# Patient Record
Sex: FEMALE | Race: WHITE | NOT HISPANIC OR LATINO
[De-identification: names, ages, dates, MRNs, and addresses within clinical notes are randomized per-mention and may not be internally consistent; named-entity substitution may affect disease eponyms.]

---

## 2017-10-12 PROBLEM — Z00.00 ENCOUNTER FOR PREVENTIVE HEALTH EXAMINATION: Noted: 2017-10-12

## 2017-10-13 ENCOUNTER — APPOINTMENT (OUTPATIENT)
Dept: OBGYN | Facility: CLINIC | Age: 62
End: 2017-10-13
Payer: COMMERCIAL

## 2017-10-13 VITALS
BODY MASS INDEX: 38.99 KG/M2 | DIASTOLIC BLOOD PRESSURE: 92 MMHG | SYSTOLIC BLOOD PRESSURE: 128 MMHG | WEIGHT: 234 LBS | HEIGHT: 65 IN

## 2017-10-13 DIAGNOSIS — R35.0 FREQUENCY OF MICTURITION: ICD-10-CM

## 2017-10-13 DIAGNOSIS — Z87.59 PERSONAL HISTORY OF OTHER COMPLICATIONS OF PREGNANCY, CHILDBIRTH AND THE PUERPERIUM: ICD-10-CM

## 2017-10-13 DIAGNOSIS — Z78.9 OTHER SPECIFIED HEALTH STATUS: ICD-10-CM

## 2017-10-13 DIAGNOSIS — Z01.419 ENCOUNTER FOR GYNECOLOGICAL EXAMINATION (GENERAL) (ROUTINE) W/OUT ABNORMAL FINDINGS: ICD-10-CM

## 2017-10-13 DIAGNOSIS — D25.9 LEIOMYOMA OF UTERUS, UNSPECIFIED: ICD-10-CM

## 2017-10-13 DIAGNOSIS — F17.200 NICOTINE DEPENDENCE, UNSPECIFIED, UNCOMPLICATED: ICD-10-CM

## 2017-10-13 PROCEDURE — 99386 PREV VISIT NEW AGE 40-64: CPT

## 2017-10-13 PROCEDURE — 99213 OFFICE O/P EST LOW 20 MIN: CPT | Mod: 25

## 2017-10-16 ENCOUNTER — ASOB RESULT (OUTPATIENT)
Age: 62
End: 2017-10-16

## 2017-10-16 ENCOUNTER — APPOINTMENT (OUTPATIENT)
Dept: OBGYN | Facility: CLINIC | Age: 62
End: 2017-10-16
Payer: COMMERCIAL

## 2017-10-16 ENCOUNTER — RX RENEWAL (OUTPATIENT)
Age: 62
End: 2017-10-16

## 2017-10-16 LAB
BACTERIA UR CULT: ABNORMAL
HPV HIGH+LOW RISK DNA PNL CVX: NOT DETECTED

## 2017-10-16 PROCEDURE — 76830 TRANSVAGINAL US NON-OB: CPT

## 2017-10-19 LAB — BACTERIA UR CULT: NORMAL

## 2017-10-20 LAB — CYTOLOGY CVX/VAG DOC THIN PREP: NORMAL

## 2017-10-25 ENCOUNTER — OTHER (OUTPATIENT)
Age: 62
End: 2017-10-25

## 2017-10-31 ENCOUNTER — OTHER (OUTPATIENT)
Age: 62
End: 2017-10-31

## 2017-12-05 ENCOUNTER — OTHER (OUTPATIENT)
Age: 62
End: 2017-12-05

## 2017-12-06 ENCOUNTER — LABORATORY RESULT (OUTPATIENT)
Age: 62
End: 2017-12-06

## 2017-12-06 ENCOUNTER — APPOINTMENT (OUTPATIENT)
Dept: OBGYN | Facility: CLINIC | Age: 62
End: 2017-12-06
Payer: COMMERCIAL

## 2017-12-06 DIAGNOSIS — R93.8 ABNORMAL FINDINGS ON DIAGNOSTIC IMAGING OF OTHER SPECIFIED BODY STRUCTURES: ICD-10-CM

## 2017-12-06 PROCEDURE — 58558Z: CUSTOM

## 2017-12-14 ENCOUNTER — OTHER (OUTPATIENT)
Age: 62
End: 2017-12-14

## 2018-01-25 ENCOUNTER — OUTPATIENT (OUTPATIENT)
Dept: OUTPATIENT SERVICES | Facility: HOSPITAL | Age: 63
LOS: 1 days | End: 2018-01-25
Payer: COMMERCIAL

## 2018-01-25 VITALS
TEMPERATURE: 98 F | DIASTOLIC BLOOD PRESSURE: 80 MMHG | WEIGHT: 235.89 LBS | SYSTOLIC BLOOD PRESSURE: 126 MMHG | HEART RATE: 85 BPM | HEIGHT: 63 IN | RESPIRATION RATE: 16 BRPM

## 2018-01-25 DIAGNOSIS — Z98.890 OTHER SPECIFIED POSTPROCEDURAL STATES: Chronic | ICD-10-CM

## 2018-01-25 DIAGNOSIS — N84.0 POLYP OF CORPUS UTERI: ICD-10-CM

## 2018-01-25 LAB
BLD GP AB SCN SERPL QL: NEGATIVE — SIGNIFICANT CHANGE UP
BUN SERPL-MCNC: 11 MG/DL — SIGNIFICANT CHANGE UP (ref 7–23)
CALCIUM SERPL-MCNC: 8.9 MG/DL — SIGNIFICANT CHANGE UP (ref 8.4–10.5)
CHLORIDE SERPL-SCNC: 99 MMOL/L — SIGNIFICANT CHANGE UP (ref 98–107)
CO2 SERPL-SCNC: 29 MMOL/L — SIGNIFICANT CHANGE UP (ref 22–31)
CREAT SERPL-MCNC: 0.63 MG/DL — SIGNIFICANT CHANGE UP (ref 0.5–1.3)
GLUCOSE SERPL-MCNC: 126 MG/DL — HIGH (ref 70–99)
HBA1C BLD-MCNC: 6.3 % — HIGH (ref 4–5.6)
HCT VFR BLD CALC: 43.2 % — SIGNIFICANT CHANGE UP (ref 34.5–45)
HGB BLD-MCNC: 13.8 G/DL — SIGNIFICANT CHANGE UP (ref 11.5–15.5)
MCHC RBC-ENTMCNC: 29.3 PG — SIGNIFICANT CHANGE UP (ref 27–34)
MCHC RBC-ENTMCNC: 31.9 % — LOW (ref 32–36)
MCV RBC AUTO: 91.7 FL — SIGNIFICANT CHANGE UP (ref 80–100)
NRBC # FLD: 0 — SIGNIFICANT CHANGE UP
PLATELET # BLD AUTO: 331 K/UL — SIGNIFICANT CHANGE UP (ref 150–400)
PMV BLD: 9.6 FL — SIGNIFICANT CHANGE UP (ref 7–13)
POTASSIUM SERPL-MCNC: 4.4 MMOL/L — SIGNIFICANT CHANGE UP (ref 3.5–5.3)
POTASSIUM SERPL-SCNC: 4.4 MMOL/L — SIGNIFICANT CHANGE UP (ref 3.5–5.3)
RBC # BLD: 4.71 M/UL — SIGNIFICANT CHANGE UP (ref 3.8–5.2)
RBC # FLD: 14 % — SIGNIFICANT CHANGE UP (ref 10.3–14.5)
RH IG SCN BLD-IMP: POSITIVE — SIGNIFICANT CHANGE UP
SODIUM SERPL-SCNC: 141 MMOL/L — SIGNIFICANT CHANGE UP (ref 135–145)
WBC # BLD: 7.59 K/UL — SIGNIFICANT CHANGE UP (ref 3.8–10.5)
WBC # FLD AUTO: 7.59 K/UL — SIGNIFICANT CHANGE UP (ref 3.8–10.5)

## 2018-01-25 PROCEDURE — 93010 ELECTROCARDIOGRAM REPORT: CPT

## 2018-01-25 RX ORDER — SODIUM CHLORIDE 9 MG/ML
1000 INJECTION, SOLUTION INTRAVENOUS
Qty: 0 | Refills: 0 | Status: DISCONTINUED | OUTPATIENT
Start: 2018-01-30 | End: 2018-01-31

## 2018-01-25 NOTE — H&P PST ADULT - PROBLEM SELECTOR PLAN 1
Pt is scheduled for dilation and curettage polypectomy with everett on 1/30/18.   Pre op instructions given and pt able to verbalize understanding.

## 2018-01-25 NOTE — H&P PST ADULT - NEGATIVE MUSCULOSKELETAL SYMPTOMS
no stiffness/no leg pain R/no muscle cramps/no muscle weakness/no neck pain/no arthralgia/no joint swelling/no myalgia/no arm pain L/no arm pain R/no back pain/no leg pain L

## 2018-01-25 NOTE — H&P PST ADULT - FAMILY HISTORY
Father  Still living? No  Family history of diabetes mellitus, Age at diagnosis: Age Unknown     Grandparent  Still living? No  Family history of diabetes mellitus, Age at diagnosis: Age Unknown     Mother  Still living? No  Family history of liver cancer, Age at diagnosis: Age Unknown

## 2018-01-25 NOTE — H&P PST ADULT - NEGATIVE NEUROLOGICAL SYMPTOMS
no loss of sensation/no difficulty walking/no transient paralysis/no weakness/no generalized seizures/no headache

## 2018-01-25 NOTE — H&P PST ADULT - PMH
Arthritis    High cholesterol    Obesity    Renal cyst    Uterine polyp Arthritis    High cholesterol    Obesity    Prediabetes    Renal cyst    Uterine polyp

## 2018-01-25 NOTE — H&P PST ADULT - NEGATIVE CARDIOVASCULAR SYMPTOMS
no dyspnea on exertion/no orthopnea/no chest pain/no palpitations/no claudication/no paroxysmal nocturnal dyspnea/no peripheral edema

## 2018-01-25 NOTE — H&P PST ADULT - MUSCULOSKELETAL
details… detailed exam no joint warmth/no calf tenderness/no joint erythema/ROM intact/no joint swelling/normal strength

## 2018-01-25 NOTE — H&P PST ADULT - NSANTHOSAYNRD_GEN_A_CORE
No. FANG screening performed.  STOP BANG Legend: 0-2 = LOW Risk; 3-4 = INTERMEDIATE Risk; 5-8 = HIGH Risk

## 2018-01-25 NOTE — H&P PST ADULT - HISTORY OF PRESENT ILLNESS
63 yo female with PMH hyperlipidemia presents to pre surgical testing.  Pt reports she started to experience urinary frequency 6 months ago, wet to PCP.  Pt reports CT done Aug 2017, revealed uterine polyp.  Pt is s/p D&C Dec 6, 2017.    Pt is scheduled for dilation and curettage polypectomy with symphion on 1/30/18. 61 yo female with PMH hyperlipidemia presents to pre surgical testing.  Pt reports she started to experience urinary frequency 6 months ago, went to PCP.  Pt reports CT done Aug 2017, revealed uterine polyp.  Pt is s/p D&C Dec 6, 2017.    Pt is scheduled for dilation and curettage polypectomy with symphion on 1/30/18.

## 2018-01-25 NOTE — H&P PST ADULT - PSH
Dental disorder  teeth removal 2010  History of D&C  12/6/17  S/P cholecystectomy  1988  S/P foot surgery  bunionectomy right foot 2013  left foot 2013

## 2018-01-30 ENCOUNTER — APPOINTMENT (OUTPATIENT)
Dept: OBGYN | Facility: HOSPITAL | Age: 63
End: 2018-01-30

## 2018-01-30 ENCOUNTER — RESULT REVIEW (OUTPATIENT)
Age: 63
End: 2018-01-30

## 2018-01-30 ENCOUNTER — OUTPATIENT (OUTPATIENT)
Dept: OUTPATIENT SERVICES | Facility: HOSPITAL | Age: 63
LOS: 1 days | Discharge: HOME | End: 2018-01-30
Payer: COMMERCIAL

## 2018-01-30 ENCOUNTER — TRANSCRIPTION ENCOUNTER (OUTPATIENT)
Age: 63
End: 2018-01-30

## 2018-01-30 VITALS
DIASTOLIC BLOOD PRESSURE: 64 MMHG | SYSTOLIC BLOOD PRESSURE: 110 MMHG | OXYGEN SATURATION: 96 % | HEART RATE: 85 BPM | RESPIRATION RATE: 16 BRPM | TEMPERATURE: 98 F

## 2018-01-30 VITALS
HEART RATE: 82 BPM | SYSTOLIC BLOOD PRESSURE: 141 MMHG | WEIGHT: 235.89 LBS | RESPIRATION RATE: 14 BRPM | TEMPERATURE: 98 F | HEIGHT: 63 IN | DIASTOLIC BLOOD PRESSURE: 81 MMHG | OXYGEN SATURATION: 96 %

## 2018-01-30 DIAGNOSIS — N84.0 POLYP OF CORPUS UTERI: ICD-10-CM

## 2018-01-30 DIAGNOSIS — Z98.890 OTHER SPECIFIED POSTPROCEDURAL STATES: Chronic | ICD-10-CM

## 2018-01-30 PROCEDURE — 88305 TISSUE EXAM BY PATHOLOGIST: CPT | Mod: 26

## 2018-01-30 PROCEDURE — 58562 HYSTEROSCOPY REMOVE FB: CPT

## 2018-01-30 RX ORDER — ATORVASTATIN CALCIUM 80 MG/1
1 TABLET, FILM COATED ORAL
Qty: 0 | Refills: 0 | COMMUNITY

## 2018-01-30 RX ORDER — CHOLECALCIFEROL (VITAMIN D3) 125 MCG
1 CAPSULE ORAL
Qty: 0 | Refills: 0 | COMMUNITY

## 2018-01-30 RX ORDER — SODIUM CHLORIDE 9 MG/ML
1000 INJECTION, SOLUTION INTRAVENOUS
Qty: 0 | Refills: 0 | Status: DISCONTINUED | OUTPATIENT
Start: 2018-01-30 | End: 2018-01-31

## 2018-01-30 RX ADMIN — SODIUM CHLORIDE 30 MILLILITER(S): 9 INJECTION, SOLUTION INTRAVENOUS at 11:05

## 2018-01-30 RX ADMIN — SODIUM CHLORIDE 125 MILLILITER(S): 9 INJECTION, SOLUTION INTRAVENOUS at 13:39

## 2018-01-30 NOTE — ASU DISCHARGE PLAN (ADULT/PEDIATRIC). - SPECIAL INSTRUCTIONS
You were given IV Tylenol for pain management.  Please DO NOT take tylenol for the next 6-8 hours (until ___5:45pm____ ). Please do not exceed 3000mg in 24hours.   You were given Toradol for pain management. Please DO Not take Motrin/Ibuprofen (NSAIDS) for the next 6 hours (Until ___6:15pm____).

## 2018-01-30 NOTE — ASU DISCHARGE PLAN (ADULT/PEDIATRIC). - NOTIFY
GYN Fever>100.4/Pain not relieved by Medications/Bleeding that does not stop/Unable to Urinate/Persistent Nausea and Vomiting

## 2018-01-30 NOTE — BRIEF OPERATIVE NOTE - PROCEDURE
<<-----Click on this checkbox to enter Procedure Hysteroscopic polypectomy of uterus with dilation and curettage  01/30/2018    Active  [place]

## 2018-02-13 ENCOUNTER — OTHER (OUTPATIENT)
Age: 63
End: 2018-02-13

## 2018-03-05 ENCOUNTER — APPOINTMENT (OUTPATIENT)
Dept: OBGYN | Facility: CLINIC | Age: 63
End: 2018-03-05
Payer: COMMERCIAL

## 2018-03-05 VITALS — DIASTOLIC BLOOD PRESSURE: 88 MMHG | WEIGHT: 240 LBS | BODY MASS INDEX: 39.94 KG/M2 | SYSTOLIC BLOOD PRESSURE: 141 MMHG

## 2018-03-05 PROCEDURE — 99213 OFFICE O/P EST LOW 20 MIN: CPT

## 2019-08-27 PROBLEM — N84.0 POLYP OF CORPUS UTERI: Chronic | Status: ACTIVE | Noted: 2018-01-25

## 2019-08-27 PROBLEM — N28.1 CYST OF KIDNEY, ACQUIRED: Chronic | Status: ACTIVE | Noted: 2018-01-25

## 2019-08-27 PROBLEM — E66.9 OBESITY, UNSPECIFIED: Chronic | Status: ACTIVE | Noted: 2018-01-25

## 2019-08-28 ENCOUNTER — APPOINTMENT (OUTPATIENT)
Dept: GASTROENTEROLOGY | Facility: CLINIC | Age: 64
End: 2019-08-28
Payer: COMMERCIAL

## 2019-08-28 VITALS
DIASTOLIC BLOOD PRESSURE: 82 MMHG | SYSTOLIC BLOOD PRESSURE: 128 MMHG | WEIGHT: 254 LBS | BODY MASS INDEX: 43.36 KG/M2 | HEIGHT: 64 IN

## 2019-08-28 DIAGNOSIS — Z80.0 FAMILY HISTORY OF MALIGNANT NEOPLASM OF DIGESTIVE ORGANS: ICD-10-CM

## 2019-08-28 DIAGNOSIS — Z12.12 ENCOUNTER FOR SCREENING FOR MALIGNANT NEOPLASM OF COLON: ICD-10-CM

## 2019-08-28 DIAGNOSIS — Z86.39 PERSONAL HISTORY OF OTHER ENDOCRINE, NUTRITIONAL AND METABOLIC DISEASE: ICD-10-CM

## 2019-08-28 DIAGNOSIS — Z12.11 ENCOUNTER FOR SCREENING FOR MALIGNANT NEOPLASM OF COLON: ICD-10-CM

## 2019-08-28 DIAGNOSIS — R19.5 OTHER FECAL ABNORMALITIES: ICD-10-CM

## 2019-08-28 DIAGNOSIS — Z87.09 PERSONAL HISTORY OF OTHER DISEASES OF THE RESPIRATORY SYSTEM: ICD-10-CM

## 2019-08-28 PROCEDURE — 99214 OFFICE O/P EST MOD 30 MIN: CPT

## 2019-08-28 NOTE — PHYSICAL EXAM
[General Appearance - Alert] : alert [General Appearance - In No Acute Distress] : in no acute distress [Sclera] : the sclera and conjunctiva were normal [Extraocular Movements] : extraocular movements were intact [Outer Ear] : the ears and nose were normal in appearance [Hearing Threshold Finger Rub Not [place]] : hearing was normal [Neck Appearance] : the appearance of the neck was normal [Neck Cervical Mass (___cm)] : no neck mass was observed [Auscultation Breath Sounds / Voice Sounds] : lungs were clear to auscultation bilaterally [Heart Rate And Rhythm] : heart rate was normal and rhythm regular [Heart Sounds] : normal S1 and S2 [Heart Sounds Gallop] : no gallops [Murmurs] : no murmurs [Heart Sounds Pericardial Friction Rub] : no pericardial rub [Abdomen Soft] : soft [Bowel Sounds] : normal bowel sounds [Abdomen Tenderness] : non-tender [Abdomen Mass (___ Cm)] : no abdominal mass palpated [Nail Clubbing] : no clubbing  or cyanosis of the fingernails [Abnormal Walk] : normal gait [Skin Color & Pigmentation] : normal skin color and pigmentation [] : no rash [Affect] : the affect was normal [Impaired Insight] : insight and judgment were intact [Oriented To Time, Place, And Person] : oriented to person, place, and time

## 2019-08-29 PROBLEM — Z80.0 FAMILY HISTORY OF LIVER CANCER: Status: ACTIVE | Noted: 2017-10-13

## 2019-08-29 NOTE — ASSESSMENT
[FreeTextEntry1] : 64 y/o woman with Hx of DM, hyperlipidemia, COPD, active smoker who is referred for positive FIT.  \par \par I will therefore plan for a colonoscopy to rule out colon polyps, colorectal cancer etc. under monitored anesthesia care.  Discussed alternatives such as CT colonography she would like to hold off.   She would like a colonoscopy. Risks such as perforation requiring surgery, bleeding, infection, colitis, missed lesion, internal organ injury, etc, risks of bowel prep including colitis, syncope etc. and risks of anesthesia including cardiopulmonary compromise were discussed with patient.  Patient verbalized understanding and agrees to proceed with the planned procedure.  She will need cardiology clearance first.  \par \par Discussed EGD to r/o upper GI tract pathology - she would like to hold off. \par   \par Above all discussed with patient daughter Merlyn who is a nurse. \par \par \par

## 2019-08-29 NOTE — HISTORY OF PRESENT ILLNESS
[de-identified] : 62 y/o woman with Hx of DM, hyperlipidemia, COPD, active smoker who is referred for positive FIT.  \par \par \par Recent blood test in 8/2019 showed H/H of 14.6/45.8.  MCV 91.1. \par \par Has occasional slight discomfort in rectum.   2 to 3 times a day goes to the bathroom for years.  She has to push at times to have a bowel movement.    Lost 11 pounds recently intensionally. \par \par Pt denies having abdominal pain, heartburn, dysphagia, odynophagia, nausea, vomiting, fevers, chills, jaundice, loss of appetite, diarrhea, hematochezia and melena.\par \par Mom had liver cancer.  Patient denies family Hx of other GI cancers.\par \par She gets SOB on exertion.   All other review of systems are negative.

## 2019-10-07 ENCOUNTER — OUTPATIENT (OUTPATIENT)
Dept: OUTPATIENT SERVICES | Facility: HOSPITAL | Age: 64
LOS: 1 days | End: 2019-10-07
Payer: COMMERCIAL

## 2019-10-07 VITALS
SYSTOLIC BLOOD PRESSURE: 120 MMHG | RESPIRATION RATE: 14 BRPM | HEART RATE: 92 BPM | HEIGHT: 65 IN | TEMPERATURE: 99 F | WEIGHT: 244.93 LBS | DIASTOLIC BLOOD PRESSURE: 69 MMHG | OXYGEN SATURATION: 95 %

## 2019-10-07 DIAGNOSIS — R94.39 ABNORMAL RESULT OF OTHER CARDIOVASCULAR FUNCTION STUDY: ICD-10-CM

## 2019-10-07 DIAGNOSIS — Z98.890 OTHER SPECIFIED POSTPROCEDURAL STATES: Chronic | ICD-10-CM

## 2019-10-07 LAB
ALBUMIN SERPL ELPH-MCNC: 4.1 G/DL — SIGNIFICANT CHANGE UP (ref 3.3–5)
ALP SERPL-CCNC: 61 U/L — SIGNIFICANT CHANGE UP (ref 40–120)
ALT FLD-CCNC: 18 U/L — SIGNIFICANT CHANGE UP (ref 10–45)
ANION GAP SERPL CALC-SCNC: 11 MMOL/L — SIGNIFICANT CHANGE UP (ref 5–17)
AST SERPL-CCNC: 19 U/L — SIGNIFICANT CHANGE UP (ref 10–40)
BILIRUB SERPL-MCNC: 0.3 MG/DL — SIGNIFICANT CHANGE UP (ref 0.2–1.2)
BUN SERPL-MCNC: 12 MG/DL — SIGNIFICANT CHANGE UP (ref 7–23)
CALCIUM SERPL-MCNC: 9.1 MG/DL — SIGNIFICANT CHANGE UP (ref 8.4–10.5)
CHLORIDE SERPL-SCNC: 102 MMOL/L — SIGNIFICANT CHANGE UP (ref 96–108)
CO2 SERPL-SCNC: 26 MMOL/L — SIGNIFICANT CHANGE UP (ref 22–31)
CREAT SERPL-MCNC: 0.48 MG/DL — LOW (ref 0.5–1.3)
GLUCOSE BLDC GLUCOMTR-MCNC: 129 MG/DL — HIGH (ref 70–99)
GLUCOSE SERPL-MCNC: 141 MG/DL — HIGH (ref 70–99)
HCT VFR BLD CALC: 42.1 % — SIGNIFICANT CHANGE UP (ref 34.5–45)
HGB BLD-MCNC: 14 G/DL — SIGNIFICANT CHANGE UP (ref 11.5–15.5)
MCHC RBC-ENTMCNC: 28.9 PG — SIGNIFICANT CHANGE UP (ref 27–34)
MCHC RBC-ENTMCNC: 33.3 GM/DL — SIGNIFICANT CHANGE UP (ref 32–36)
MCV RBC AUTO: 87 FL — SIGNIFICANT CHANGE UP (ref 80–100)
NRBC # BLD: 0 /100 WBCS — SIGNIFICANT CHANGE UP (ref 0–0)
PLATELET # BLD AUTO: 312 K/UL — SIGNIFICANT CHANGE UP (ref 150–400)
POTASSIUM SERPL-MCNC: 4.5 MMOL/L — SIGNIFICANT CHANGE UP (ref 3.5–5.3)
POTASSIUM SERPL-SCNC: 4.5 MMOL/L — SIGNIFICANT CHANGE UP (ref 3.5–5.3)
PROT SERPL-MCNC: 6.7 G/DL — SIGNIFICANT CHANGE UP (ref 6–8.3)
RBC # BLD: 4.84 M/UL — SIGNIFICANT CHANGE UP (ref 3.8–5.2)
RBC # FLD: 14.9 % — HIGH (ref 10.3–14.5)
SODIUM SERPL-SCNC: 139 MMOL/L — SIGNIFICANT CHANGE UP (ref 135–145)
WBC # BLD: 6.43 K/UL — SIGNIFICANT CHANGE UP (ref 3.8–10.5)
WBC # FLD AUTO: 6.43 K/UL — SIGNIFICANT CHANGE UP (ref 3.8–10.5)

## 2019-10-07 PROCEDURE — 85027 COMPLETE CBC AUTOMATED: CPT

## 2019-10-07 PROCEDURE — 93460 R&L HRT ART/VENTRICLE ANGIO: CPT | Mod: 26

## 2019-10-07 PROCEDURE — C1887: CPT

## 2019-10-07 PROCEDURE — C1894: CPT

## 2019-10-07 PROCEDURE — 93010 ELECTROCARDIOGRAM REPORT: CPT

## 2019-10-07 PROCEDURE — 82962 GLUCOSE BLOOD TEST: CPT

## 2019-10-07 PROCEDURE — 80053 COMPREHEN METABOLIC PANEL: CPT

## 2019-10-07 PROCEDURE — 93005 ELECTROCARDIOGRAM TRACING: CPT

## 2019-10-07 PROCEDURE — C1889: CPT

## 2019-10-07 PROCEDURE — C1769: CPT

## 2019-10-07 PROCEDURE — 93460 R&L HRT ART/VENTRICLE ANGIO: CPT

## 2019-10-07 RX ORDER — SODIUM CHLORIDE 9 MG/ML
3 INJECTION INTRAMUSCULAR; INTRAVENOUS; SUBCUTANEOUS EVERY 8 HOURS
Refills: 0 | Status: DISCONTINUED | OUTPATIENT
Start: 2019-10-07 | End: 2019-10-22

## 2019-10-07 NOTE — H&P CARDIOLOGY - HISTORY OF PRESENT ILLNESS
64 yo  female (denies any implantable devices) with PMH DMT2 (a1c 9 Sept 2019) and HLD presents today for cardiac angiogram. Patient reports exertional dyspnea x 3 months. Denies lightheadedness palpitations, syncope, PND or orthopnea.  Patient seen and evaluated by Dr. Morales- TTE done revealing dilated CM, moderate AR, EF 40-45%. Also holter monitor given to patient  shows- asymptomatic PAC's, occasional PVC's 62 yo  female (denies any implantable devices) with PMH DMT2 (a1c 9 Sept 2019) and HLD presents today for cardiac angiogram. Patient reports exertional dyspnea x 3 months. Denies lightheadedness palpitations, syncope, PND or orthopnea.  Patient seen and evaluated by Dr. Morales- TTE done revealing dilated CM, moderate AR and aortic aneurysm (5.6cm) with EF 40-45%. Also holter monitor given to patient  shows- asymptomatic PAC's, occasional PVC's 64 yo  female (denies any implantable devices) with PMH DMT2 (a1c 9 Sept 2019) and HLD presents today for cardiac angiogram. Patient reports clearance for colonoscopy. Denies lightheadedness palpitations, syncope, PND or orthopnea.  Patient seen and evaluated by Dr. Morales- TTE done revealing dilated CM, moderate AR and aortic aneurysm (5.6cm) with EF 40-45%. Also holter monitor given to patient  shows- asymptomatic PAC's, occasional PVC's

## 2019-10-07 NOTE — H&P CARDIOLOGY - PMH
Arthritis    Former smoker    High cholesterol    Obesity    Prediabetes    Renal cyst    Uterine polyp

## 2019-10-07 NOTE — H&P CARDIOLOGY - RESPIRATORY
Airway patent, TM normal bilaterally, normal appearing mouth, nose, throat, neck supple with full range of motion, no cervical adenopathy. Breath Sounds equal & clear to percussion & auscultation, no accessory muscle use

## 2019-10-28 ENCOUNTER — APPOINTMENT (OUTPATIENT)
Dept: CARDIOTHORACIC SURGERY | Facility: CLINIC | Age: 64
End: 2019-10-28
Payer: COMMERCIAL

## 2019-10-28 VITALS
TEMPERATURE: 97.8 F | HEIGHT: 64 IN | DIASTOLIC BLOOD PRESSURE: 78 MMHG | OXYGEN SATURATION: 98 % | RESPIRATION RATE: 14 BRPM | BODY MASS INDEX: 39.27 KG/M2 | WEIGHT: 230 LBS | SYSTOLIC BLOOD PRESSURE: 118 MMHG | HEART RATE: 86 BPM

## 2019-10-28 DIAGNOSIS — E78.5 HYPERLIPIDEMIA, UNSPECIFIED: ICD-10-CM

## 2019-10-28 DIAGNOSIS — I71.9 AORTIC ANEURYSM OF UNSPECIFIED SITE, W/OUT RUPTURE: ICD-10-CM

## 2019-10-28 DIAGNOSIS — Z83.3 FAMILY HISTORY OF DIABETES MELLITUS: ICD-10-CM

## 2019-10-28 DIAGNOSIS — E11.9 TYPE 2 DIABETES MELLITUS W/OUT COMPLICATIONS: ICD-10-CM

## 2019-10-28 PROCEDURE — 99204 OFFICE O/P NEW MOD 45 MIN: CPT

## 2019-11-07 ENCOUNTER — OUTPATIENT (OUTPATIENT)
Dept: OUTPATIENT SERVICES | Facility: HOSPITAL | Age: 64
LOS: 1 days | End: 2019-11-07
Payer: COMMERCIAL

## 2019-11-07 ENCOUNTER — APPOINTMENT (OUTPATIENT)
Dept: ULTRASOUND IMAGING | Facility: HOSPITAL | Age: 64
End: 2019-11-07

## 2019-11-07 ENCOUNTER — APPOINTMENT (OUTPATIENT)
Dept: CARDIOTHORACIC SURGERY | Facility: CLINIC | Age: 64
End: 2019-11-07

## 2019-11-07 VITALS
DIASTOLIC BLOOD PRESSURE: 77 MMHG | TEMPERATURE: 98 F | RESPIRATION RATE: 16 BRPM | HEART RATE: 88 BPM | HEIGHT: 63 IN | OXYGEN SATURATION: 97 % | SYSTOLIC BLOOD PRESSURE: 115 MMHG | WEIGHT: 227.96 LBS

## 2019-11-07 DIAGNOSIS — I71.9 AORTIC ANEURYSM OF UNSPECIFIED SITE, WITHOUT RUPTURE: ICD-10-CM

## 2019-11-07 DIAGNOSIS — Z29.9 ENCOUNTER FOR PROPHYLACTIC MEASURES, UNSPECIFIED: ICD-10-CM

## 2019-11-07 DIAGNOSIS — Z98.890 OTHER SPECIFIED POSTPROCEDURAL STATES: Chronic | ICD-10-CM

## 2019-11-07 DIAGNOSIS — Z01.818 ENCOUNTER FOR OTHER PREPROCEDURAL EXAMINATION: ICD-10-CM

## 2019-11-07 DIAGNOSIS — E11.9 TYPE 2 DIABETES MELLITUS WITHOUT COMPLICATIONS: ICD-10-CM

## 2019-11-07 DIAGNOSIS — I35.0 NONRHEUMATIC AORTIC (VALVE) STENOSIS: ICD-10-CM

## 2019-11-07 DIAGNOSIS — I10 ESSENTIAL (PRIMARY) HYPERTENSION: ICD-10-CM

## 2019-11-07 LAB
ANION GAP SERPL CALC-SCNC: 11 MMOL/L — SIGNIFICANT CHANGE UP (ref 5–17)
BLD GP AB SCN SERPL QL: NEGATIVE — SIGNIFICANT CHANGE UP
BUN SERPL-MCNC: 11 MG/DL — SIGNIFICANT CHANGE UP (ref 7–23)
CALCIUM SERPL-MCNC: 9.5 MG/DL — SIGNIFICANT CHANGE UP (ref 8.4–10.5)
CHLORIDE SERPL-SCNC: 100 MMOL/L — SIGNIFICANT CHANGE UP (ref 96–108)
CO2 SERPL-SCNC: 25 MMOL/L — SIGNIFICANT CHANGE UP (ref 22–31)
CREAT SERPL-MCNC: 0.45 MG/DL — LOW (ref 0.5–1.3)
GLUCOSE SERPL-MCNC: 116 MG/DL — HIGH (ref 70–99)
HBA1C BLD-MCNC: 6.5 % — HIGH (ref 4–5.6)
HCT VFR BLD CALC: 41.8 % — SIGNIFICANT CHANGE UP (ref 34.5–45)
HGB BLD-MCNC: 13.4 G/DL — SIGNIFICANT CHANGE UP (ref 11.5–15.5)
MCHC RBC-ENTMCNC: 28.8 PG — SIGNIFICANT CHANGE UP (ref 27–34)
MCHC RBC-ENTMCNC: 32.1 GM/DL — SIGNIFICANT CHANGE UP (ref 32–36)
MCV RBC AUTO: 89.9 FL — SIGNIFICANT CHANGE UP (ref 80–100)
MRSA PCR RESULT.: SIGNIFICANT CHANGE UP
PLATELET # BLD AUTO: 317 K/UL — SIGNIFICANT CHANGE UP (ref 150–400)
POTASSIUM SERPL-MCNC: 4.3 MMOL/L — SIGNIFICANT CHANGE UP (ref 3.5–5.3)
POTASSIUM SERPL-SCNC: 4.3 MMOL/L — SIGNIFICANT CHANGE UP (ref 3.5–5.3)
RBC # BLD: 4.65 M/UL — SIGNIFICANT CHANGE UP (ref 3.8–5.2)
RBC # FLD: 16 % — HIGH (ref 10.3–14.5)
RH IG SCN BLD-IMP: POSITIVE — SIGNIFICANT CHANGE UP
S AUREUS DNA NOSE QL NAA+PROBE: SIGNIFICANT CHANGE UP
SODIUM SERPL-SCNC: 136 MMOL/L — SIGNIFICANT CHANGE UP (ref 135–145)
WBC # BLD: 6.47 K/UL — SIGNIFICANT CHANGE UP (ref 3.8–10.5)
WBC # FLD AUTO: 6.47 K/UL — SIGNIFICANT CHANGE UP (ref 3.8–10.5)

## 2019-11-07 PROCEDURE — 86900 BLOOD TYPING SEROLOGIC ABO: CPT

## 2019-11-07 PROCEDURE — 93880 EXTRACRANIAL BILAT STUDY: CPT | Mod: 26

## 2019-11-07 PROCEDURE — 86901 BLOOD TYPING SEROLOGIC RH(D): CPT

## 2019-11-07 PROCEDURE — 71046 X-RAY EXAM CHEST 2 VIEWS: CPT

## 2019-11-07 PROCEDURE — 87640 STAPH A DNA AMP PROBE: CPT

## 2019-11-07 PROCEDURE — 93880 EXTRACRANIAL BILAT STUDY: CPT

## 2019-11-07 PROCEDURE — 87641 MR-STAPH DNA AMP PROBE: CPT

## 2019-11-07 PROCEDURE — 85027 COMPLETE CBC AUTOMATED: CPT

## 2019-11-07 PROCEDURE — 83036 HEMOGLOBIN GLYCOSYLATED A1C: CPT

## 2019-11-07 PROCEDURE — 80048 BASIC METABOLIC PNL TOTAL CA: CPT

## 2019-11-07 PROCEDURE — 86850 RBC ANTIBODY SCREEN: CPT

## 2019-11-07 PROCEDURE — G0463: CPT

## 2019-11-07 PROCEDURE — 71046 X-RAY EXAM CHEST 2 VIEWS: CPT | Mod: 26

## 2019-11-07 RX ORDER — ATORVASTATIN CALCIUM 80 MG/1
1 TABLET, FILM COATED ORAL
Qty: 0 | Refills: 0 | DISCHARGE

## 2019-11-07 RX ORDER — MONTELUKAST 4 MG/1
1 TABLET, CHEWABLE ORAL
Qty: 0 | Refills: 0 | DISCHARGE

## 2019-11-07 RX ORDER — CEFUROXIME AXETIL 250 MG
1500 TABLET ORAL ONCE
Refills: 0 | Status: COMPLETED | OUTPATIENT
Start: 2019-11-12 | End: 2019-11-12

## 2019-11-07 NOTE — H&P PST ADULT - SOURCE OF INFORMATION, PROFILE
patient family/patient/pt.'s , Jem,  present during PST visit. Pt.'s primary language is English, understands English well, declined telephonic

## 2019-11-07 NOTE — H&P PST ADULT - ASSESSMENT
CAPRINI SCORE [CLOT]    AGE RELATED RISK FACTORS                                                       MOBILITY RELATED FACTORS  [ ] Age 41-60 years                                            (1 Point)                  [ ] Bed rest                                                        (1 Point)  [x ] Age: 61-74 years                                           (2 Points)                 [ ] Plaster cast                                                   (2 Points)  [ ] Age= 75 years                                              (3 Points)                 [ ] Bed bound for more than 72 hours                 (2 Points)    DISEASE RELATED RISK FACTORS                                               GENDER SPECIFIC FACTORS  [ ] Edema in the lower extremities                       (1 Point)                  [ ] Pregnancy                                                     (1 Point)  [x ] Varicose veins                                               (1 Point)                  [ ] Post-partum < 6 weeks                                   (1 Point)             [x ] BMI > 25 Kg/m2                                            (1 Point)                  [ ] Hormonal therapy  or oral contraception          (1 Point)                 [ ] Sepsis (in the previous month)                        (1 Point)                  [ ] History of pregnancy complications                 (1 point)  [ ] Pneumonia or serious lung disease                                               [ ] Unexplained or recurrent                     (1 Point)           (in the previous month)                               (1 Point)  [ ] Abnormal pulmonary function test                     (1 Point)                 SURGERY RELATED RISK FACTORS  [ ] Acute myocardial infarction                              (1 Point)                 [ ]  Section                                             (1 Point)  [ ] Congestive heart failure (in the previous month)  (1 Point)               [ ] Minor surgery                                                  (1 Point)   [ ] Inflammatory bowel disease                             (1 Point)                 [ ] Arthroscopic surgery                                        (2 Points)  [ ] Central venous access                                      (2 Points)                x] General surgery lasting more than 45 minutes   (2 Points)       [ ] Stroke (in the previous month)                          (5 Points)               [ ] Elective arthroplasty                                         (5 Points)                                                                                                                                               HEMATOLOGY RELATED FACTORS                                                 TRAUMA RELATED RISK FACTORS  [ ] Prior episodes of VTE                                     (3 Points)                [ ] Fracture of the hip, pelvis, or leg                       (5 Points)  [ ] Positive family history for VTE                         (3 Points)                 [ ] Acute spinal cord injury (in the previous month)  (5 Points)  [ ] Prothrombin 29339 A                                     (3 Points)                 [ ] Paralysis  (less than 1 month)                             (5 Points)  [ ] Factor V Leiden                                             (3 Points)                  [ ] Multiple Trauma within 1 month                        (5 Points)  [ ] Lupus anticoagulants                                     (3 Points)                                                           [ ] Anticardiolipin antibodies                               (3 Points)                                                       [ ] High homocysteine in the blood                      (3 Points)                                             [ ] Other congenital or acquired thrombophilia      (3 Points)                                                [ ] Heparin induced thrombocytopenia                  (3 Points)                                          Total Score [       6   ]    Caprini Score 0 - 2:  Low Risk, No VTE Prophylaxis required for most patients, encourage ambulation  Caprini Score 3 - 6:  At Risk, pharmacologic VTE prophylaxis is indicated for most patients (in the absence of a contraindication)  Caprini Score Greater than or = 7:  High Risk, pharmacologic VTE prophylaxis is indicated for most patients (in the absence of a contraindication)

## 2019-11-07 NOTE — H&P PST ADULT - ACTIVITY
ADLs, house chores, climbs 1 flight of stairs daily Can walk 3 blocks, two flights of stairs without stopping

## 2019-11-07 NOTE — H&P PST ADULT - NSICDXPASTMEDICALHX_GEN_ALL_CORE_FT
PAST MEDICAL HISTORY:  Arthritis     Former smoker     High cholesterol     Obesity     Prediabetes     Renal cyst     Uterine polyp PAST MEDICAL HISTORY:  Arthritis     COPD, mild Singulair at night, no nebulizers    DM2 (diabetes mellitus, type 2)     Former smoker Quit 10/2019    H/O aortic aneurysm Dx 10/2019    High cholesterol     Hypertension     Lung nodule Dx 2016, left 3mm    Obesity     Renal cyst     Uterine polyp

## 2019-11-07 NOTE — H&P PST ADULT - LYMPHATIC
supraclavicular L/anterior cervical R/posterior cervical L/anterior cervical L/posterior cervical R/supraclavicular R anterior cervical R/anterior cervical L

## 2019-11-07 NOTE — H&P PST ADULT - HISTORY OF PRESENT ILLNESS
62 yo female, PMH HTN, HLD, COPD, DM2, + Cologuard test, going through cardiac clearance for colonoscopy, Echocardiogram revealed an aortic root 5.8 cm ascending aorta 5.8 cm. Pt. referred to CT surgery and presents to PST for scheduled Aortic Root Replacement with Ascending Aorta Replacement and Aortic Valve Repair on 11/12/19. Pt. rts occasional swelling of her lower extremities, she has been feeling well since actively trying to lose weight and has lost 30 lbs since August Denies recent fever, chills, chest pain, SOB, changes in bowel/urinary habits.    3mm scar left lung 3 years ago, one year later 64 yo female, PMH HTN, HLD, COPD, DM2, + Cologuard test, going through cardiac clearance for colonoscopy, Echocardiogram revealed an aortic root 5.6 cm ascending aorta 5.8 cm. Pt. referred to CT surgery and presents to PST for scheduled Aortic Root Replacement with Ascending Aorta Replacement/Aortic Valve Repair on 11/12/19. Pt. reports feeling well since actively trying to lose weight and has lost 30 lbs since August, occasional PIEDRA and swelling of LE. Denies back pain, chest pain, palpitations recent fever, chills, changes in bowel/urinary habits.

## 2019-11-07 NOTE — H&P PST ADULT - NSICDXPROBLEM_GEN_ALL_CORE_FT
PROBLEM DIAGNOSES  Problem: AA (aortic aneurysm)  Assessment and Plan: Aortic Root Replacement w Ascending Aorta Replacement/Aortic Valve Repair  Pre-op instructions, including Chlorhexidine soap, provided - all questions answered    Problem: Hypertension  Assessment and Plan: Continue Metoprolol as indicated, including am of surgery with sip of water    Problem: DM2 (diabetes mellitus, type 2)  Assessment and Plan: FS DOS    Problem: Prophylactic measure  Assessment and Plan: The Caprini score indicates that this patient is at high risk for a VTE event (score 6 or greater). Surgical patients in this group will benefit from both pharmacologic prophylaxis and intermittent compression devices.  The surgical team will determine the balance between VTE risk and bleeding risk, and other clinical considerations

## 2019-11-07 NOTE — H&P PST ADULT - NEGATIVE GENERAL GENITOURINARY SYMPTOMS
no dysuria/no bladder infections/no hematuria/no flank pain L/no flank pain R no hematuria/no incontinence/no bladder infections/no dysuria/no flank pain R/no flank pain L/normal urinary frequency

## 2019-11-07 NOTE — H&P PST ADULT - PRIMARY CARE PROVIDER
Dr. Laguna([facility]) [phone] Dr. Laguna ([facility]) [phone]

## 2019-11-07 NOTE — H&P PST ADULT - NEGATIVE MUSCULOSKELETAL SYMPTOMS
no myalgia/no stiffness/no neck pain/no arm pain R/no leg pain R/no arm pain L/no joint swelling/no muscle weakness/no back pain/no leg pain L/no muscle cramps no joint swelling/no back pain/no stiffness/no neck pain

## 2019-11-07 NOTE — H&P PST ADULT - GASTROINTESTINAL DETAILS
no distention/soft/bowel sounds normal/nontender no masses palpable/bowel sounds normal/soft/no distention/no bruit/nontender

## 2019-11-07 NOTE — H&P PST ADULT - NSICDXPASTSURGICALHX_GEN_ALL_CORE_FT
PAST SURGICAL HISTORY:  Dental disorder teeth removal 2010    History of D&C 12/6/17 and 2018 for thickened endometrium    S/P cholecystectomy 1988    S/P foot surgery bunionectomy right foot 2013  left foot 2013

## 2019-11-07 NOTE — H&P PST ADULT - NEGATIVE NEUROLOGICAL SYMPTOMS
no difficulty walking/no headache/no loss of sensation/no transient paralysis/no generalized seizures/no weakness no weakness/no syncope/no headache/no loss of sensation/no difficulty walking/no generalized seizures

## 2019-11-07 NOTE — H&P PST ADULT - MUSCULOSKELETAL
details… detailed exam no joint warmth/no joint erythema/ROM intact/normal strength/no joint swelling/no calf tenderness

## 2019-11-07 NOTE — H&P PST ADULT - NEGATIVE ENMT SYMPTOMS
no vertigo/no nasal congestion/no ear pain/no tinnitus/no dysphagia/no sinus symptoms/no hearing difficulty no hearing difficulty/no nasal congestion/no dysphagia/no ear pain/no tinnitus/no throat pain/no sinus symptoms/no vertigo

## 2019-11-07 NOTE — H&P PST ADULT - NSICDXFAMILYHX_GEN_ALL_CORE_FT
FAMILY HISTORY:  Father  Still living? No  Family history of diabetes mellitus, Age at diagnosis: Age Unknown    Mother  Still living? No  Family history of liver cancer, Age at diagnosis: Age Unknown    Grandparent  Still living? No  Family history of diabetes mellitus, Age at diagnosis: Age Unknown

## 2019-11-11 ENCOUNTER — TRANSCRIPTION ENCOUNTER (OUTPATIENT)
Age: 64
End: 2019-11-11

## 2019-11-11 NOTE — HISTORY OF PRESENT ILLNESS
[FreeTextEntry1] : Virgie is a 63 year old female with history of HLD, COPD, active smoker and recently diagnosed with DMII, presents today with her  and daughter, for an evaluation of an aortic aneurysm, referred by cardiologist Dr. Roel Cabello.  She was in the process of obtaining cardiology clearance for a colonoscopy after a positive fit test in August when she say Dr. Cabello for an echo. She reports she feels good otherwise. She reports occasional swelling to her lower legs when on her feet for a while and some occasional lower back pain just at the belt line that has been present for months. Worsened by movement and standing from a sitting position and subsides with rest.  She also reports recent weight loss of 30 lbs since August but has been actively trying to loose weight through dieting since then. She denies fever, chills, syncope, chest pain, orthopnea, SOB, palpitations, dizziness, abdominal pain, back pain or vomiting.  \par \par Echocardiogram 9/26/19: aortic root 5.6cm, EF40-45%.\par \par CTA chest 10/17/19: aortic root 4.2cm, ascending aorta 5.8cm, descending aorta tortuous and dilated. \par \par Cardiac cath 10/7/19 revealed: normal coronaries. \par

## 2019-11-11 NOTE — END OF VISIT
[FreeTextEntry3] : Written by Dima Salter NP, acting as a scribe for Dr. Morin.\par “The documentation recorded by the scribe accurately reflects the service I personally performed and the decisions made by me.” Signature Solomon Morin MD.\par \par

## 2019-11-11 NOTE — ASSESSMENT
[FreeTextEntry1] : Virgie is a 63 year old female with history of HLD, COPD, active smoker and recently diagnosed with DMII, presents today with her  and daughter, for an evaluation of an aortic aneurysm, referred by cardiologist Dr. Roel Cabello.  She was in the process of obtaining cardiology clearance for a colonoscopy after a positive fit test in August when she say Dr. Cabello for an echo. She reports she feels good otherwise. She reports occasional swelling to her lower legs when on her feet for a while and some occasional lower back pain just at the belt line that has been present for months. Worsened by movement and standing from a sitting position and subsides with rest.  She also reports recent weight loss of 30 lbs since August but has been actively trying to loose weight through dieting since then. She denies fever, chills, syncope, chest pain, orthopnea, SOB, palpitations, dizziness, abdominal pain, back pain or vomiting.  \par \par Echocardiogram 9/26/19: aortic root 5.6cm, EF40-45%.\par \par CTA chest 10/17/19: aortic root 4.2cm, ascending aorta 5.8cm, descending aorta tortuous and dilated. \par \par Cardiac cath 10/7/19 revealed: normal coronaries. \par \par The patient's medical records and diagnostic images were reviewed at the time of this office visit, and the following recommendation was made. \par \par I reviewed the cardiac imaging, medical records and reports with patient and discussed the case.  I discussed the risks , benefits and alternatives to surgery. Risks included but not limited to  bleeding, stroke, Myocardial Infarction, kidney problems,Blood transfusion ,permanent  pacemaker implantation, infections and death. I  quoted a 1-2% operative mortality and complication risks. I also discussed the various approaches in detail. I  feel that the patient will benefit and is a candidate for a aortic root replacement with ascending aorta/AV repair/replacement.\par All questions and concerns were addressed and patient agrees to proceed with surgery. \par \par Plan:\par 1) Surgery date to be determined\par 2) Carotid Duplex pre op\par 3) PFT’s pre op\par 4) PST pre op\par \par

## 2019-11-11 NOTE — CONSULT LETTER
[FreeTextEntry2] : Dr. Roel Morales\par [facility] \par [address] [facility] [address]\[place] [address] [FreeTextEntry3] : \par Solomon Morin M.D.\par Professor of Cardiovascular and Thoracic Surgery\par Minimally Invasive Valve Surgeon\par Director of Aortic Surgery, [facility]\par Cell: [phone]\par Email: [email or web address] \par \par [facility]:\par [address], [address] Floor, [place] [address]\par Office: [phone]\par Fax: [phone]\par \par [facility]:\par Department of Cardiovascular and Thoracic Surgery\par [address] [place] [address], [address]\par Office: [phone]\par Fax: [phone]\par \par Practice Manager: Ms. Zenia Marie\par Email: [email or web address]\par Phone: [phone]\par \par   [FreeTextEntry1] : \par I had the pleasure of seeing your patient, OSWALD KULKARNI in my office today. \par \par We take a multidisciplinary team approach to patient care and consider you, the physician, an extension of our team. We will maintain an open line of communication with you throughout your patient's treatment course. \par \par She is being evaluated for aortic aneurysm. I have reviewed all of the patient's medical records and diagnostic images at the time of her office consultation. I have enclosed a copy for your records. \par \par I have reviewed the indications for surgery,and used our webpage [email or web address] <[email or web address]> to illustrate the aorta and anatomy of the heart. The patient meets criteria for surgery. I have recommended that the patient is a candidate for aortic root replacement with ascending aorta/AV repair/replacement.\par \par I will update you on her perioperative status and disposition upon discharge. \par \par I appreciate the opportunity to care for your patient at the [facility] for [facility] based at [facility]. If there are any questions or concerns, please call me directly at [phone]. \par

## 2019-11-11 NOTE — PHYSICAL EXAM
[Examination Of The Chest] : the chest was normal in appearance [Right Carotid Bruit] : no bruit heard over the right carotid [Left Carotid Bruit] : no bruit heard over the left carotid [FreeTextEntry1] : Deferred

## 2019-11-12 ENCOUNTER — INPATIENT (INPATIENT)
Facility: HOSPITAL | Age: 64
LOS: 5 days | Discharge: HOME | DRG: 220 | End: 2019-11-18
Attending: THORACIC SURGERY (CARDIOTHORACIC VASCULAR SURGERY) | Admitting: THORACIC SURGERY (CARDIOTHORACIC VASCULAR SURGERY)
Payer: COMMERCIAL

## 2019-11-12 ENCOUNTER — RESULT REVIEW (OUTPATIENT)
Age: 64
End: 2019-11-12

## 2019-11-12 VITALS
RESPIRATION RATE: 16 BRPM | OXYGEN SATURATION: 97 % | TEMPERATURE: 98 F | DIASTOLIC BLOOD PRESSURE: 72 MMHG | HEART RATE: 89 BPM | SYSTOLIC BLOOD PRESSURE: 112 MMHG | WEIGHT: 227.96 LBS | HEIGHT: 63 IN

## 2019-11-12 DIAGNOSIS — Z98.890 OTHER SPECIFIED POSTPROCEDURAL STATES: Chronic | ICD-10-CM

## 2019-11-12 DIAGNOSIS — I71.9 AORTIC ANEURYSM OF UNSPECIFIED SITE, WITHOUT RUPTURE: ICD-10-CM

## 2019-11-12 DIAGNOSIS — I35.0 NONRHEUMATIC AORTIC (VALVE) STENOSIS: ICD-10-CM

## 2019-11-12 LAB
ALBUMIN SERPL ELPH-MCNC: 3.8 G/DL — SIGNIFICANT CHANGE UP (ref 3.3–5)
ALP SERPL-CCNC: 45 U/L — SIGNIFICANT CHANGE UP (ref 40–120)
ALT FLD-CCNC: 35 U/L — SIGNIFICANT CHANGE UP (ref 10–45)
ANION GAP SERPL CALC-SCNC: 13 MMOL/L — SIGNIFICANT CHANGE UP (ref 5–17)
APTT BLD: 30.8 SEC — SIGNIFICANT CHANGE UP (ref 27.5–36.3)
AST SERPL-CCNC: 55 U/L — HIGH (ref 10–40)
BASE EXCESS BLDV CALC-SCNC: 1 MMOL/L — SIGNIFICANT CHANGE UP (ref -2–2)
BASE EXCESS BLDV CALC-SCNC: 1.4 MMOL/L — SIGNIFICANT CHANGE UP (ref -2–2)
BASOPHILS # BLD AUTO: 0.01 K/UL — SIGNIFICANT CHANGE UP (ref 0–0.2)
BASOPHILS NFR BLD AUTO: 0.1 % — SIGNIFICANT CHANGE UP (ref 0–2)
BILIRUB SERPL-MCNC: 1 MG/DL — SIGNIFICANT CHANGE UP (ref 0.2–1.2)
BLOOD GAS VENOUS - CREATININE: SIGNIFICANT CHANGE UP MG/DL (ref 0.5–1.3)
BLOOD GAS VENOUS - CREATININE: SIGNIFICANT CHANGE UP MG/DL (ref 0.5–1.3)
BUN SERPL-MCNC: 9 MG/DL — SIGNIFICANT CHANGE UP (ref 7–23)
CA-I SERPL-SCNC: 0.94 MMOL/L — LOW (ref 1.12–1.3)
CA-I SERPL-SCNC: 0.94 MMOL/L — LOW (ref 1.12–1.3)
CALCIUM SERPL-MCNC: 7.9 MG/DL — LOW (ref 8.4–10.5)
CHLORIDE BLDV-SCNC: SIGNIFICANT CHANGE UP MMOL/L (ref 96–108)
CHLORIDE BLDV-SCNC: SIGNIFICANT CHANGE UP MMOL/L (ref 96–108)
CHLORIDE SERPL-SCNC: 108 MMOL/L — SIGNIFICANT CHANGE UP (ref 96–108)
CK MB BLD-MCNC: 7.6 % — HIGH (ref 0–3.5)
CK MB CFR SERPL CALC: 21.2 NG/ML — HIGH (ref 0–3.8)
CK SERPL-CCNC: 279 U/L — HIGH (ref 25–170)
CO2 SERPL-SCNC: 24 MMOL/L — SIGNIFICANT CHANGE UP (ref 22–31)
CREAT SERPL-MCNC: 0.36 MG/DL — LOW (ref 0.5–1.3)
EOSINOPHIL # BLD AUTO: 0.02 K/UL — SIGNIFICANT CHANGE UP (ref 0–0.5)
EOSINOPHIL NFR BLD AUTO: 0.2 % — SIGNIFICANT CHANGE UP (ref 0–6)
FIBRINOGEN PPP-MCNC: 444 MG/DL — SIGNIFICANT CHANGE UP (ref 350–510)
GAS PNL BLDA: SIGNIFICANT CHANGE UP
GAS PNL BLDV: 140 MMOL/L — SIGNIFICANT CHANGE UP (ref 135–145)
GAS PNL BLDV: 142 MMOL/L — SIGNIFICANT CHANGE UP (ref 135–145)
GAS PNL BLDV: SIGNIFICANT CHANGE UP
GLUCOSE BLDV-MCNC: 162 MG/DL — HIGH (ref 70–99)
GLUCOSE BLDV-MCNC: 163 MG/DL — HIGH (ref 70–99)
GLUCOSE SERPL-MCNC: 216 MG/DL — HIGH (ref 70–99)
HCO3 BLDV-SCNC: 27 MMOL/L — SIGNIFICANT CHANGE UP (ref 21–29)
HCO3 BLDV-SCNC: 27 MMOL/L — SIGNIFICANT CHANGE UP (ref 21–29)
HCT VFR BLD CALC: 30.1 % — LOW (ref 34.5–45)
HCT VFR BLDA CALC: 28 % — LOW (ref 39–50)
HCT VFR BLDA CALC: 28 % — LOW (ref 39–50)
HEPARINASE TEG R TIME: 9.2 MIN (ref 4.3–8.3)
HGB BLD CALC-MCNC: 8.9 G/DL — LOW (ref 11.5–15.5)
HGB BLD CALC-MCNC: 9 G/DL — LOW (ref 11.5–15.5)
HGB BLD-MCNC: 10.1 G/DL — LOW (ref 11.5–15.5)
HOROWITZ INDEX BLDV+IHG-RTO: 0 — SIGNIFICANT CHANGE UP
HOROWITZ INDEX BLDV+IHG-RTO: 0 — SIGNIFICANT CHANGE UP
IMM GRANULOCYTES NFR BLD AUTO: 0.9 % — SIGNIFICANT CHANGE UP (ref 0–1.5)
INR BLD: 1.34 RATIO — HIGH (ref 0.88–1.16)
LACTATE BLDV-MCNC: 0.9 MMOL/L — SIGNIFICANT CHANGE UP (ref 0.7–2)
LACTATE BLDV-MCNC: 0.9 MMOL/L — SIGNIFICANT CHANGE UP (ref 0.7–2)
LYMPHOCYTES # BLD AUTO: 0.97 K/UL — LOW (ref 1–3.3)
LYMPHOCYTES # BLD AUTO: 9.1 % — LOW (ref 13–44)
MAGNESIUM SERPL-MCNC: 2.8 MG/DL — HIGH (ref 1.6–2.6)
MCHC RBC-ENTMCNC: 29.4 PG — SIGNIFICANT CHANGE UP (ref 27–34)
MCHC RBC-ENTMCNC: 33.6 GM/DL — SIGNIFICANT CHANGE UP (ref 32–36)
MCV RBC AUTO: 87.8 FL — SIGNIFICANT CHANGE UP (ref 80–100)
MONOCYTES # BLD AUTO: 0.66 K/UL — SIGNIFICANT CHANGE UP (ref 0–0.9)
MONOCYTES NFR BLD AUTO: 6.2 % — SIGNIFICANT CHANGE UP (ref 2–14)
NEUTROPHILS # BLD AUTO: 8.87 K/UL — HIGH (ref 1.8–7.4)
NEUTROPHILS NFR BLD AUTO: 83.5 % — HIGH (ref 43–77)
NRBC # BLD: 0 /100 WBCS — SIGNIFICANT CHANGE UP (ref 0–0)
PCO2 BLDV: 51 MMHG — HIGH (ref 35–50)
PCO2 BLDV: 52 MMHG — HIGH (ref 35–50)
PH BLDV: 7.34 — LOW (ref 7.35–7.45)
PH BLDV: 7.34 — LOW (ref 7.35–7.45)
PHOSPHATE SERPL-MCNC: 2.8 MG/DL — SIGNIFICANT CHANGE UP (ref 2.5–4.5)
PLATELET # BLD AUTO: 187 K/UL — SIGNIFICANT CHANGE UP (ref 150–400)
PO2 BLDV: 52 MMHG — HIGH (ref 25–45)
PO2 BLDV: 67 MMHG — HIGH (ref 25–45)
POTASSIUM BLDV-SCNC: 5.1 MMOL/L — HIGH (ref 3.5–5)
POTASSIUM BLDV-SCNC: 5.1 MMOL/L — HIGH (ref 3.5–5)
POTASSIUM SERPL-MCNC: 3.8 MMOL/L — SIGNIFICANT CHANGE UP (ref 3.5–5.3)
POTASSIUM SERPL-SCNC: 3.8 MMOL/L — SIGNIFICANT CHANGE UP (ref 3.5–5.3)
PROT SERPL-MCNC: 5.7 G/DL — LOW (ref 6–8.3)
PROTHROM AB SERPL-ACNC: 15.6 SEC — HIGH (ref 10–12.9)
RAPIDTEG MAXIMUM AMPLITUDE: 65 MM — SIGNIFICANT CHANGE UP (ref 52–70)
RBC # BLD: 3.43 M/UL — LOW (ref 3.8–5.2)
RBC # FLD: 15.6 % — HIGH (ref 10.3–14.5)
RH IG SCN BLD-IMP: POSITIVE — SIGNIFICANT CHANGE UP
SAO2 % BLDV: 83 % — SIGNIFICANT CHANGE UP (ref 67–88)
SAO2 % BLDV: 91 % — HIGH (ref 67–88)
SODIUM SERPL-SCNC: 145 MMOL/L — SIGNIFICANT CHANGE UP (ref 135–145)
TEG FUNCTIONAL FIBRINOGEN: 25.6 MM — SIGNIFICANT CHANGE UP (ref 15–32)
TEG MAXIMUM AMPLITUDE: 65.4 MM — SIGNIFICANT CHANGE UP (ref 52–69)
TEG REACTION TIME: 8.7 MIN — SIGNIFICANT CHANGE UP (ref 4.6–9.1)
TROPONIN T, HIGH SENSITIVITY RESULT: 426 NG/L — HIGH (ref 0–51)
WBC # BLD: 10.63 K/UL — HIGH (ref 3.8–10.5)
WBC # FLD AUTO: 10.63 K/UL — HIGH (ref 3.8–10.5)

## 2019-11-12 PROCEDURE — 33863 ASCENDING AORTIC GRAFT: CPT | Mod: AS

## 2019-11-12 PROCEDURE — 33863 ASCENDING AORTIC GRAFT: CPT

## 2019-11-12 PROCEDURE — 88312 SPECIAL STAINS GROUP 1: CPT | Mod: 26

## 2019-11-12 PROCEDURE — ZZZZZ: CPT

## 2019-11-12 PROCEDURE — 33866 AORTIC HEMIARCH GRAFT: CPT

## 2019-11-12 PROCEDURE — 88305 TISSUE EXAM BY PATHOLOGIST: CPT | Mod: 26

## 2019-11-12 PROCEDURE — 71045 X-RAY EXAM CHEST 1 VIEW: CPT | Mod: 26

## 2019-11-12 PROCEDURE — 93010 ELECTROCARDIOGRAM REPORT: CPT

## 2019-11-12 PROCEDURE — 99291 CRITICAL CARE FIRST HOUR: CPT

## 2019-11-12 RX ORDER — MEPERIDINE HYDROCHLORIDE 50 MG/ML
25 INJECTION INTRAMUSCULAR; INTRAVENOUS; SUBCUTANEOUS ONCE
Refills: 0 | Status: DISCONTINUED | OUTPATIENT
Start: 2019-11-12 | End: 2019-11-13

## 2019-11-12 RX ORDER — DEXTROSE 50 % IN WATER 50 %
50 SYRINGE (ML) INTRAVENOUS
Refills: 0 | Status: DISCONTINUED | OUTPATIENT
Start: 2019-11-12 | End: 2019-11-18

## 2019-11-12 RX ORDER — MONTELUKAST 4 MG/1
1 TABLET, CHEWABLE ORAL
Qty: 0 | Refills: 0 | DISCHARGE

## 2019-11-12 RX ORDER — NICARDIPINE HYDROCHLORIDE 30 MG/1
3 CAPSULE, EXTENDED RELEASE ORAL
Qty: 40 | Refills: 0 | Status: DISCONTINUED | OUTPATIENT
Start: 2019-11-12 | End: 2019-11-13

## 2019-11-12 RX ORDER — POTASSIUM CHLORIDE 20 MEQ
10 PACKET (EA) ORAL
Refills: 0 | Status: COMPLETED | OUTPATIENT
Start: 2019-11-12 | End: 2019-11-12

## 2019-11-12 RX ORDER — CHLORHEXIDINE GLUCONATE 213 G/1000ML
1 SOLUTION TOPICAL ONCE
Refills: 0 | Status: DISCONTINUED | OUTPATIENT
Start: 2019-11-12 | End: 2019-11-12

## 2019-11-12 RX ORDER — CHLORHEXIDINE GLUCONATE 213 G/1000ML
15 SOLUTION TOPICAL EVERY 12 HOURS
Refills: 0 | Status: DISCONTINUED | OUTPATIENT
Start: 2019-11-12 | End: 2019-11-12

## 2019-11-12 RX ORDER — CHLORHEXIDINE GLUCONATE 213 G/1000ML
15 SOLUTION TOPICAL EVERY 12 HOURS
Refills: 0 | Status: DISCONTINUED | OUTPATIENT
Start: 2019-11-12 | End: 2019-11-13

## 2019-11-12 RX ORDER — NOREPINEPHRINE BITARTRATE/D5W 8 MG/250ML
0.05 PLASTIC BAG, INJECTION (ML) INTRAVENOUS
Qty: 8 | Refills: 0 | Status: DISCONTINUED | OUTPATIENT
Start: 2019-11-12 | End: 2019-11-13

## 2019-11-12 RX ORDER — ATORVASTATIN CALCIUM 80 MG/1
1 TABLET, FILM COATED ORAL
Qty: 0 | Refills: 0 | DISCHARGE

## 2019-11-12 RX ORDER — LIDOCAINE HCL 20 MG/ML
0.2 VIAL (ML) INJECTION ONCE
Refills: 0 | Status: DISCONTINUED | OUTPATIENT
Start: 2019-11-12 | End: 2019-11-12

## 2019-11-12 RX ORDER — CALCIUM GLUCONATE 100 MG/ML
1 VIAL (ML) INTRAVENOUS ONCE
Refills: 0 | Status: COMPLETED | OUTPATIENT
Start: 2019-11-12 | End: 2019-11-12

## 2019-11-12 RX ORDER — SODIUM CHLORIDE 9 MG/ML
1000 INJECTION, SOLUTION INTRAVENOUS
Refills: 0 | Status: DISCONTINUED | OUTPATIENT
Start: 2019-11-12 | End: 2019-11-12

## 2019-11-12 RX ORDER — SODIUM CHLORIDE 9 MG/ML
500 INJECTION, SOLUTION INTRAVENOUS ONCE
Refills: 0 | Status: COMPLETED | OUTPATIENT
Start: 2019-11-12 | End: 2019-11-12

## 2019-11-12 RX ORDER — SODIUM CHLORIDE 9 MG/ML
250 INJECTION, SOLUTION INTRAVENOUS ONCE
Refills: 0 | Status: COMPLETED | OUTPATIENT
Start: 2019-11-12 | End: 2019-11-12

## 2019-11-12 RX ORDER — MAGNESIUM SULFATE 500 MG/ML
2 VIAL (ML) INJECTION ONCE
Refills: 0 | Status: COMPLETED | OUTPATIENT
Start: 2019-11-12 | End: 2019-11-12

## 2019-11-12 RX ORDER — SODIUM CHLORIDE 9 MG/ML
1000 INJECTION INTRAMUSCULAR; INTRAVENOUS; SUBCUTANEOUS
Refills: 0 | Status: DISCONTINUED | OUTPATIENT
Start: 2019-11-12 | End: 2019-11-14

## 2019-11-12 RX ORDER — CHLORHEXIDINE GLUCONATE 213 G/1000ML
1 SOLUTION TOPICAL
Refills: 0 | Status: DISCONTINUED | OUTPATIENT
Start: 2019-11-12 | End: 2019-11-18

## 2019-11-12 RX ORDER — PANTOPRAZOLE SODIUM 20 MG/1
40 TABLET, DELAYED RELEASE ORAL DAILY
Refills: 0 | Status: DISCONTINUED | OUTPATIENT
Start: 2019-11-12 | End: 2019-11-13

## 2019-11-12 RX ORDER — POTASSIUM CHLORIDE 20 MEQ
10 PACKET (EA) ORAL
Refills: 0 | Status: DISCONTINUED | OUTPATIENT
Start: 2019-11-12 | End: 2019-11-17

## 2019-11-12 RX ORDER — ALBUMIN HUMAN 25 %
250 VIAL (ML) INTRAVENOUS ONCE
Refills: 0 | Status: COMPLETED | OUTPATIENT
Start: 2019-11-12 | End: 2019-11-12

## 2019-11-12 RX ORDER — CHLORHEXIDINE GLUCONATE 213 G/1000ML
5 SOLUTION TOPICAL EVERY 4 HOURS
Refills: 0 | Status: DISCONTINUED | OUTPATIENT
Start: 2019-11-12 | End: 2019-11-12

## 2019-11-12 RX ORDER — INSULIN HUMAN 100 [IU]/ML
3 INJECTION, SOLUTION SUBCUTANEOUS
Qty: 100 | Refills: 0 | Status: DISCONTINUED | OUTPATIENT
Start: 2019-11-12 | End: 2019-11-13

## 2019-11-12 RX ORDER — SODIUM CHLORIDE 9 MG/ML
3 INJECTION INTRAMUSCULAR; INTRAVENOUS; SUBCUTANEOUS EVERY 8 HOURS
Refills: 0 | Status: DISCONTINUED | OUTPATIENT
Start: 2019-11-12 | End: 2019-11-12

## 2019-11-12 RX ORDER — CHOLECALCIFEROL (VITAMIN D3) 125 MCG
1 CAPSULE ORAL
Qty: 0 | Refills: 0 | DISCHARGE

## 2019-11-12 RX ORDER — DEXTROSE 50 % IN WATER 50 %
25 SYRINGE (ML) INTRAVENOUS
Refills: 0 | Status: DISCONTINUED | OUTPATIENT
Start: 2019-11-12 | End: 2019-11-18

## 2019-11-12 RX ORDER — CEFUROXIME AXETIL 250 MG
1500 TABLET ORAL EVERY 8 HOURS
Refills: 0 | Status: COMPLETED | OUTPATIENT
Start: 2019-11-12 | End: 2019-11-14

## 2019-11-12 RX ORDER — ASPIRIN/CALCIUM CARB/MAGNESIUM 324 MG
300 TABLET ORAL ONCE
Refills: 0 | Status: DISCONTINUED | OUTPATIENT
Start: 2019-11-12 | End: 2019-11-12

## 2019-11-12 RX ORDER — HYDROMORPHONE HYDROCHLORIDE 2 MG/ML
0.5 INJECTION INTRAMUSCULAR; INTRAVENOUS; SUBCUTANEOUS ONCE
Refills: 0 | Status: DISCONTINUED | OUTPATIENT
Start: 2019-11-12 | End: 2019-11-12

## 2019-11-12 RX ORDER — POLYETHYLENE GLYCOL 3350 17 G/17G
17 POWDER, FOR SOLUTION ORAL DAILY
Refills: 0 | Status: DISCONTINUED | OUTPATIENT
Start: 2019-11-12 | End: 2019-11-18

## 2019-11-12 RX ORDER — ASPIRIN/CALCIUM CARB/MAGNESIUM 324 MG
81 TABLET ORAL DAILY
Refills: 0 | Status: DISCONTINUED | OUTPATIENT
Start: 2019-11-12 | End: 2019-11-18

## 2019-11-12 RX ADMIN — Medication 125 MILLILITER(S): at 19:00

## 2019-11-12 RX ADMIN — Medication 100 MILLIEQUIVALENT(S): at 15:00

## 2019-11-12 RX ADMIN — PANTOPRAZOLE SODIUM 40 MILLIGRAM(S): 20 TABLET, DELAYED RELEASE ORAL at 17:05

## 2019-11-12 RX ADMIN — HYDROMORPHONE HYDROCHLORIDE 0.5 MILLIGRAM(S): 2 INJECTION INTRAMUSCULAR; INTRAVENOUS; SUBCUTANEOUS at 20:00

## 2019-11-12 RX ADMIN — Medication 100 MILLIEQUIVALENT(S): at 16:00

## 2019-11-12 RX ADMIN — SODIUM CHLORIDE 1500 MILLILITER(S): 9 INJECTION, SOLUTION INTRAVENOUS at 16:35

## 2019-11-12 RX ADMIN — HYDROMORPHONE HYDROCHLORIDE 0.5 MILLIGRAM(S): 2 INJECTION INTRAMUSCULAR; INTRAVENOUS; SUBCUTANEOUS at 20:15

## 2019-11-12 RX ADMIN — Medication 9.69 MICROGRAM(S)/KG/MIN: at 18:46

## 2019-11-12 RX ADMIN — Medication 100 MILLIEQUIVALENT(S): at 20:04

## 2019-11-12 RX ADMIN — Medication 100 MILLIEQUIVALENT(S): at 20:47

## 2019-11-12 RX ADMIN — Medication 100 MILLIEQUIVALENT(S): at 17:00

## 2019-11-12 RX ADMIN — CHLORHEXIDINE GLUCONATE 15 MILLILITER(S): 213 SOLUTION TOPICAL at 17:06

## 2019-11-12 RX ADMIN — Medication 50 GRAM(S): at 17:41

## 2019-11-12 RX ADMIN — SODIUM CHLORIDE 3000 MILLILITER(S): 9 INJECTION, SOLUTION INTRAVENOUS at 16:00

## 2019-11-12 RX ADMIN — Medication 100 MILLIEQUIVALENT(S): at 18:00

## 2019-11-12 RX ADMIN — SODIUM CHLORIDE 750 MILLILITER(S): 9 INJECTION, SOLUTION INTRAVENOUS at 18:46

## 2019-11-12 RX ADMIN — Medication 100 MILLIEQUIVALENT(S): at 23:15

## 2019-11-12 RX ADMIN — SODIUM CHLORIDE 500 MILLILITER(S): 9 INJECTION, SOLUTION INTRAVENOUS at 17:06

## 2019-11-12 RX ADMIN — Medication 100 MILLIEQUIVALENT(S): at 21:08

## 2019-11-12 RX ADMIN — NICARDIPINE HYDROCHLORIDE 15 MG/HR: 30 CAPSULE, EXTENDED RELEASE ORAL at 16:56

## 2019-11-12 RX ADMIN — Medication 200 GRAM(S): at 19:01

## 2019-11-12 RX ADMIN — Medication 100 MILLIGRAM(S): at 17:05

## 2019-11-12 RX ADMIN — INSULIN HUMAN 3 UNIT(S)/HR: 100 INJECTION, SOLUTION SUBCUTANEOUS at 16:57

## 2019-11-12 NOTE — BRIEF OPERATIVE NOTE - NSICDXBRIEFPOSTOP_GEN_ALL_CORE_FT
POST-OP DIAGNOSIS:  Aortic insufficiency 12-Nov-2019 15:04:06  Grant Vides  Ascending aortic aneurysm 12-Nov-2019 15:03:57  Grant Vides

## 2019-11-12 NOTE — PROGRESS NOTE ADULT - SUBJECTIVE AND OBJECTIVE BOX
CRITICAL CARE ATTENDING - CTICU    MEDICATIONS  (STANDING):  aspirin enteric coated 81 milliGRAM(s) Oral daily  cefuroxime  IVPB 1500 milliGRAM(s) IV Intermittent once  cefuroxime  IVPB 1500 milliGRAM(s) IV Intermittent every 8 hours  chlorhexidine 0.12% Liquid 15 milliLiter(s) Oral Mucosa every 12 hours  chlorhexidine 0.12% Liquid 5 milliLiter(s) Oral Mucosa every 4 hours  chlorhexidine 2% Cloths 1 Application(s) Topical <User Schedule>  dextrose 5%. 1000 milliLiter(s) (15 mL/Hr) IV Continuous <Continuous>  dextrose 50% Injectable 50 milliLiter(s) IV Push every 15 minutes  dextrose 50% Injectable 25 milliLiter(s) IV Push every 15 minutes  insulin regular Infusion 3 Unit(s)/Hr (3 mL/Hr) IV Continuous <Continuous>  meperidine     Injectable 25 milliGRAM(s) IV Push once  niCARdipine Infusion 3 mG/Hr (15 mL/Hr) IV Continuous <Continuous>  pantoprazole  Injectable 40 milliGRAM(s) IV Push daily  polyethylene glycol 3350 17 Gram(s) Oral daily  potassium chloride  10 mEq/50 mL IVPB 10 milliEquivalent(s) IV Intermittent every 1 hour  potassium chloride  10 mEq/50 mL IVPB 10 milliEquivalent(s) IV Intermittent every 1 hour  potassium chloride  10 mEq/50 mL IVPB 10 milliEquivalent(s) IV Intermittent every 1 hour  sodium chloride 0.9%. 1000 milliLiter(s) (10 mL/Hr) IV Continuous <Continuous>                              Mode: AC/ CMV (Assist Control/ Continuous Mandatory Ventilation)  RR (machine): 10  TV (machine): 700  FiO2: 100  PEEP: 5  ITime: 1  MAP: 11  PIP: 29      Daily Height in cm: 160.02 (12 Nov 2019 08:36)    Daily         Critically Ill patient  : [ ] preoperative ,   [ x] post operative    Requires :  [x ] Arterial Line   [x ] Central Line  [ ] PA catheter  [ ] IABP  [ ] ECMO  [ ] LVAD  [ x] Ventilator  [x ] pacemaker [ ] Impella.                      [x ] ABG's     [ x] Pulse Oxymetry Monitoring  Bedside evaluation , monitoring , treatment of hemodynamics , fluids , IVP/ IVCD meds.        Diagnosis:     Op Day - Asc. Aortic replacement  / hemiArch replacement / Bental     Hypertension     Hemodynamic lability ,instability. Requires IVCD [ ] vasopressors [ ] inotropes  [ x] vasodilator  [ ]IVSS fluid  to maintain MAP, perfusion, C.I.     Temporary pacemaker (TPM) interrogation and setting.     Chest Tube Drainage     Ventilator Management:  [ x]AC-rest    [x ]CPAP-PS Wean this PM     [ ]Trach Collar     [ ]Extubate    [ ] T-Piece  [ ]peep>5     Requires chest PT, pulmonary toilet, ambu bagging, suctioning to maintain SaO2,  patent airway and treat atelectasis.     Obesity OHS / FANG     may require Extubation to BIPAP     ECG     IVCD Insulin                         Discussed with CT surgeon, Physician's Assistant - Nurse Practitioner- Critical care medicine team.   Dicussed at  AM / PM rounds.   Chart, labs , films reviewed.    Total Time:

## 2019-11-12 NOTE — AIRWAY REMOVAL NOTE  ADULT & PEDS - ARTIFICAL AIRWAY REMOVAL COMMENTS
Written order for extubation verified. The patient was identified by full name and birth date compared to the identification band. Present during the procedure was RN Lindsay

## 2019-11-12 NOTE — BRIEF OPERATIVE NOTE - NSICDXBRIEFPREOP_GEN_ALL_CORE_FT
PRE-OP DIAGNOSIS:  Ascending aortic aneurysm 12-Nov-2019 15:03:07  Grant Vides  Aortic insufficiency 12-Nov-2019 15:02:36  Grant Vides

## 2019-11-12 NOTE — BRIEF OPERATIVE NOTE - NSICDXBRIEFPROCEDURE_GEN_ALL_CORE_FT
PROCEDURES:  Replacement, aortic valve, aortic root, and ascending aorta 12-Nov-2019 15:07:31 23mm Inspra Biological Aortic Valve  30mm Gelweave Aortic Graft Grant Vides

## 2019-11-13 DIAGNOSIS — Z95.828 PRESENCE OF OTHER VASCULAR IMPLANTS AND GRAFTS: ICD-10-CM

## 2019-11-13 DIAGNOSIS — Z95.2 PRESENCE OF PROSTHETIC HEART VALVE: ICD-10-CM

## 2019-11-13 LAB
ALBUMIN SERPL ELPH-MCNC: 4.2 G/DL — SIGNIFICANT CHANGE UP (ref 3.3–5)
ALP SERPL-CCNC: 44 U/L — SIGNIFICANT CHANGE UP (ref 40–120)
ALT FLD-CCNC: 35 U/L — SIGNIFICANT CHANGE UP (ref 10–45)
ANION GAP SERPL CALC-SCNC: 10 MMOL/L — SIGNIFICANT CHANGE UP (ref 5–17)
APTT BLD: 32.7 SEC — SIGNIFICANT CHANGE UP (ref 27.5–36.3)
AST SERPL-CCNC: 50 U/L — HIGH (ref 10–40)
BILIRUB SERPL-MCNC: 0.5 MG/DL — SIGNIFICANT CHANGE UP (ref 0.2–1.2)
BUN SERPL-MCNC: 8 MG/DL — SIGNIFICANT CHANGE UP (ref 7–23)
CALCIUM SERPL-MCNC: 8.1 MG/DL — LOW (ref 8.4–10.5)
CHLORIDE SERPL-SCNC: 110 MMOL/L — HIGH (ref 96–108)
CO2 SERPL-SCNC: 25 MMOL/L — SIGNIFICANT CHANGE UP (ref 22–31)
CREAT SERPL-MCNC: 0.41 MG/DL — LOW (ref 0.5–1.3)
GAS PNL BLDA: SIGNIFICANT CHANGE UP
GLUCOSE SERPL-MCNC: 123 MG/DL — HIGH (ref 70–99)
HCT VFR BLD CALC: 28.8 % — LOW (ref 34.5–45)
HGB BLD-MCNC: 9.7 G/DL — LOW (ref 11.5–15.5)
INR BLD: 1.25 RATIO — HIGH (ref 0.88–1.16)
MAGNESIUM SERPL-MCNC: 2.8 MG/DL — HIGH (ref 1.6–2.6)
MCHC RBC-ENTMCNC: 29.9 PG — SIGNIFICANT CHANGE UP (ref 27–34)
MCHC RBC-ENTMCNC: 33.7 GM/DL — SIGNIFICANT CHANGE UP (ref 32–36)
MCV RBC AUTO: 88.9 FL — SIGNIFICANT CHANGE UP (ref 80–100)
NRBC # BLD: 0 /100 WBCS — SIGNIFICANT CHANGE UP (ref 0–0)
PHOSPHATE SERPL-MCNC: 3 MG/DL — SIGNIFICANT CHANGE UP (ref 2.5–4.5)
PLATELET # BLD AUTO: 210 K/UL — SIGNIFICANT CHANGE UP (ref 150–400)
POTASSIUM SERPL-MCNC: 4.5 MMOL/L — SIGNIFICANT CHANGE UP (ref 3.5–5.3)
POTASSIUM SERPL-SCNC: 4.5 MMOL/L — SIGNIFICANT CHANGE UP (ref 3.5–5.3)
PROT SERPL-MCNC: 6.1 G/DL — SIGNIFICANT CHANGE UP (ref 6–8.3)
PROTHROM AB SERPL-ACNC: 14.5 SEC — HIGH (ref 10–12.9)
RBC # BLD: 3.24 M/UL — LOW (ref 3.8–5.2)
RBC # FLD: 15.9 % — HIGH (ref 10.3–14.5)
SODIUM SERPL-SCNC: 145 MMOL/L — SIGNIFICANT CHANGE UP (ref 135–145)
WBC # BLD: 11.79 K/UL — HIGH (ref 3.8–10.5)
WBC # FLD AUTO: 11.79 K/UL — HIGH (ref 3.8–10.5)

## 2019-11-13 PROCEDURE — 99291 CRITICAL CARE FIRST HOUR: CPT

## 2019-11-13 PROCEDURE — 93010 ELECTROCARDIOGRAM REPORT: CPT

## 2019-11-13 PROCEDURE — 71045 X-RAY EXAM CHEST 1 VIEW: CPT | Mod: 26

## 2019-11-13 RX ORDER — PANTOPRAZOLE SODIUM 20 MG/1
40 TABLET, DELAYED RELEASE ORAL
Refills: 0 | Status: DISCONTINUED | OUTPATIENT
Start: 2019-11-13 | End: 2019-11-18

## 2019-11-13 RX ORDER — OXYCODONE AND ACETAMINOPHEN 5; 325 MG/1; MG/1
2 TABLET ORAL EVERY 6 HOURS
Refills: 0 | Status: DISCONTINUED | OUTPATIENT
Start: 2019-11-13 | End: 2019-11-18

## 2019-11-13 RX ORDER — ENOXAPARIN SODIUM 100 MG/ML
30 INJECTION SUBCUTANEOUS
Refills: 0 | Status: DISCONTINUED | OUTPATIENT
Start: 2019-11-13 | End: 2019-11-18

## 2019-11-13 RX ORDER — SODIUM CHLORIDE 9 MG/ML
250 INJECTION INTRAMUSCULAR; INTRAVENOUS; SUBCUTANEOUS ONCE
Refills: 0 | Status: COMPLETED | OUTPATIENT
Start: 2019-11-13 | End: 2019-11-13

## 2019-11-13 RX ORDER — ALBUMIN HUMAN 25 %
250 VIAL (ML) INTRAVENOUS ONCE
Refills: 0 | Status: COMPLETED | OUTPATIENT
Start: 2019-11-13 | End: 2019-11-13

## 2019-11-13 RX ORDER — HYDROMORPHONE HYDROCHLORIDE 2 MG/ML
0.5 INJECTION INTRAMUSCULAR; INTRAVENOUS; SUBCUTANEOUS ONCE
Refills: 0 | Status: DISCONTINUED | OUTPATIENT
Start: 2019-11-13 | End: 2019-11-13

## 2019-11-13 RX ORDER — METOPROLOL TARTRATE 50 MG
25 TABLET ORAL EVERY 8 HOURS
Refills: 0 | Status: DISCONTINUED | OUTPATIENT
Start: 2019-11-13 | End: 2019-11-14

## 2019-11-13 RX ORDER — ENOXAPARIN SODIUM 100 MG/ML
40 INJECTION SUBCUTANEOUS DAILY
Refills: 0 | Status: DISCONTINUED | OUTPATIENT
Start: 2019-11-13 | End: 2019-11-13

## 2019-11-13 RX ORDER — POTASSIUM CHLORIDE 20 MEQ
10 PACKET (EA) ORAL
Refills: 0 | Status: DISCONTINUED | OUTPATIENT
Start: 2019-11-13 | End: 2019-11-13

## 2019-11-13 RX ORDER — ATORVASTATIN CALCIUM 80 MG/1
20 TABLET, FILM COATED ORAL AT BEDTIME
Refills: 0 | Status: DISCONTINUED | OUTPATIENT
Start: 2019-11-13 | End: 2019-11-18

## 2019-11-13 RX ORDER — INSULIN LISPRO 100/ML
VIAL (ML) SUBCUTANEOUS
Refills: 0 | Status: DISCONTINUED | OUTPATIENT
Start: 2019-11-13 | End: 2019-11-18

## 2019-11-13 RX ORDER — METOPROLOL TARTRATE 50 MG
12.5 TABLET ORAL
Refills: 0 | Status: DISCONTINUED | OUTPATIENT
Start: 2019-11-13 | End: 2019-11-13

## 2019-11-13 RX ORDER — OXYCODONE AND ACETAMINOPHEN 5; 325 MG/1; MG/1
1 TABLET ORAL EVERY 6 HOURS
Refills: 0 | Status: DISCONTINUED | OUTPATIENT
Start: 2019-11-13 | End: 2019-11-18

## 2019-11-13 RX ORDER — POTASSIUM CHLORIDE 20 MEQ
10 PACKET (EA) ORAL
Refills: 0 | Status: COMPLETED | OUTPATIENT
Start: 2019-11-13 | End: 2019-11-13

## 2019-11-13 RX ORDER — METOPROLOL TARTRATE 50 MG
12.5 TABLET ORAL EVERY 8 HOURS
Refills: 0 | Status: DISCONTINUED | OUTPATIENT
Start: 2019-11-13 | End: 2019-11-13

## 2019-11-13 RX ADMIN — OXYCODONE AND ACETAMINOPHEN 1 TABLET(S): 5; 325 TABLET ORAL at 20:33

## 2019-11-13 RX ADMIN — Medication 12.5 MILLIGRAM(S): at 21:48

## 2019-11-13 RX ADMIN — POLYETHYLENE GLYCOL 3350 17 GRAM(S): 17 POWDER, FOR SOLUTION ORAL at 13:01

## 2019-11-13 RX ADMIN — OXYCODONE AND ACETAMINOPHEN 1 TABLET(S): 5; 325 TABLET ORAL at 12:15

## 2019-11-13 RX ADMIN — Medication 100 MILLIEQUIVALENT(S): at 06:19

## 2019-11-13 RX ADMIN — Medication 12.5 MILLIGRAM(S): at 13:01

## 2019-11-13 RX ADMIN — Medication 100 MILLIGRAM(S): at 16:59

## 2019-11-13 RX ADMIN — HYDROMORPHONE HYDROCHLORIDE 0.5 MILLIGRAM(S): 2 INJECTION INTRAMUSCULAR; INTRAVENOUS; SUBCUTANEOUS at 05:40

## 2019-11-13 RX ADMIN — CHLORHEXIDINE GLUCONATE 1 APPLICATION(S): 213 SOLUTION TOPICAL at 05:20

## 2019-11-13 RX ADMIN — Medication 81 MILLIGRAM(S): at 11:45

## 2019-11-13 RX ADMIN — CHLORHEXIDINE GLUCONATE 15 MILLILITER(S): 213 SOLUTION TOPICAL at 05:22

## 2019-11-13 RX ADMIN — OXYCODONE AND ACETAMINOPHEN 2 TABLET(S): 5; 325 TABLET ORAL at 16:00

## 2019-11-13 RX ADMIN — OXYCODONE AND ACETAMINOPHEN 1 TABLET(S): 5; 325 TABLET ORAL at 21:30

## 2019-11-13 RX ADMIN — Medication 100 MILLIGRAM(S): at 00:23

## 2019-11-13 RX ADMIN — Medication 100 MILLIEQUIVALENT(S): at 04:37

## 2019-11-13 RX ADMIN — Medication 2: at 08:23

## 2019-11-13 RX ADMIN — ATORVASTATIN CALCIUM 20 MILLIGRAM(S): 80 TABLET, FILM COATED ORAL at 21:48

## 2019-11-13 RX ADMIN — HYDROMORPHONE HYDROCHLORIDE 0.5 MILLIGRAM(S): 2 INJECTION INTRAMUSCULAR; INTRAVENOUS; SUBCUTANEOUS at 05:55

## 2019-11-13 RX ADMIN — Medication 500 MILLILITER(S): at 11:47

## 2019-11-13 RX ADMIN — OXYCODONE AND ACETAMINOPHEN 2 TABLET(S): 5; 325 TABLET ORAL at 16:30

## 2019-11-13 RX ADMIN — Medication 25 MILLIGRAM(S): at 23:17

## 2019-11-13 RX ADMIN — PANTOPRAZOLE SODIUM 40 MILLIGRAM(S): 20 TABLET, DELAYED RELEASE ORAL at 06:47

## 2019-11-13 RX ADMIN — ENOXAPARIN SODIUM 30 MILLIGRAM(S): 100 INJECTION SUBCUTANEOUS at 08:27

## 2019-11-13 RX ADMIN — Medication 2: at 16:57

## 2019-11-13 RX ADMIN — Medication 12.5 MILLIGRAM(S): at 06:47

## 2019-11-13 RX ADMIN — ENOXAPARIN SODIUM 30 MILLIGRAM(S): 100 INJECTION SUBCUTANEOUS at 17:05

## 2019-11-13 RX ADMIN — Medication 100 MILLIGRAM(S): at 08:22

## 2019-11-13 RX ADMIN — OXYCODONE AND ACETAMINOPHEN 1 TABLET(S): 5; 325 TABLET ORAL at 11:45

## 2019-11-13 RX ADMIN — SODIUM CHLORIDE 500 MILLILITER(S): 9 INJECTION INTRAMUSCULAR; INTRAVENOUS; SUBCUTANEOUS at 18:45

## 2019-11-13 NOTE — PHYSICAL THERAPY INITIAL EVALUATION ADULT - ADDITIONAL COMMENTS
Pt lives in an apartment, no stairs to enter; 2 sets of 7 steps of stairs to the apartment, bilat handrails. Pt's daughter (an RN) states that she has been asking pt to stay with in her house upon hospital d/c as she only has 3 steps to enter & bedroom is on the first floor.

## 2019-11-13 NOTE — PHYSICAL THERAPY INITIAL EVALUATION ADULT - GENERAL OBSERVATIONS, REHAB EVAL
Received seated on chair, +midsternal surgical incision, +R IJ, +ext pacer, +O2 via NC, +A-line, +chest tube, +metz, +CTU monitoring; reports 3-5/10 incisional pain but was motivated to participate with PT

## 2019-11-13 NOTE — PROGRESS NOTE ADULT - ASSESSMENT
64 yo female, PMH HTN, HLD, COPD, DM2, + Cologuard test, going through cardiac clearance for colonoscopy, Echocardiogram revealed an aortic root 5.6 cm ascending aorta 5.8 cm. Pt. referred to CT surgery.  11/12: Aortic Root Replacement with Ascending Aorta Replacement/Aortic Valve Repair with Dr. Morin. Received intra-op products. Recovered in the ICU. Extubated at 2300.   11/13: UOP decreased, given Albumin, stable BP this PM at 110/59. Transferred to SDU in stable condition.   Binder in place with R pleural CT, 2 med CT, Jeannie, PW 50/10. Resting comfortably in the chair. Started on low dose BB-->Lopressor 12.5mg Q8H and tolerated well thus far. Will cont to monitor urine output, BP, and blood sugars.   Discharge planning-home with PT. 62 yo female, PMH HTN, HLD, COPD, DM2, + Cologuard test, going through cardiac clearance for colonoscopy, Echocardiogram revealed an aortic root 5.6 cm ascending aorta 5.8 cm. Pt. referred to CT surgery.  11/12: Aortic Root Replacement with Ascending Aorta Replacement/Aortic Valve Repair with Dr. Morin. Received intra-op products. Recovered in the ICU. Extubated at 2300.   11/13: UOP decreased, given Albumin, stable BP this PM at 110/59. Transferred to SDU in stable condition.   Binder in place with R pleural CT, 2 med CT, Jeannie, PW 50/10. Resting comfortably in the chair. Started on low dose BB-->Lopressor 12.5mg Q8H and tolerated well thus far. Will cont to monitor urine output, BP, and blood sugars.   Discharge planning-home with PT.

## 2019-11-13 NOTE — PROGRESS NOTE ADULT - SUBJECTIVE AND OBJECTIVE BOX
Subjective: Pt states "I feel OK." Denies any CP, SOB, N/V and palpitations. No acute events overnight.     VITAL SIGNS  Telemetry:  SR 100s   Vital Signs Last 24 Hrs  T(C): 36.7 (19 @ 12:04), Max: 37.9 (19 @ 08:00)  T(F): 98 (19 @ 12:04), Max: 100.2 (19 @ 08:00)  HR: 101 (19 @ 12:04) (81 - 110)  BP: 110/59 (19 @ 12:04) (110/59 - 110/59)  RR: 18 (19 @ 12:04) (10 - 37)  SpO2: 96% (19 @ 12:04) (92% - 100%)             @ 07:01  -   @ 07:00  --------------------------------------------------------  IN: 3044.2 mL / OUT: 3260 mL / NET: -215.8 mL     07:01  -   @ 13:10  --------------------------------------------------------  IN: 810 mL / OUT: 315 mL / NET: 495 mL    Daily Weight in k.5 (2019 00:00)    Bilirubin Total, Serum: 0.5 mg/dL ( 00:52)  Bilirubin Total, Serum: 1.0 mg/dL ( @ 15:37)    CAPILLARY BLOOD GLUCOSE  POCT Blood Glucose.: 142 mg/dL (2019 11:46)  POCT Blood Glucose.: 160 mg/dL (2019 08:09)  POCT Blood Glucose.: 98 mg/dL (2019 04:42)  POCT Blood Glucose.: 119 mg/dL (2019 22:08)  POCT Blood Glucose.: 167 mg/dL (2019 18:12)  POCT Blood Glucose.: 199 mg/dL (2019 16:32)        MEDICATIONS  aspirin enteric coated 81 milliGRAM(s) Oral daily  atorvastatin 20 milliGRAM(s) Oral at bedtime  cefuroxime  IVPB 1500 milliGRAM(s) IV Intermittent every 8 hours  chlorhexidine 2% Cloths 1 Application(s) Topical <User Schedule>  dextrose 50% Injectable 50 milliLiter(s) IV Push every 15 minutes  dextrose 50% Injectable 25 milliLiter(s) IV Push every 15 minutes  enoxaparin Injectable 30 milliGRAM(s) SubCutaneous two times a day  insulin lispro (HumaLOG) corrective regimen sliding scale   SubCutaneous Before meals and at bedtime  metoprolol tartrate 12.5 milliGRAM(s) Oral every 8 hours  oxycodone    5 mG/acetaminophen 325 mG 1 Tablet(s) Oral every 6 hours PRN  oxycodone    5 mG/acetaminophen 325 mG 2 Tablet(s) Oral every 6 hours PRN  pantoprazole    Tablet 40 milliGRAM(s) Oral before breakfast  polyethylene glycol 3350 17 Gram(s) Oral daily  potassium chloride  10 mEq/50 mL IVPB 10 milliEquivalent(s) IV Intermittent every 1 hour  potassium chloride  10 mEq/50 mL IVPB 10 milliEquivalent(s) IV Intermittent every 1 hour  potassium chloride  10 mEq/50 mL IVPB 10 milliEquivalent(s) IV Intermittent every 1 hour  sodium chloride 0.9%. 1000 milliLiter(s) IV Continuous <Continuous>    PHYSICAL EXAM  Neurology: A&Ox3, nonfocal, no gross deficits  CV : RRR, +S1S2, R IJ with dressing  PW: VVI 50/10  CT: 2 mediastinals, 1 R pleural   Sternal Wound : Midsternal dressing in place-CDI , Stable sternum with binder in place  Lungs: Respirations non-labored, mildly diminished in the bases  Abdomen: Soft, NT/ND, +BSx4Q, last BM on pre-op, (+) flatulence, (-)N/V/D  : Dennis in place with clear urine   Extremities: 1+ B/L LE edema, negative calf tenderness, +PP     LABS  11-13    145  |  110<H>  |  8   ----------------------------<  123<H>  4.5   |  25  |  0.41<L>    Ca    8.1<L>      2019 00:52  Phos  3.0       Mg     2.8         TPro  6.1  /  Alb  4.2  /  TBili  0.5  /  DBili  x   /  AST  50<H>  /  ALT  35  /  AlkPhos  44                                   9.7    11.79 )-----------( 210      ( 2019 00:52 )             28.8          PT/INR - ( 2019 00:52 )   PT: 14.5 sec;   INR: 1.25 ratio    PTT - ( 2019 00:52 )  PTT:32.7 sec     PAST MEDICAL & SURGICAL HISTORY:  COPD, mild: Singulair at night, no nebulizers  H/O aortic aneurysm: Dx 10/2019  DM2 (diabetes mellitus, type 2)  Hypertension  Lung nodule: Dx , left 3mm  Former smoker: Quit 10/2019  Renal cyst  Obesity  Uterine polyp  Arthritis  High cholesterol  History of D&C: 15 and  for thickened endometrium  S/P foot surgery: bunionectomy right foot   left foot   Dental disorder: teeth removal   S/P cholecystectomy:      Physical Therapy Rec:   Home  [  ]   Home w/ PT  [ X ]  Rehab  [  ]    Discussed with CT Surgery attending.

## 2019-11-13 NOTE — PHYSICAL THERAPY INITIAL EVALUATION ADULT - PERTINENT HX OF CURRENT PROBLEM, REHAB EVAL
63F PMH HTN, HLD, COPD, DM2, + Cologuard test, going through cardiac clearance for colonoscopy, Echo revealed aortic root 5.6 cm ascending aorta 5.8 cm. Pt. reports feeling well since actively trying to lose weight and has lost 30 lbs since August, occasional PIEDRA and swelling of LE. Denies back pain, chest pain, palpitations recent fever, chills, changes in bowel/urinary habits. S/P above surgery.

## 2019-11-13 NOTE — PHYSICAL THERAPY INITIAL EVALUATION ADULT - PLANNED THERAPY INTERVENTIONS, PT EVAL
gait training/stairs: GOAL: Pt will negotiate 1 flight of stairs with or without appropriate assistive device and handrail via reciprocal/step-to technique indep in 2 weeks./transfer training/balance training/bed mobility training

## 2019-11-13 NOTE — PROGRESS NOTE ADULT - PROBLEM SELECTOR PLAN 2
ASA 81, Lopressor 12.5 Q8H, Ator 20, LVX 30 BID   Pulmonary toilet, incentive spirometry   Pain management   Ambulation and working with PT   Monitoring urine output   Monitoring BS   Discharge planning-home with PT

## 2019-11-13 NOTE — PROGRESS NOTE ADULT - SUBJECTIVE AND OBJECTIVE BOX
CRITICAL CARE ATTENDING - CTICU    MEDICATIONS  (STANDING):  aspirin enteric coated 81 milliGRAM(s) Oral daily  cefuroxime  IVPB 1500 milliGRAM(s) IV Intermittent every 8 hours  chlorhexidine 0.12% Liquid 15 milliLiter(s) Oral Mucosa every 12 hours  chlorhexidine 2% Cloths 1 Application(s) Topical <User Schedule>  dextrose 50% Injectable 50 milliLiter(s) IV Push every 15 minutes  dextrose 50% Injectable 25 milliLiter(s) IV Push every 15 minutes  insulin lispro (HumaLOG) corrective regimen sliding scale   SubCutaneous Before meals and at bedtime  pantoprazole  Injectable 40 milliGRAM(s) IV Push daily  polyethylene glycol 3350 17 Gram(s) Oral daily  potassium chloride  10 mEq/50 mL IVPB 10 milliEquivalent(s) IV Intermittent every 1 hour  potassium chloride  10 mEq/50 mL IVPB 10 milliEquivalent(s) IV Intermittent every 1 hour  potassium chloride  10 mEq/50 mL IVPB 10 milliEquivalent(s) IV Intermittent every 1 hour  potassium chloride  10 mEq/50 mL IVPB 10 milliEquivalent(s) IV Intermittent every 1 hour  sodium chloride 0.9%. 1000 milliLiter(s) (10 mL/Hr) IV Continuous <Continuous>                                    9.7    11.79 )-----------( 210      ( 2019 00:52 )             28.8           145  |  110<H>  |  8   ----------------------------<  123<H>  4.5   |  25  |  0.41<L>    Ca    8.1<L>      2019 00:52  Phos  3.0       Mg     2.8         TPro  6.1  /  Alb  4.2  /  TBili  0.5  /  DBili  x   /  AST  50<H>  /  ALT  35  /  AlkPhos  44  11-      PT/INR - ( 2019 00:52 )   PT: 14.5 sec;   INR: 1.25 ratio         PTT - ( 2019 00:52 )  PTT:32.7 sec    Mode: CPAP with PS  FiO2: 40  PEEP: 5  PS: 5      Daily Height in cm: 160.02 (2019 08:36)    Daily Weight in k.5 (2019 00:00)      11-12 @ 07:01  -  11-13 @ 06:17  --------------------------------------------------------  IN: 2984.2 mL / OUT: 3210 mL / NET: -225.8 mL        Critically Ill patient  : [ ] preoperative ,   [x ] post operative    Requires :  [x ] Arterial Line   [x ] Central Line  [ ] PA catheter  [ ] IABP  [ ] ECMO  [ ] LVAD  [ x] Ventilator  [ x] pacemaker [ ] Impella.                      [x ] ABG's     [x ] Pulse Oxymetry Monitoring  Bedside evaluation , monitoring , treatment of hemodynamics , fluids , IVP/ IVCD meds.        Diagnosis:     Op Day - Asc. Aortic replacement  / hemiArch replacement / Bental     Hypertension     Hemodynamic lability ,instability. Requires IVCD [ ] vasopressors [ ] inotropes  [ x] vasodilator  [ ]IVSS fluid  to maintain MAP, perfusion, C.I.     Temporary pacemaker (TPM) interrogation and setting.     Chest Tube Drainage     Ventilator Management:  [ x]AC-rest    [x ]CPAP-PS Wean this PM     [ ]Trach Collar     [ ]Extubate    [ ] T-Piece  [ ]peep>5     Requires chest PT, pulmonary toilet, ambu bagging, suctioning to maintain SaO2,  patent airway and treat atelectasis.     Obesity OHS / FANG     may require Extubation to BIPAP     ECG     IVCD Insulin                   Discussed with CT surgeon, Physician's Assistant - Nurse Practitioner- Critical care medicine team.   Dicussed at  AM / PM rounds.   Chart, labs , films reviewed.    Total Time: CRITICAL CARE ATTENDING - CTICU    MEDICATIONS  (STANDING):  aspirin enteric coated 81 milliGRAM(s) Oral daily  cefuroxime  IVPB 1500 milliGRAM(s) IV Intermittent every 8 hours  chlorhexidine 0.12% Liquid 15 milliLiter(s) Oral Mucosa every 12 hours  chlorhexidine 2% Cloths 1 Application(s) Topical <User Schedule>  dextrose 50% Injectable 50 milliLiter(s) IV Push every 15 minutes  dextrose 50% Injectable 25 milliLiter(s) IV Push every 15 minutes  insulin lispro (HumaLOG) corrective regimen sliding scale   SubCutaneous Before meals and at bedtime  pantoprazole  Injectable 40 milliGRAM(s) IV Push daily  polyethylene glycol 3350 17 Gram(s) Oral daily  potassium chloride  10 mEq/50 mL IVPB 10 milliEquivalent(s) IV Intermittent every 1 hour  potassium chloride  10 mEq/50 mL IVPB 10 milliEquivalent(s) IV Intermittent every 1 hour  potassium chloride  10 mEq/50 mL IVPB 10 milliEquivalent(s) IV Intermittent every 1 hour  potassium chloride  10 mEq/50 mL IVPB 10 milliEquivalent(s) IV Intermittent every 1 hour  sodium chloride 0.9%. 1000 milliLiter(s) (10 mL/Hr) IV Continuous <Continuous>                                    9.7    11.79 )-----------( 210      ( 2019 00:52 )             28.8           145  |  110<H>  |  8   ----------------------------<  123<H>  4.5   |  25  |  0.41<L>    Ca    8.1<L>      2019 00:52  Phos  3.0       Mg     2.8         TPro  6.1  /  Alb  4.2  /  TBili  0.5  /  DBili  x   /  AST  50<H>  /  ALT  35  /  AlkPhos  44  11-      PT/INR - ( 2019 00:52 )   PT: 14.5 sec;   INR: 1.25 ratio         PTT - ( 2019 00:52 )  PTT:32.7 sec    Mode: CPAP with PS  FiO2: 40  PEEP: 5  PS: 5      Daily Height in cm: 160.02 (2019 08:36)    Daily Weight in k.5 (2019 00:00)      11-12 @ 07:01  -  11-13 @ 06:17  --------------------------------------------------------  IN: 2984.2 mL / OUT: 3210 mL / NET: -225.8 mL        Critically Ill patient  : [ ] preoperative ,   [x ] post operative    Requires :  [x ] Arterial Line   [x ] Central Line  [ ] PA catheter  [ ] IABP  [ ] ECMO  [ ] LVAD  [ x] Ventilator  [ x] pacemaker [ ] Impella.                      [x ] ABG's     [x ] Pulse Oxymetry Monitoring  Bedside evaluation , monitoring , treatment of hemodynamics , fluids , IVP/ IVCD meds.        Diagnosis:     POD 1 - Asc. Aortic replacement  / hemiArch replacement / Bental     Hypertension     Hemodynamic lability ,instability. Requires IVCD [ ] vasopressors [ ] inotropes  [ x] vasodilator  [ ]IVSS fluid  to maintain MAP, perfusion, C.I.     Temporary pacemaker (TPM) interrogation and setting.     Chest Tube Drainage     Ventilator Management:  [ x]AC-rest    [x ]CPAP-PS Wean this PM     [ ]Trach Collar     [ ]Extubate    [ ] T-Piece  [ ]peep>5     Requires chest PT, pulmonary toilet, ambu bagging, suctioning to maintain SaO2,  patent airway and treat atelectasis.     Obesity OHS / FANG     may require Extubation to BIPAP     ECG     IVCD Insulin                   Discussed with CT surgeon, Physician's Assistant - Nurse Practitioner- Critical care medicine team.   Dicussed at  AM / PM rounds.   Chart, labs , films reviewed.    Total Time: CRITICAL CARE ATTENDING - CTICU    MEDICATIONS  (STANDING):  aspirin enteric coated 81 milliGRAM(s) Oral daily  cefuroxime  IVPB 1500 milliGRAM(s) IV Intermittent every 8 hours  chlorhexidine 0.12% Liquid 15 milliLiter(s) Oral Mucosa every 12 hours  chlorhexidine 2% Cloths 1 Application(s) Topical <User Schedule>  dextrose 50% Injectable 50 milliLiter(s) IV Push every 15 minutes  dextrose 50% Injectable 25 milliLiter(s) IV Push every 15 minutes  insulin lispro (HumaLOG) corrective regimen sliding scale   SubCutaneous Before meals and at bedtime  pantoprazole  Injectable 40 milliGRAM(s) IV Push daily  polyethylene glycol 3350 17 Gram(s) Oral daily  potassium chloride  10 mEq/50 mL IVPB 10 milliEquivalent(s) IV Intermittent every 1 hour  potassium chloride  10 mEq/50 mL IVPB 10 milliEquivalent(s) IV Intermittent every 1 hour  potassium chloride  10 mEq/50 mL IVPB 10 milliEquivalent(s) IV Intermittent every 1 hour  potassium chloride  10 mEq/50 mL IVPB 10 milliEquivalent(s) IV Intermittent every 1 hour  sodium chloride 0.9%. 1000 milliLiter(s) (10 mL/Hr) IV Continuous <Continuous>                                    9.7    11.79 )-----------( 210      ( 2019 00:52 )             28.8           145  |  110<H>  |  8   ----------------------------<  123<H>  4.5   |  25  |  0.41<L>    Ca    8.1<L>      2019 00:52  Phos  3.0       Mg     2.8         TPro  6.1  /  Alb  4.2  /  TBili  0.5  /  DBili  x   /  AST  50<H>  /  ALT  35  /  AlkPhos  44  11-      PT/INR - ( 2019 00:52 )   PT: 14.5 sec;   INR: 1.25 ratio         PTT - ( 2019 00:52 )  PTT:32.7 sec    Mode: CPAP with PS  FiO2: 40  PEEP: 5  PS: 5      Daily Height in cm: 160.02 (2019 08:36)    Daily Weight in k.5 (2019 00:00)      11-12 @ 07:01  -  11-13 @ 06:17  --------------------------------------------------------  IN: 2984.2 mL / OUT: 3210 mL / NET: -225.8 mL        Critically Ill patient  : [ ] preoperative ,   [x ] post operative    Requires :  [x ] Arterial Line   [x ] Central Line  [ ] PA catheter  [ ] IABP  [ ] ECMO  [ ] LVAD  [ ] Ventilator  [ x] pacemaker [ ] Impella.                      [x ] ABG's     [x ] Pulse Oxymetry Monitoring  Bedside evaluation , monitoring , treatment of hemodynamics , fluids , IVP/ IVCD meds.        Diagnosis:     POD 1 - Asc. Aortic replacement  / hemiArch replacement / Bental     Tachycardia    Hemodynamic lability ,instability. Requires IVCD [ ] vasopressors [ ] inotropes  [ x] vasodilator  [ ]IVSS fluid  to maintain MAP, perfusion, C.I.     Temporary pacemaker (TPM) interrogation and setting.     Chest Tube Drainage     Ventilator Management:  [ x]AC-rest    [x ]CPAP-PS Wean this PM     [ ]Trach Collar     [ ]Extubate    [ ] T-Piece  [ ]peep>5     Requires chest PT, pulmonary toilet, ambu bagging, suctioning to maintain SaO2,  patent airway and treat atelectasis.     Obesity OHS / FANG     ECG                   Discussed with CT surgeon, Physician's Assistant - Nurse Practitioner- Critical care medicine team.   Dicussed at  AM / PM rounds.   Chart, labs , films reviewed.    Total Time: CRITICAL CARE ATTENDING - CTICU    MEDICATIONS  (STANDING):  aspirin enteric coated 81 milliGRAM(s) Oral daily  cefuroxime  IVPB 1500 milliGRAM(s) IV Intermittent every 8 hours  chlorhexidine 0.12% Liquid 15 milliLiter(s) Oral Mucosa every 12 hours  chlorhexidine 2% Cloths 1 Application(s) Topical <User Schedule>  dextrose 50% Injectable 50 milliLiter(s) IV Push every 15 minutes  dextrose 50% Injectable 25 milliLiter(s) IV Push every 15 minutes  insulin lispro (HumaLOG) corrective regimen sliding scale   SubCutaneous Before meals and at bedtime  pantoprazole  Injectable 40 milliGRAM(s) IV Push daily  polyethylene glycol 3350 17 Gram(s) Oral daily  potassium chloride  10 mEq/50 mL IVPB 10 milliEquivalent(s) IV Intermittent every 1 hour  potassium chloride  10 mEq/50 mL IVPB 10 milliEquivalent(s) IV Intermittent every 1 hour  potassium chloride  10 mEq/50 mL IVPB 10 milliEquivalent(s) IV Intermittent every 1 hour  potassium chloride  10 mEq/50 mL IVPB 10 milliEquivalent(s) IV Intermittent every 1 hour  sodium chloride 0.9%. 1000 milliLiter(s) (10 mL/Hr) IV Continuous <Continuous>                                    9.7    11.79 )-----------( 210      ( 2019 00:52 )             28.8           145  |  110<H>  |  8   ----------------------------<  123<H>  4.5   |  25  |  0.41<L>    Ca    8.1<L>      2019 00:52  Phos  3.0       Mg     2.8         TPro  6.1  /  Alb  4.2  /  TBili  0.5  /  DBili  x   /  AST  50<H>  /  ALT  35  /  AlkPhos  44  11-      PT/INR - ( 2019 00:52 )   PT: 14.5 sec;   INR: 1.25 ratio         PTT - ( 2019 00:52 )  PTT:32.7 sec    Mode: CPAP with PS  FiO2: 40  PEEP: 5  PS: 5      Daily Height in cm: 160.02 (2019 08:36)    Daily Weight in k.5 (2019 00:00)      11-12 @ 07:01  -  11-13 @ 06:17  --------------------------------------------------------  IN: 2984.2 mL / OUT: 3210 mL / NET: -225.8 mL        Critically Ill patient  : [ ] preoperative ,   [x ] post operative    Requires :  [x ] Arterial Line   [x ] Central Line  [ ] PA catheter  [ ] IABP  [ ] ECMO  [ ] LVAD  [ ] Ventilator  [ x] pacemaker [ ] Impella.                      [x ] ABG's     [x ] Pulse Oxymetry Monitoring  Bedside evaluation , monitoring , treatment of hemodynamics , fluids , IVP/ IVCD meds.        Diagnosis:     POD 1 - Asc. Aortic replacement  / hemiArch replacement / Bental     Tachycardia    Hemodynamic lability ,instability. Requires IVCD [ x] vasopressors - on/off  [ ] inotropes  [ x] vasodilator  [ x]IVSS fluid  to maintain MAP, perfusion, C.I.     Hypovolemia    Oliguria     Temporary pacemaker (TPM) interrogation and setting.     Chest Tube Drainage     Requires chest PT, pulmonary toilet,  suctioning to maintain SaO2,  patent airway and treat atelectasis.     Obesity OHS / FANG     ECG     DM - IVCD Insulin converted to Humalog regimen                  Discussed with CT surgeon, Physician's Assistant - Nurse Practitioner- Critical care medicine team.   Dicussed at  AM / PM rounds.   Chart, labs , films reviewed.    Total Time: 30 min

## 2019-11-14 LAB
ANION GAP SERPL CALC-SCNC: 7 MMOL/L — SIGNIFICANT CHANGE UP (ref 5–17)
BUN SERPL-MCNC: 12 MG/DL — SIGNIFICANT CHANGE UP (ref 7–23)
CALCIUM SERPL-MCNC: 8.4 MG/DL — SIGNIFICANT CHANGE UP (ref 8.4–10.5)
CHLORIDE SERPL-SCNC: 100 MMOL/L — SIGNIFICANT CHANGE UP (ref 96–108)
CO2 SERPL-SCNC: 25 MMOL/L — SIGNIFICANT CHANGE UP (ref 22–31)
CREAT SERPL-MCNC: 0.43 MG/DL — LOW (ref 0.5–1.3)
GLUCOSE SERPL-MCNC: 153 MG/DL — HIGH (ref 70–99)
HCT VFR BLD CALC: 27.2 % — LOW (ref 34.5–45)
HGB BLD-MCNC: 9 G/DL — LOW (ref 11.5–15.5)
MCHC RBC-ENTMCNC: 29.7 PG — SIGNIFICANT CHANGE UP (ref 27–34)
MCHC RBC-ENTMCNC: 33.1 GM/DL — SIGNIFICANT CHANGE UP (ref 32–36)
MCV RBC AUTO: 89.8 FL — SIGNIFICANT CHANGE UP (ref 80–100)
NRBC # BLD: 0 /100 WBCS — SIGNIFICANT CHANGE UP (ref 0–0)
PLATELET # BLD AUTO: 169 K/UL — SIGNIFICANT CHANGE UP (ref 150–400)
POTASSIUM SERPL-MCNC: 4.6 MMOL/L — SIGNIFICANT CHANGE UP (ref 3.5–5.3)
POTASSIUM SERPL-SCNC: 4.6 MMOL/L — SIGNIFICANT CHANGE UP (ref 3.5–5.3)
RBC # BLD: 3.03 M/UL — LOW (ref 3.8–5.2)
RBC # FLD: 15.9 % — HIGH (ref 10.3–14.5)
SODIUM SERPL-SCNC: 132 MMOL/L — LOW (ref 135–145)
WBC # BLD: 15.55 K/UL — HIGH (ref 3.8–10.5)
WBC # FLD AUTO: 15.55 K/UL — HIGH (ref 3.8–10.5)

## 2019-11-14 PROCEDURE — 71045 X-RAY EXAM CHEST 1 VIEW: CPT | Mod: 26

## 2019-11-14 RX ORDER — METOPROLOL TARTRATE 50 MG
50 TABLET ORAL
Refills: 0 | Status: DISCONTINUED | OUTPATIENT
Start: 2019-11-14 | End: 2019-11-14

## 2019-11-14 RX ORDER — METOPROLOL TARTRATE 50 MG
50 TABLET ORAL EVERY 8 HOURS
Refills: 0 | Status: DISCONTINUED | OUTPATIENT
Start: 2019-11-14 | End: 2019-11-15

## 2019-11-14 RX ORDER — FUROSEMIDE 40 MG
40 TABLET ORAL DAILY
Refills: 0 | Status: DISCONTINUED | OUTPATIENT
Start: 2019-11-14 | End: 2019-11-18

## 2019-11-14 RX ORDER — SPIRONOLACTONE 25 MG/1
50 TABLET, FILM COATED ORAL DAILY
Refills: 0 | Status: DISCONTINUED | OUTPATIENT
Start: 2019-11-14 | End: 2019-11-18

## 2019-11-14 RX ORDER — METOPROLOL TARTRATE 50 MG
25 TABLET ORAL ONCE
Refills: 0 | Status: COMPLETED | OUTPATIENT
Start: 2019-11-14 | End: 2019-11-14

## 2019-11-14 RX ADMIN — POLYETHYLENE GLYCOL 3350 17 GRAM(S): 17 POWDER, FOR SOLUTION ORAL at 12:21

## 2019-11-14 RX ADMIN — Medication 2: at 17:40

## 2019-11-14 RX ADMIN — ENOXAPARIN SODIUM 30 MILLIGRAM(S): 100 INJECTION SUBCUTANEOUS at 17:41

## 2019-11-14 RX ADMIN — Medication 100 MILLIGRAM(S): at 00:51

## 2019-11-14 RX ADMIN — Medication 50 MILLIGRAM(S): at 13:55

## 2019-11-14 RX ADMIN — Medication 25 MILLIGRAM(S): at 05:27

## 2019-11-14 RX ADMIN — OXYCODONE AND ACETAMINOPHEN 1 TABLET(S): 5; 325 TABLET ORAL at 16:43

## 2019-11-14 RX ADMIN — ATORVASTATIN CALCIUM 20 MILLIGRAM(S): 80 TABLET, FILM COATED ORAL at 21:48

## 2019-11-14 RX ADMIN — Medication 50 MILLIGRAM(S): at 21:48

## 2019-11-14 RX ADMIN — ENOXAPARIN SODIUM 30 MILLIGRAM(S): 100 INJECTION SUBCUTANEOUS at 05:26

## 2019-11-14 RX ADMIN — SPIRONOLACTONE 50 MILLIGRAM(S): 25 TABLET, FILM COATED ORAL at 09:18

## 2019-11-14 RX ADMIN — CHLORHEXIDINE GLUCONATE 1 APPLICATION(S): 213 SOLUTION TOPICAL at 07:47

## 2019-11-14 RX ADMIN — OXYCODONE AND ACETAMINOPHEN 2 TABLET(S): 5; 325 TABLET ORAL at 03:40

## 2019-11-14 RX ADMIN — OXYCODONE AND ACETAMINOPHEN 2 TABLET(S): 5; 325 TABLET ORAL at 02:49

## 2019-11-14 RX ADMIN — Medication 25 MILLIGRAM(S): at 07:47

## 2019-11-14 RX ADMIN — OXYCODONE AND ACETAMINOPHEN 1 TABLET(S): 5; 325 TABLET ORAL at 17:15

## 2019-11-14 RX ADMIN — Medication 2: at 12:14

## 2019-11-14 RX ADMIN — Medication 40 MILLIGRAM(S): at 08:29

## 2019-11-14 RX ADMIN — PANTOPRAZOLE SODIUM 40 MILLIGRAM(S): 20 TABLET, DELAYED RELEASE ORAL at 05:27

## 2019-11-14 RX ADMIN — OXYCODONE AND ACETAMINOPHEN 2 TABLET(S): 5; 325 TABLET ORAL at 09:37

## 2019-11-14 RX ADMIN — OXYCODONE AND ACETAMINOPHEN 2 TABLET(S): 5; 325 TABLET ORAL at 10:15

## 2019-11-14 RX ADMIN — Medication 81 MILLIGRAM(S): at 12:21

## 2019-11-14 NOTE — PROGRESS NOTE ADULT - ASSESSMENT
62 yo female, PMH HTN, HLD, COPD, DM2, + Cologuard test, going through cardiac clearance for colonoscopy, Echocardiogram revealed an aortic root 5.6 cm ascending aorta 5.8 cm. Pt. referred to CT surgery.  11/12: Aortic Root Replacement with Ascending Aorta Replacement/Aortic Valve Repair with Dr. Morin. Received intra-op products. Recovered in the ICU. Extubated at 2300.   11/13: UOP decreased, given Albumin, stable BP this PM at 110/59. Transferred to SDU in stable condition.   Binder in place with R pleural CT, 2 med CT, Jeannie, PW 50/10. Resting comfortably in the chair. Started on low dose BB-->Lopressor 12.5mg Q8H and tolerated well thus far. Will cont to monitor urine output, BP, and blood sugars.   Discharge planning-home with PT. 64 yo female, PMH HTN, HLD, COPD, DM2, + Cologuard test, going through cardiac clearance for colonoscopy, Echocardiogram revealed an aortic root 5.6 cm ascending aorta 5.8 cm. Pt. referred to CT surgery.  11/12: Aortic Root Replacement with Ascending Aorta Replacement/Aortic Valve Repair with Dr. Morin. Received intra-op products. Recovered in the ICU. Extubated at 2300.   11/13: UOP decreased, given Albumin, stable BP this PM at 110/59. Transferred to SDU in stable condition.   Binder in place with R pleural CT, 2 med CT, Jeannie, PW 50/10. Resting comfortably in the chair. Started on low dose BB-->Lopressor 12.5mg Q8H and tolerated well thus far. Will cont to monitor urine output, BP, and blood sugars.   11/14 VSS - 's lopressor increased to 50mg po q8h, lasix 40mg po daily & aldactone 50mg po daily initiated .  MS CT & L CT d/c'd - cxr ordered. O2 4lnc   Discharge planning-home with PT.

## 2019-11-14 NOTE — PROVIDER CONTACT NOTE (OTHER) - BACKGROUND
cardiac clearance for colonoscopy.. echo ->  11/12 aortic root replacement with ascending aorta replacement, AVR

## 2019-11-14 NOTE — PROGRESS NOTE ADULT - SUBJECTIVE AND OBJECTIVE BOX
VITAL SIGNS-Telemetry:  SR 98  Vital Signs Last 24 Hrs  T(C): 36.7 (11-14-19 @ 07:13), Max: 36.8 (11-14-19 @ 03:00)  T(F): 98.1 (11-14-19 @ 07:13), Max: 98.3 (11-14-19 @ 03:00)  HR: 95 (11-14-19 @ 07:13) (95 - 107)  BP: 122/58 (11-14-19 @ 07:13) (98/57 - 123/64)  RR: 18 (11-14-19 @ 07:13) (18 - 26)  SpO2: 94% (11-14-19 @ 07:13) (92% - 96%)         11-13 @ 07:01  -  11-14 @ 07:00  --------------------------------------------------------  IN: 1520 mL / OUT: 1170 mL / NET: 350 mL    11-14 @ 07:01  -  11-14 @ 09:42  --------------------------------------------------------  IN: 0 mL / OUT: 2 mL / NET: -2 mL    Daily     Daily     CAPILLARY BLOOD GLUCOSE  POCT Blood Glucose.: 135 mg/dL (14 Nov 2019 08:09)  POCT Blood Glucose.: 149 mg/dL (13 Nov 2019 21:32)  POCT Blood Glucose.: 159 mg/dL (13 Nov 2019 16:52)  POCT Blood Glucose.: 142 mg/dL (13 Nov 2019 11:46)        Pacing Wires        [  ]   Settings:                                  Isolated  [  x]       PHYSICAL EXAM:  Neurology: alert and oriented x 3, nonfocal, no gross deficits  CV : S1S2  Sternal Wound :  CDI , Stable  Lungs:  Abdomen: soft, nontender, nondistended, positive bowel sounds, last bowel movement         Extremities:         aspirin enteric coated 81 milliGRAM(s) Oral daily  atorvastatin 20 milliGRAM(s) Oral at bedtime  chlorhexidine 2% Cloths 1 Application(s) Topical <User Schedule>  dextrose 50% Injectable 50 milliLiter(s) IV Push every 15 minutes  dextrose 50% Injectable 25 milliLiter(s) IV Push every 15 minutes  enoxaparin Injectable 30 milliGRAM(s) SubCutaneous two times a day  furosemide    Tablet 40 milliGRAM(s) Oral daily  insulin lispro (HumaLOG) corrective regimen sliding scale   SubCutaneous Before meals and at bedtime  metoprolol tartrate 50 milliGRAM(s) Oral every 8 hours  oxycodone    5 mG/acetaminophen 325 mG 1 Tablet(s) Oral every 6 hours PRN  oxycodone    5 mG/acetaminophen 325 mG 2 Tablet(s) Oral every 6 hours PRN  pantoprazole    Tablet 40 milliGRAM(s) Oral before breakfast  polyethylene glycol 3350 17 Gram(s) Oral daily  potassium chloride  10 mEq/50 mL IVPB 10 milliEquivalent(s) IV Intermittent every 1 hour  potassium chloride  10 mEq/50 mL IVPB 10 milliEquivalent(s) IV Intermittent every 1 hour  potassium chloride  10 mEq/50 mL IVPB 10 milliEquivalent(s) IV Intermittent every 1 hour  spironolactone 50 milliGRAM(s) Oral daily      Physical Therapy Rec:   Home  [  ]   Home w/ PT  [ x ]  Rehab  [  ]  Discussed with Cardiothoracic Team at AM rounds. VITAL SIGNS-Telemetry:  SR 98  Vital Signs Last 24 Hrs  T(C): 36.7 (11-14-19 @ 07:13), Max: 36.8 (11-14-19 @ 03:00)  T(F): 98.1 (11-14-19 @ 07:13), Max: 98.3 (11-14-19 @ 03:00)  HR: 95 (11-14-19 @ 07:13) (95 - 107)  BP: 122/58 (11-14-19 @ 07:13) (98/57 - 123/64)  RR: 18 (11-14-19 @ 07:13) (18 - 26)  SpO2: 94% (11-14-19 @ 07:13) (92% - 96%)         11-13 @ 07:01  -  11-14 @ 07:00  --------------------------------------------------------  IN: 1520 mL / OUT: 1170 mL / NET: 350 mL    11-14 @ 07:01  -  11-14 @ 09:42  --------------------------------------------------------  IN: 0 mL / OUT: 2 mL / NET: -2 mL    Daily     Daily     CAPILLARY BLOOD GLUCOSE  POCT Blood Glucose.: 135 mg/dL (14 Nov 2019 08:09)  POCT Blood Glucose.: 149 mg/dL (13 Nov 2019 21:32)  POCT Blood Glucose.: 159 mg/dL (13 Nov 2019 16:52)  POCT Blood Glucose.: 142 mg/dL (13 Nov 2019 11:46)        Pacing Wires        [  ]   Settings:                                  Isolated  [  x]       PHYSICAL EXAM:  Neurology: alert and oriented x 3, nonfocal, no gross deficits  CV : S1S2  Sternal Wound :  CDI , Stable  Lungs: CTA - diminished bs lll  Abdomen: soft, nontender, nondistended, positive bowel sounds, last bowel movement       pre-op  Extremities:     tr. edema, no calf tenderness    aspirin enteric coated 81 milliGRAM(s) Oral daily  atorvastatin 20 milliGRAM(s) Oral at bedtime  chlorhexidine 2% Cloths 1 Application(s) Topical <User Schedule>  dextrose 50% Injectable 50 milliLiter(s) IV Push every 15 minutes  dextrose 50% Injectable 25 milliLiter(s) IV Push every 15 minutes  enoxaparin Injectable 30 milliGRAM(s) SubCutaneous two times a day  furosemide    Tablet 40 milliGRAM(s) Oral daily  insulin lispro (HumaLOG) corrective regimen sliding scale   SubCutaneous Before meals and at bedtime  metoprolol tartrate 50 milliGRAM(s) Oral every 8 hours  oxycodone    5 mG/acetaminophen 325 mG 1 Tablet(s) Oral every 6 hours PRN  oxycodone    5 mG/acetaminophen 325 mG 2 Tablet(s) Oral every 6 hours PRN  pantoprazole    Tablet 40 milliGRAM(s) Oral before breakfast  polyethylene glycol 3350 17 Gram(s) Oral daily  potassium chloride  10 mEq/50 mL IVPB 10 milliEquivalent(s) IV Intermittent every 1 hour  potassium chloride  10 mEq/50 mL IVPB 10 milliEquivalent(s) IV Intermittent every 1 hour  potassium chloride  10 mEq/50 mL IVPB 10 milliEquivalent(s) IV Intermittent every 1 hour  spironolactone 50 milliGRAM(s) Oral daily      Physical Therapy Rec:   Home  [  ]   Home w/ PT  [ x ]  Rehab  [  ]  Discussed with Cardiothoracic Team at AM rounds.

## 2019-11-15 LAB
ANION GAP SERPL CALC-SCNC: 14 MMOL/L — SIGNIFICANT CHANGE UP (ref 5–17)
BUN SERPL-MCNC: 12 MG/DL — SIGNIFICANT CHANGE UP (ref 7–23)
CALCIUM SERPL-MCNC: 8.4 MG/DL — SIGNIFICANT CHANGE UP (ref 8.4–10.5)
CHLORIDE SERPL-SCNC: 97 MMOL/L — SIGNIFICANT CHANGE UP (ref 96–108)
CO2 SERPL-SCNC: 25 MMOL/L — SIGNIFICANT CHANGE UP (ref 22–31)
CREAT SERPL-MCNC: 0.35 MG/DL — LOW (ref 0.5–1.3)
GLUCOSE SERPL-MCNC: 143 MG/DL — HIGH (ref 70–99)
HCT VFR BLD CALC: 27 % — LOW (ref 34.5–45)
HGB BLD-MCNC: 8.9 G/DL — LOW (ref 11.5–15.5)
MCHC RBC-ENTMCNC: 29.3 PG — SIGNIFICANT CHANGE UP (ref 27–34)
MCHC RBC-ENTMCNC: 33 GM/DL — SIGNIFICANT CHANGE UP (ref 32–36)
MCV RBC AUTO: 88.8 FL — SIGNIFICANT CHANGE UP (ref 80–100)
NRBC # BLD: 0 /100 WBCS — SIGNIFICANT CHANGE UP (ref 0–0)
PLATELET # BLD AUTO: 191 K/UL — SIGNIFICANT CHANGE UP (ref 150–400)
POTASSIUM SERPL-MCNC: 4.2 MMOL/L — SIGNIFICANT CHANGE UP (ref 3.5–5.3)
POTASSIUM SERPL-SCNC: 4.2 MMOL/L — SIGNIFICANT CHANGE UP (ref 3.5–5.3)
RBC # BLD: 3.04 M/UL — LOW (ref 3.8–5.2)
RBC # FLD: 15.8 % — HIGH (ref 10.3–14.5)
SODIUM SERPL-SCNC: 136 MMOL/L — SIGNIFICANT CHANGE UP (ref 135–145)
WBC # BLD: 14.11 K/UL — HIGH (ref 3.8–10.5)
WBC # FLD AUTO: 14.11 K/UL — HIGH (ref 3.8–10.5)

## 2019-11-15 PROCEDURE — 71045 X-RAY EXAM CHEST 1 VIEW: CPT | Mod: 26

## 2019-11-15 RX ORDER — ATENOLOL 25 MG/1
75 TABLET ORAL EVERY 12 HOURS
Refills: 0 | Status: DISCONTINUED | OUTPATIENT
Start: 2019-11-15 | End: 2019-11-17

## 2019-11-15 RX ADMIN — ENOXAPARIN SODIUM 30 MILLIGRAM(S): 100 INJECTION SUBCUTANEOUS at 17:32

## 2019-11-15 RX ADMIN — OXYCODONE AND ACETAMINOPHEN 2 TABLET(S): 5; 325 TABLET ORAL at 22:39

## 2019-11-15 RX ADMIN — PANTOPRAZOLE SODIUM 40 MILLIGRAM(S): 20 TABLET, DELAYED RELEASE ORAL at 06:36

## 2019-11-15 RX ADMIN — Medication 81 MILLIGRAM(S): at 11:53

## 2019-11-15 RX ADMIN — ENOXAPARIN SODIUM 30 MILLIGRAM(S): 100 INJECTION SUBCUTANEOUS at 06:35

## 2019-11-15 RX ADMIN — ATORVASTATIN CALCIUM 20 MILLIGRAM(S): 80 TABLET, FILM COATED ORAL at 22:09

## 2019-11-15 RX ADMIN — ATENOLOL 75 MILLIGRAM(S): 25 TABLET ORAL at 11:57

## 2019-11-15 RX ADMIN — SPIRONOLACTONE 50 MILLIGRAM(S): 25 TABLET, FILM COATED ORAL at 06:35

## 2019-11-15 RX ADMIN — Medication 40 MILLIGRAM(S): at 06:35

## 2019-11-15 RX ADMIN — CHLORHEXIDINE GLUCONATE 1 APPLICATION(S): 213 SOLUTION TOPICAL at 05:16

## 2019-11-15 RX ADMIN — OXYCODONE AND ACETAMINOPHEN 2 TABLET(S): 5; 325 TABLET ORAL at 22:09

## 2019-11-15 RX ADMIN — Medication 50 MILLIGRAM(S): at 06:35

## 2019-11-15 NOTE — PROGRESS NOTE ADULT - ASSESSMENT
64 yo female, PMH HTN, HLD, COPD, DM2, + Cologuard test, going through cardiac clearance for colonoscopy, Echocardiogram revealed an aortic root 5.6 cm ascending aorta 5.8 cm. Pt. referred to CT surgery.  11/12: Aortic Root Replacement with Ascending Aorta Replacement/Aortic Valve Repair with Dr. Morin. Received intra-op products. Recovered in the ICU. Extubated at 2300.   11/13: UOP decreased, given Albumin, stable BP this PM at 110/59. Transferred to SDU in stable condition.   Binder in place with R pleural CT, 2 med CT, Jeannie, PW 50/10. Resting comfortably in the chair. Started on low dose BB-->Lopressor 12.5mg Q8H and tolerated well thus far. Will cont to monitor urine output, BP, and blood sugars.   11/14 VSS - 's lopressor increased to 50mg po q8h, lasix 40mg po daily & aldactone 50mg po daily initiated .  MS CT & L CT d/c'd - cxr ordered. O2 4lnc   11/15: VSS. ST in 110s, lopressor changed to Atenolol 75mg BID with stable BP. Remains on Lasix and Aldactone.   Discharge planning-home with PT.

## 2019-11-15 NOTE — PROGRESS NOTE ADULT - SUBJECTIVE AND OBJECTIVE BOX
Subjective: Pt states "I feel good." Denies any CP, SOB, N/V and palpitations. No acute events overnight.     VITAL SIGNS  Telemetry:  NSR 112bpm   Vital Signs Last 24 Hrs  T(C): 37.2 (11-15-19 @ 07:15), Max: 37.3 (19 @ 23:11)  T(F): 99 (11-15-19 @ 07:15), Max: 99.2 (19 @ 23:11)  HR: 102 (11-15-19 @ 07:15) (98 - 109)  BP: 106/74 (11-15-19 @ 07:15) (106/74 - 133/75)  RR: 18 (11-15-19 @ 07:15) (18 - 18)  SpO2: 94% (11-15-19 @ 07:15) (91% - 99%)             @ 07:01  -  11-15 @ 07:00  --------------------------------------------------------  IN: 530 mL / OUT: 1552 mL / NET: -1022 mL    11-15 @ 07:01  -  11-15 @ 11:34  --------------------------------------------------------  IN: 240 mL / OUT: 700 mL / NET: -460 mL      Daily Weight in k.7 (15 Nov 2019 07:30)    CAPILLARY BLOOD GLUCOSE  POCT Blood Glucose.: 137 mg/dL (15 Nov 2019 07:42)  POCT Blood Glucose.: 149 mg/dL (2019 21:25)  POCT Blood Glucose.: 153 mg/dL (2019 17:16)  POCT Blood Glucose.: 173 mg/dL (2019 12:01)        MEDICATIONS  aspirin enteric coated 81 milliGRAM(s) Oral daily  ATENolol  Tablet 75 milliGRAM(s) Oral every 12 hours  atorvastatin 20 milliGRAM(s) Oral at bedtime  chlorhexidine 2% Cloths 1 Application(s) Topical <User Schedule>  dextrose 50% Injectable 50 milliLiter(s) IV Push every 15 minutes  dextrose 50% Injectable 25 milliLiter(s) IV Push every 15 minutes  enoxaparin Injectable 30 milliGRAM(s) SubCutaneous two times a day  furosemide    Tablet 40 milliGRAM(s) Oral daily  insulin lispro (HumaLOG) corrective regimen sliding scale   SubCutaneous Before meals and at bedtime  oxycodone    5 mG/acetaminophen 325 mG 1 Tablet(s) Oral every 6 hours PRN  oxycodone    5 mG/acetaminophen 325 mG 2 Tablet(s) Oral every 6 hours PRN  pantoprazole    Tablet 40 milliGRAM(s) Oral before breakfast  polyethylene glycol 3350 17 Gram(s) Oral daily  potassium chloride  10 mEq/50 mL IVPB 10 milliEquivalent(s) IV Intermittent every 1 hour  potassium chloride  10 mEq/50 mL IVPB 10 milliEquivalent(s) IV Intermittent every 1 hour  potassium chloride  10 mEq/50 mL IVPB 10 milliEquivalent(s) IV Intermittent every 1 hour  spironolactone 50 milliGRAM(s) Oral daily    PHYSICAL EXAM  Neurology: A&Ox3, nonfocal, no gross deficits  CV : tachy, regular rhythm, +S1S2  Sternal Wound :  Midsternal dressing in place-CDI , Stable sternum, binder in place  PW: VVI 40/10   Lungs: Respirations non-labored, mildly diminished in L base, CTA otherwise, on 2L O2 via NC  Abdomen: Soft, NT/ND, +BSx4Q, last BM on 11/15, (-)N/V/D  : Voiding without difficulty, bladder non-distended   Extremities: 1+ B/L LE edema, negative calf tenderness, +PP    LABS  -15    136  |  97  |  12  ----------------------------<  143<H>  4.2   |  25  |  0.35<L>    Ca    8.4      15 Nov 2019 05:45                 8.9    14.11 )-----------( 191      ( 15 Nov 2019 05:45 )             27.0          PAST MEDICAL & SURGICAL HISTORY:  COPD, mild: Singulair at night, no nebulizers  H/O aortic aneurysm: Dx 10/2019  DM2 (diabetes mellitus, type 2)  Hypertension  Lung nodule: Dx , left 3mm  Former smoker: Quit 10/2019  Renal cyst  Obesity  Uterine polyp  Arthritis  High cholesterol  History of D&C: 15 and  for thickened endometrium  S/P foot surgery: bunionectomy right foot   left foot   Dental disorder: teeth removal   S/P cholecystectomy:      Physical Therapy Rec:   Home  [  ]   Home w/ PT  [ X ]  Rehab  [  ]    Discussed with CT Surgery attending.

## 2019-11-15 NOTE — PROGRESS NOTE ADULT - PROBLEM SELECTOR PLAN 1
ASA 81, Atenolol 75 BID, Ator 20, LVX 30 BID   Pulmonary toilet, incentive spirometry   Pain management   Ambulation and working with PT   Monitoring urine output   Monitoring BS   Discharge planning-home with PT

## 2019-11-16 LAB
ANION GAP SERPL CALC-SCNC: 13 MMOL/L — SIGNIFICANT CHANGE UP (ref 5–17)
BUN SERPL-MCNC: 13 MG/DL — SIGNIFICANT CHANGE UP (ref 7–23)
CALCIUM SERPL-MCNC: 8.8 MG/DL — SIGNIFICANT CHANGE UP (ref 8.4–10.5)
CHLORIDE SERPL-SCNC: 90 MMOL/L — LOW (ref 96–108)
CO2 SERPL-SCNC: 29 MMOL/L — SIGNIFICANT CHANGE UP (ref 22–31)
CREAT SERPL-MCNC: 0.45 MG/DL — LOW (ref 0.5–1.3)
GLUCOSE SERPL-MCNC: 151 MG/DL — HIGH (ref 70–99)
HCT VFR BLD CALC: 28.9 % — LOW (ref 34.5–45)
HGB BLD-MCNC: 9.7 G/DL — LOW (ref 11.5–15.5)
MCHC RBC-ENTMCNC: 29.9 PG — SIGNIFICANT CHANGE UP (ref 27–34)
MCHC RBC-ENTMCNC: 33.6 GM/DL — SIGNIFICANT CHANGE UP (ref 32–36)
MCV RBC AUTO: 89.2 FL — SIGNIFICANT CHANGE UP (ref 80–100)
NRBC # BLD: 0 /100 WBCS — SIGNIFICANT CHANGE UP (ref 0–0)
PLATELET # BLD AUTO: 242 K/UL — SIGNIFICANT CHANGE UP (ref 150–400)
POTASSIUM SERPL-MCNC: 3.9 MMOL/L — SIGNIFICANT CHANGE UP (ref 3.5–5.3)
POTASSIUM SERPL-SCNC: 3.9 MMOL/L — SIGNIFICANT CHANGE UP (ref 3.5–5.3)
RBC # BLD: 3.24 M/UL — LOW (ref 3.8–5.2)
RBC # FLD: 15.6 % — HIGH (ref 10.3–14.5)
SODIUM SERPL-SCNC: 132 MMOL/L — LOW (ref 135–145)
WBC # BLD: 12.73 K/UL — HIGH (ref 3.8–10.5)
WBC # FLD AUTO: 12.73 K/UL — HIGH (ref 3.8–10.5)

## 2019-11-16 RX ADMIN — PANTOPRAZOLE SODIUM 40 MILLIGRAM(S): 20 TABLET, DELAYED RELEASE ORAL at 05:29

## 2019-11-16 RX ADMIN — ATENOLOL 75 MILLIGRAM(S): 25 TABLET ORAL at 12:09

## 2019-11-16 RX ADMIN — SPIRONOLACTONE 50 MILLIGRAM(S): 25 TABLET, FILM COATED ORAL at 05:29

## 2019-11-16 RX ADMIN — ENOXAPARIN SODIUM 30 MILLIGRAM(S): 100 INJECTION SUBCUTANEOUS at 17:27

## 2019-11-16 RX ADMIN — OXYCODONE AND ACETAMINOPHEN 2 TABLET(S): 5; 325 TABLET ORAL at 21:43

## 2019-11-16 RX ADMIN — Medication 2: at 08:21

## 2019-11-16 RX ADMIN — ATENOLOL 75 MILLIGRAM(S): 25 TABLET ORAL at 00:14

## 2019-11-16 RX ADMIN — Medication 81 MILLIGRAM(S): at 12:09

## 2019-11-16 RX ADMIN — ENOXAPARIN SODIUM 30 MILLIGRAM(S): 100 INJECTION SUBCUTANEOUS at 05:29

## 2019-11-16 RX ADMIN — ATORVASTATIN CALCIUM 20 MILLIGRAM(S): 80 TABLET, FILM COATED ORAL at 21:43

## 2019-11-16 RX ADMIN — CHLORHEXIDINE GLUCONATE 1 APPLICATION(S): 213 SOLUTION TOPICAL at 11:30

## 2019-11-16 RX ADMIN — Medication 40 MILLIGRAM(S): at 05:29

## 2019-11-16 RX ADMIN — OXYCODONE AND ACETAMINOPHEN 2 TABLET(S): 5; 325 TABLET ORAL at 22:13

## 2019-11-16 RX ADMIN — ATENOLOL 75 MILLIGRAM(S): 25 TABLET ORAL at 23:22

## 2019-11-16 NOTE — PROGRESS NOTE ADULT - ASSESSMENT
62 yo female, PMH HTN, HLD, COPD, DM2, + Cologuard test, going through cardiac clearance for colonoscopy, Echocardiogram revealed an aortic root 5.6 cm ascending aorta 5.8 cm. Pt. referred to CT surgery.  11/12: Aortic Root Replacement with Ascending Aorta Replacement/Aortic Valve Repair with Dr. Morin. Received intra-op products. Recovered in the ICU. Extubated at 2300.   11/13: UOP decreased, given Albumin, stable BP this PM at 110/59. Transferred to SDU in stable condition.   Binder in place with R pleural CT, 2 med CT, Jeannie, PW 50/10. Resting comfortably in the chair. Started on low dose BB-->Lopressor 12.5mg Q8H and tolerated well thus far. Will cont to monitor urine output, BP, and blood sugars.   11/14 VSS - 's lopressor increased to 50mg po q8h, lasix 40mg po daily & aldactone 50mg po daily initiated .  MS CT & L CT d/c'd - cxr ordered. O2 4lnc   11/15: VSS. ST in 110s, lopressor changed to Atenolol 75mg BID with stable BP. Remains on Lasix and Aldactone.   11/16 Continue with diuresis. Remains 5kg > preop weight.  DC pacing wires. Anticipate discharge home Sunday.  Discharge planning-home with PT.

## 2019-11-16 NOTE — PROGRESS NOTE ADULT - SUBJECTIVE AND OBJECTIVE BOX
VITAL SIGNS    Telemetry:  SR    Vital Signs Last 24 Hrs  T(C): 36.7 (19 @ 05:31), Max: 37.2 (11-15-19 @ 11:35)  T(F): 98.1 (19 @ 05:31), Max: 98.9 (11-15-19 @ 11:35)  HR: 83 (19 05:31) (83 - 107)  BP: 105/74 (19 @ 05:31) (102/57 - 119/66)  RR: 20 (19 @ 05:31) (18 - 20)  SpO2: 94% (19 @ 05:31) (91% - 94%)            11-15 @ 07:01  -   @ 07:00  --------------------------------------------------------  IN: 680 mL / OUT: 1850 mL / NET: -1170 mL     @ 07:01  -   @ 10:42  --------------------------------------------------------  IN: 0 mL / OUT: 900 mL / NET: -900 mL       Daily     Daily Weight in k.3 (2019 08:48)  Admit Wt: Drug Dosing Weight  Height (cm): 160.02 (2019 08:36)  Weight (kg): 103.4 (2019 08:36)  BMI (kg/m2): 40.4 (2019 08:36)  BSA (m2): 2.04 (2019 08:36)      CAPILLARY BLOOD GLUCOSE      POCT Blood Glucose.: 157 mg/dL (2019 07:56)  POCT Blood Glucose.: 134 mg/dL (15 Nov 2019 21:36)  POCT Blood Glucose.: 136 mg/dL (15 Nov 2019 17:18)  POCT Blood Glucose.: 150 mg/dL (15 Nov 2019 12:23)          MEDICATIONS  aspirin enteric coated 81 milliGRAM(s) Oral daily  ATENolol  Tablet 75 milliGRAM(s) Oral every 12 hours  atorvastatin 20 milliGRAM(s) Oral at bedtime  chlorhexidine 2% Cloths 1 Application(s) Topical <User Schedule>  dextrose 50% Injectable 50 milliLiter(s) IV Push every 15 minutes  dextrose 50% Injectable 25 milliLiter(s) IV Push every 15 minutes  enoxaparin Injectable 30 milliGRAM(s) SubCutaneous two times a day  furosemide    Tablet 40 milliGRAM(s) Oral daily  insulin lispro (HumaLOG) corrective regimen sliding scale   SubCutaneous Before meals and at bedtime  oxycodone    5 mG/acetaminophen 325 mG 1 Tablet(s) Oral every 6 hours PRN  oxycodone    5 mG/acetaminophen 325 mG 2 Tablet(s) Oral every 6 hours PRN  pantoprazole    Tablet 40 milliGRAM(s) Oral before breakfast  polyethylene glycol 3350 17 Gram(s) Oral daily  potassium chloride  10 mEq/50 mL IVPB 10 milliEquivalent(s) IV Intermittent every 1 hour  potassium chloride  10 mEq/50 mL IVPB 10 milliEquivalent(s) IV Intermittent every 1 hour  potassium chloride  10 mEq/50 mL IVPB 10 milliEquivalent(s) IV Intermittent every 1 hour  spironolactone 50 milliGRAM(s) Oral daily    PHYSICAL EXAM  Subjective:  NAD  Neurology: alert and oriented x 3, nonfocal, no gross deficits  CV : s1s2  Sternal Wound :  CDI , Stable  Lungs: CTA b/l  Abdomen: soft, NT,ND, ( +)BM  :  voiding  Extremities:  -c/c/e     LABS  11-16    132<L>  |  90<L>  |  13  ----------------------------<  151<H>  3.9   |  29  |  0.45<L>    Ca    8.8      2019 07:30                                   9.7    12.73 )-----------( 242      ( 2019 07:36 )             28.9                 PAST MEDICAL & SURGICAL HISTORY:  COPD, mild: Singulair at night, no nebulizers  H/O aortic aneurysm: Dx 10/2019  DM2 (diabetes mellitus, type 2)  Hypertension  Lung nodule: Dx 2016, left 3mm  Former smoker: Quit 10/2019  Renal cyst  Obesity  Uterine polyp  Arthritis  High cholesterol  History of D&C: 17 and 2018 for thickened endometrium  S/P foot surgery: bunionectomy right foot 2013  left foot 2013  Dental disorder: teeth removal   S/P cholecystectomy: 1988

## 2019-11-17 LAB
ALBUMIN SERPL ELPH-MCNC: 3.3 G/DL — SIGNIFICANT CHANGE UP (ref 3.3–5)
ALP SERPL-CCNC: 58 U/L — SIGNIFICANT CHANGE UP (ref 40–120)
ALT FLD-CCNC: 29 U/L — SIGNIFICANT CHANGE UP (ref 10–45)
ANION GAP SERPL CALC-SCNC: 14 MMOL/L — SIGNIFICANT CHANGE UP (ref 5–17)
ANION GAP SERPL CALC-SCNC: 14 MMOL/L — SIGNIFICANT CHANGE UP (ref 5–17)
AST SERPL-CCNC: 22 U/L — SIGNIFICANT CHANGE UP (ref 10–40)
BILIRUB SERPL-MCNC: 0.4 MG/DL — SIGNIFICANT CHANGE UP (ref 0.2–1.2)
BUN SERPL-MCNC: 10 MG/DL — SIGNIFICANT CHANGE UP (ref 7–23)
BUN SERPL-MCNC: 11 MG/DL — SIGNIFICANT CHANGE UP (ref 7–23)
CALCIUM SERPL-MCNC: 8.6 MG/DL — SIGNIFICANT CHANGE UP (ref 8.4–10.5)
CALCIUM SERPL-MCNC: 9.1 MG/DL — SIGNIFICANT CHANGE UP (ref 8.4–10.5)
CHLORIDE SERPL-SCNC: 91 MMOL/L — LOW (ref 96–108)
CHLORIDE SERPL-SCNC: 94 MMOL/L — LOW (ref 96–108)
CO2 SERPL-SCNC: 27 MMOL/L — SIGNIFICANT CHANGE UP (ref 22–31)
CO2 SERPL-SCNC: 29 MMOL/L — SIGNIFICANT CHANGE UP (ref 22–31)
CREAT SERPL-MCNC: 0.43 MG/DL — LOW (ref 0.5–1.3)
CREAT SERPL-MCNC: 0.43 MG/DL — LOW (ref 0.5–1.3)
GLUCOSE SERPL-MCNC: 114 MG/DL — HIGH (ref 70–99)
GLUCOSE SERPL-MCNC: 140 MG/DL — HIGH (ref 70–99)
HCT VFR BLD CALC: 26.8 % — LOW (ref 34.5–45)
HGB BLD-MCNC: 9 G/DL — LOW (ref 11.5–15.5)
MAGNESIUM SERPL-MCNC: 2.2 MG/DL — SIGNIFICANT CHANGE UP (ref 1.6–2.6)
MCHC RBC-ENTMCNC: 29.5 PG — SIGNIFICANT CHANGE UP (ref 27–34)
MCHC RBC-ENTMCNC: 33.6 GM/DL — SIGNIFICANT CHANGE UP (ref 32–36)
MCV RBC AUTO: 87.9 FL — SIGNIFICANT CHANGE UP (ref 80–100)
NRBC # BLD: 0 /100 WBCS — SIGNIFICANT CHANGE UP (ref 0–0)
PLATELET # BLD AUTO: 268 K/UL — SIGNIFICANT CHANGE UP (ref 150–400)
POTASSIUM SERPL-MCNC: 3.6 MMOL/L — SIGNIFICANT CHANGE UP (ref 3.5–5.3)
POTASSIUM SERPL-MCNC: 4.5 MMOL/L — SIGNIFICANT CHANGE UP (ref 3.5–5.3)
POTASSIUM SERPL-SCNC: 3.6 MMOL/L — SIGNIFICANT CHANGE UP (ref 3.5–5.3)
POTASSIUM SERPL-SCNC: 4.5 MMOL/L — SIGNIFICANT CHANGE UP (ref 3.5–5.3)
PROT SERPL-MCNC: 6.5 G/DL — SIGNIFICANT CHANGE UP (ref 6–8.3)
RBC # BLD: 3.05 M/UL — LOW (ref 3.8–5.2)
RBC # FLD: 15.1 % — HIGH (ref 10.3–14.5)
SODIUM SERPL-SCNC: 134 MMOL/L — LOW (ref 135–145)
SODIUM SERPL-SCNC: 135 MMOL/L — SIGNIFICANT CHANGE UP (ref 135–145)
WBC # BLD: 11.35 K/UL — HIGH (ref 3.8–10.5)
WBC # FLD AUTO: 11.35 K/UL — HIGH (ref 3.8–10.5)

## 2019-11-17 RX ORDER — ATENOLOL 25 MG/1
100 TABLET ORAL EVERY 12 HOURS
Refills: 0 | Status: DISCONTINUED | OUTPATIENT
Start: 2019-11-18 | End: 2019-11-18

## 2019-11-17 RX ORDER — ATENOLOL 25 MG/1
25 TABLET ORAL ONCE
Refills: 0 | Status: COMPLETED | OUTPATIENT
Start: 2019-11-17 | End: 2019-11-17

## 2019-11-17 RX ORDER — MAGNESIUM SULFATE 500 MG/ML
2 VIAL (ML) INJECTION ONCE
Refills: 0 | Status: COMPLETED | OUTPATIENT
Start: 2019-11-17 | End: 2019-11-17

## 2019-11-17 RX ORDER — POTASSIUM CHLORIDE 20 MEQ
40 PACKET (EA) ORAL
Refills: 0 | Status: COMPLETED | OUTPATIENT
Start: 2019-11-17 | End: 2019-11-17

## 2019-11-17 RX ADMIN — Medication 50 GRAM(S): at 07:53

## 2019-11-17 RX ADMIN — CHLORHEXIDINE GLUCONATE 1 APPLICATION(S): 213 SOLUTION TOPICAL at 07:46

## 2019-11-17 RX ADMIN — SPIRONOLACTONE 50 MILLIGRAM(S): 25 TABLET, FILM COATED ORAL at 05:25

## 2019-11-17 RX ADMIN — ATENOLOL 25 MILLIGRAM(S): 25 TABLET ORAL at 20:03

## 2019-11-17 RX ADMIN — PANTOPRAZOLE SODIUM 40 MILLIGRAM(S): 20 TABLET, DELAYED RELEASE ORAL at 05:27

## 2019-11-17 RX ADMIN — ATENOLOL 75 MILLIGRAM(S): 25 TABLET ORAL at 12:06

## 2019-11-17 RX ADMIN — Medication 81 MILLIGRAM(S): at 12:06

## 2019-11-17 RX ADMIN — ENOXAPARIN SODIUM 30 MILLIGRAM(S): 100 INJECTION SUBCUTANEOUS at 05:25

## 2019-11-17 RX ADMIN — Medication 40 MILLIEQUIVALENT(S): at 09:11

## 2019-11-17 RX ADMIN — Medication 40 MILLIGRAM(S): at 05:25

## 2019-11-17 RX ADMIN — ATORVASTATIN CALCIUM 20 MILLIGRAM(S): 80 TABLET, FILM COATED ORAL at 21:20

## 2019-11-17 RX ADMIN — Medication 40 MILLIEQUIVALENT(S): at 07:53

## 2019-11-17 RX ADMIN — Medication 2: at 14:20

## 2019-11-17 RX ADMIN — ENOXAPARIN SODIUM 30 MILLIGRAM(S): 100 INJECTION SUBCUTANEOUS at 17:33

## 2019-11-17 NOTE — PROGRESS NOTE ADULT - SUBJECTIVE AND OBJECTIVE BOX
Interval Hx; Events Overnight:  SUBJECTIVE: "I feel good, I am breathing better  "    LABS:                9.0                  134  | 29   | 11           11.35 >-----------< 268     ------------------------< 140                   26.8                 3.6  | 91   | 0.43                                         Ca 8.6   Mg x     Ph x              VITAL SIGNS    Telemetry: SR 70s     Daily     Daily Weight in k.8 (2019 08:21)      Vital Signs Last 24 Hrs  T(C): 36.3 (19 @ 13:50), Max: 36.5 (19 @ 20:09)  T(F): 97.3 (19 @ 13:50), Max: 97.7 (19 @ 20:09)  HR: 76 (19 @ 13:50) (76 - 84)  BP: 108/71 (19 @ 13:50) (107/71 - 120/78)  RR: 18 (19 @ 14:56) (18 - 20)  SpO2: 93% (19 @ 14:56) (86% - 95%)             I&O's Detail    2019 07:  -  2019 07:00  --------------------------------------------------------  IN:    Oral Fluid: 660 mL  Total IN: 660 mL    OUT:    Voided: 1950 mL  Total OUT: 1950 mL    Total NET: -1290 mL      2019 07:01  -  2019 15:04  --------------------------------------------------------  IN:    IV PiggyBack: 50 mL    Oral Fluid: 278 mL  Total IN: 328 mL    OUT:  Total OUT: 0 mL    Total NET: 328 mL                    GLUCOSE  CAPILLARY BLOOD GLUCOSE      POCT Blood Glucose.: 177 mg/dL (2019 14:18)  POCT Blood Glucose.: 133 mg/dL (2019 08:00)  POCT Blood Glucose.: 128 mg/dL (2019 21:49)  POCT Blood Glucose.: 137 mg/dL (2019 16:38)            Tubes/Lines/Drains                  PHYSICAL EXAM      General: NAD, well appearing, in no distress  Neurology: A&O x3, non focal, no neuro deficits. Moves all extremities to command.  CV : s1 s2 RRR, no murmurs, gallops, clicks.   Sternal Wound :  Midsternal incision clean , stable  Lungs: clear to auscultation  Abdomen: soft, nontender, nondistended, positive bowel sounds, obese, +abd binder  :    voiding   Extremities:    trace b/l  edema. + pedal pulses      Skin: intact, no lesions        MEDICATIONS  aspirin enteric coated 81 milliGRAM(s) Oral daily  ATENolol  Tablet 75 milliGRAM(s) Oral every 12 hours  atorvastatin 20 milliGRAM(s) Oral at bedtime  chlorhexidine 2% Cloths 1 Application(s) Topical <User Schedule>  dextrose 50% Injectable 50 milliLiter(s) IV Push every 15 minutes  dextrose 50% Injectable 25 milliLiter(s) IV Push every 15 minutes  enoxaparin Injectable 30 milliGRAM(s) SubCutaneous two times a day  furosemide    Tablet 40 milliGRAM(s) Oral daily  insulin lispro (HumaLOG) corrective regimen sliding scale   SubCutaneous Before meals and at bedtime  oxycodone    5 mG/acetaminophen 325 mG 1 Tablet(s) Oral every 6 hours PRN  oxycodone    5 mG/acetaminophen 325 mG 2 Tablet(s) Oral every 6 hours PRN  pantoprazole    Tablet 40 milliGRAM(s) Oral before breakfast  polyethylene glycol 3350 17 Gram(s) Oral daily  spironolactone 50 milliGRAM(s) Oral daily

## 2019-11-17 NOTE — PROGRESS NOTE ADULT - ASSESSMENT
64 yo female, PMH HTN, HLD, COPD, DM2, + Cologuard test, going through cardiac clearance for colonoscopy, Echocardiogram revealed an aortic root 5.6 cm ascending aorta 5.8 cm. Pt. referred to CT surgery.  11/12: Aortic Root Replacement with Ascending Aorta Replacement/Aortic Valve Repair with Dr. Morin. Received intra-op products. Recovered in the ICU. Extubated at 2300.   11/13: UOP decreased, given Albumin, stable BP this PM at 110/59. Transferred to SDU in stable condition.   Binder in place with R pleural CT, 2 med CT, Jeannie, PW 50/10. Resting comfortably in the chair. Started on low dose BB-->Lopressor 12.5mg Q8H and tolerated well thus far. Will cont to monitor urine output, BP, and blood sugars.   11/14 VSS - 's lopressor increased to 50mg po q8h, lasix 40mg po daily & aldactone 50mg po daily initiated .  MS CT & L CT d/c'd - cxr ordered. O2 4lnc   11/15: VSS. ST in 110s, lopressor changed to Atenolol 75mg BID with stable BP. Remains on Lasix and Aldactone.   11/16 Continue with diuresis. Remains 5kg > preop weight.  DC pacing wires. Anticipate discharge home Sunday.  11/17 HD stable, cont diureses, K+ and Mg repleted. Home w/ PT on Monday. Net -1.2 L, now tolerating room air, O2 sat 93% w/o supplemental O2 after walking.   Discharge planning-home with PT and rolling walker.

## 2019-11-17 NOTE — PROGRESS NOTE ADULT - REASON FOR ADMISSION
Ascending Aortic replacement  / HemiArch replacement / Bental
Aideall
AVR
Dilated ASC Ao and Aortic root, moderate AI
sob
Dilated ASC Ao and Aortic root, moderate AI

## 2019-11-18 ENCOUNTER — TRANSCRIPTION ENCOUNTER (OUTPATIENT)
Age: 64
End: 2019-11-18

## 2019-11-18 VITALS — WEIGHT: 236.12 LBS

## 2019-11-18 PROBLEM — I10 ESSENTIAL (PRIMARY) HYPERTENSION: Chronic | Status: ACTIVE | Noted: 2019-11-07

## 2019-11-18 PROBLEM — J44.9 CHRONIC OBSTRUCTIVE PULMONARY DISEASE, UNSPECIFIED: Chronic | Status: ACTIVE | Noted: 2019-11-07

## 2019-11-18 PROBLEM — R91.1 SOLITARY PULMONARY NODULE: Chronic | Status: ACTIVE | Noted: 2019-11-07

## 2019-11-18 PROBLEM — Z86.79 PERSONAL HISTORY OF OTHER DISEASES OF THE CIRCULATORY SYSTEM: Chronic | Status: ACTIVE | Noted: 2019-11-07

## 2019-11-18 PROBLEM — Z87.891 PERSONAL HISTORY OF NICOTINE DEPENDENCE: Chronic | Status: ACTIVE | Noted: 2019-10-07

## 2019-11-18 PROBLEM — E11.9 TYPE 2 DIABETES MELLITUS WITHOUT COMPLICATIONS: Chronic | Status: ACTIVE | Noted: 2019-11-07

## 2019-11-18 LAB
ALBUMIN SERPL ELPH-MCNC: 3.6 G/DL — SIGNIFICANT CHANGE UP (ref 3.3–5)
ALP SERPL-CCNC: 59 U/L — SIGNIFICANT CHANGE UP (ref 40–120)
ALT FLD-CCNC: 29 U/L — SIGNIFICANT CHANGE UP (ref 10–45)
ANION GAP SERPL CALC-SCNC: 14 MMOL/L — SIGNIFICANT CHANGE UP (ref 5–17)
AST SERPL-CCNC: 17 U/L — SIGNIFICANT CHANGE UP (ref 10–40)
BILIRUB SERPL-MCNC: 0.4 MG/DL — SIGNIFICANT CHANGE UP (ref 0.2–1.2)
BUN SERPL-MCNC: 8 MG/DL — SIGNIFICANT CHANGE UP (ref 7–23)
CALCIUM SERPL-MCNC: 9 MG/DL — SIGNIFICANT CHANGE UP (ref 8.4–10.5)
CHLORIDE SERPL-SCNC: 91 MMOL/L — LOW (ref 96–108)
CO2 SERPL-SCNC: 30 MMOL/L — SIGNIFICANT CHANGE UP (ref 22–31)
CREAT SERPL-MCNC: 0.42 MG/DL — LOW (ref 0.5–1.3)
GLUCOSE SERPL-MCNC: 130 MG/DL — HIGH (ref 70–99)
HCT VFR BLD CALC: 27.6 % — LOW (ref 34.5–45)
HGB BLD-MCNC: 8.9 G/DL — LOW (ref 11.5–15.5)
MAGNESIUM SERPL-MCNC: 2.1 MG/DL — SIGNIFICANT CHANGE UP (ref 1.6–2.6)
MCHC RBC-ENTMCNC: 28.8 PG — SIGNIFICANT CHANGE UP (ref 27–34)
MCHC RBC-ENTMCNC: 32.2 GM/DL — SIGNIFICANT CHANGE UP (ref 32–36)
MCV RBC AUTO: 89.3 FL — SIGNIFICANT CHANGE UP (ref 80–100)
NRBC # BLD: 0 /100 WBCS — SIGNIFICANT CHANGE UP (ref 0–0)
PLATELET # BLD AUTO: 325 K/UL — SIGNIFICANT CHANGE UP (ref 150–400)
POTASSIUM SERPL-MCNC: 3.8 MMOL/L — SIGNIFICANT CHANGE UP (ref 3.5–5.3)
POTASSIUM SERPL-SCNC: 3.8 MMOL/L — SIGNIFICANT CHANGE UP (ref 3.5–5.3)
PROT SERPL-MCNC: 6.6 G/DL — SIGNIFICANT CHANGE UP (ref 6–8.3)
RBC # BLD: 3.09 M/UL — LOW (ref 3.8–5.2)
RBC # FLD: 15.4 % — HIGH (ref 10.3–14.5)
SODIUM SERPL-SCNC: 135 MMOL/L — SIGNIFICANT CHANGE UP (ref 135–145)
WBC # BLD: 12.62 K/UL — HIGH (ref 3.8–10.5)
WBC # FLD AUTO: 12.62 K/UL — HIGH (ref 3.8–10.5)

## 2019-11-18 PROCEDURE — 88305 TISSUE EXAM BY PATHOLOGIST: CPT

## 2019-11-18 PROCEDURE — 86900 BLOOD TYPING SEROLOGIC ABO: CPT

## 2019-11-18 PROCEDURE — 97116 GAIT TRAINING THERAPY: CPT

## 2019-11-18 PROCEDURE — 82803 BLOOD GASES ANY COMBINATION: CPT

## 2019-11-18 PROCEDURE — C1769: CPT

## 2019-11-18 PROCEDURE — 83735 ASSAY OF MAGNESIUM: CPT

## 2019-11-18 PROCEDURE — 31720 CLEARANCE OF AIRWAYS: CPT

## 2019-11-18 PROCEDURE — 85027 COMPLETE CBC AUTOMATED: CPT

## 2019-11-18 PROCEDURE — 82550 ASSAY OF CK (CPK): CPT

## 2019-11-18 PROCEDURE — 85610 PROTHROMBIN TIME: CPT

## 2019-11-18 PROCEDURE — 83605 ASSAY OF LACTIC ACID: CPT

## 2019-11-18 PROCEDURE — 88312 SPECIAL STAINS GROUP 1: CPT

## 2019-11-18 PROCEDURE — 84295 ASSAY OF SERUM SODIUM: CPT

## 2019-11-18 PROCEDURE — 85384 FIBRINOGEN ACTIVITY: CPT

## 2019-11-18 PROCEDURE — P9059: CPT

## 2019-11-18 PROCEDURE — 80048 BASIC METABOLIC PNL TOTAL CA: CPT

## 2019-11-18 PROCEDURE — 71045 X-RAY EXAM CHEST 1 VIEW: CPT | Mod: 26

## 2019-11-18 PROCEDURE — 82565 ASSAY OF CREATININE: CPT

## 2019-11-18 PROCEDURE — 82947 ASSAY GLUCOSE BLOOD QUANT: CPT

## 2019-11-18 PROCEDURE — P9047: CPT

## 2019-11-18 PROCEDURE — C1751: CPT

## 2019-11-18 PROCEDURE — 82435 ASSAY OF BLOOD CHLORIDE: CPT

## 2019-11-18 PROCEDURE — 85730 THROMBOPLASTIN TIME PARTIAL: CPT

## 2019-11-18 PROCEDURE — 82553 CREATINE MB FRACTION: CPT

## 2019-11-18 PROCEDURE — P9041: CPT

## 2019-11-18 PROCEDURE — P9012: CPT

## 2019-11-18 PROCEDURE — 94002 VENT MGMT INPAT INIT DAY: CPT

## 2019-11-18 PROCEDURE — 86891 AUTOLOGOUS BLOOD OP SALVAGE: CPT

## 2019-11-18 PROCEDURE — C1889: CPT

## 2019-11-18 PROCEDURE — 97530 THERAPEUTIC ACTIVITIES: CPT

## 2019-11-18 PROCEDURE — 82962 GLUCOSE BLOOD TEST: CPT

## 2019-11-18 PROCEDURE — P9016: CPT

## 2019-11-18 PROCEDURE — P9045: CPT

## 2019-11-18 PROCEDURE — 84100 ASSAY OF PHOSPHORUS: CPT

## 2019-11-18 PROCEDURE — 82330 ASSAY OF CALCIUM: CPT

## 2019-11-18 PROCEDURE — 85396 CLOTTING ASSAY WHOLE BLOOD: CPT

## 2019-11-18 PROCEDURE — 97161 PT EVAL LOW COMPLEX 20 MIN: CPT

## 2019-11-18 PROCEDURE — 71045 X-RAY EXAM CHEST 1 VIEW: CPT

## 2019-11-18 PROCEDURE — 93005 ELECTROCARDIOGRAM TRACING: CPT

## 2019-11-18 PROCEDURE — 86923 COMPATIBILITY TEST ELECTRIC: CPT

## 2019-11-18 PROCEDURE — P9011: CPT

## 2019-11-18 PROCEDURE — 85014 HEMATOCRIT: CPT

## 2019-11-18 PROCEDURE — 86901 BLOOD TYPING SEROLOGIC RH(D): CPT

## 2019-11-18 PROCEDURE — 36430 TRANSFUSION BLD/BLD COMPNT: CPT

## 2019-11-18 PROCEDURE — 80053 COMPREHEN METABOLIC PANEL: CPT

## 2019-11-18 PROCEDURE — P9037: CPT

## 2019-11-18 PROCEDURE — 84484 ASSAY OF TROPONIN QUANT: CPT

## 2019-11-18 PROCEDURE — C1768: CPT

## 2019-11-18 PROCEDURE — 84132 ASSAY OF SERUM POTASSIUM: CPT

## 2019-11-18 RX ORDER — ASPIRIN/CALCIUM CARB/MAGNESIUM 324 MG
1 TABLET ORAL
Qty: 30 | Refills: 0
Start: 2019-11-18 | End: 2019-12-17

## 2019-11-18 RX ORDER — METOPROLOL TARTRATE 50 MG
1 TABLET ORAL
Qty: 0 | Refills: 0 | DISCHARGE

## 2019-11-18 RX ORDER — POTASSIUM CHLORIDE 20 MEQ
20 PACKET (EA) ORAL ONCE
Refills: 0 | Status: COMPLETED | OUTPATIENT
Start: 2019-11-18 | End: 2019-11-18

## 2019-11-18 RX ORDER — POLYETHYLENE GLYCOL 3350 17 G/17G
17 POWDER, FOR SOLUTION ORAL
Qty: 0 | Refills: 0 | DISCHARGE
Start: 2019-11-18

## 2019-11-18 RX ORDER — SPIRONOLACTONE 25 MG/1
2 TABLET, FILM COATED ORAL
Qty: 60 | Refills: 0
Start: 2019-11-18 | End: 2019-12-17

## 2019-11-18 RX ORDER — PANTOPRAZOLE SODIUM 20 MG/1
1 TABLET, DELAYED RELEASE ORAL
Qty: 30 | Refills: 0
Start: 2019-11-18 | End: 2019-12-17

## 2019-11-18 RX ORDER — ATENOLOL 25 MG/1
1 TABLET ORAL
Qty: 60 | Refills: 0
Start: 2019-11-18 | End: 2019-12-17

## 2019-11-18 RX ORDER — METFORMIN HYDROCHLORIDE 850 MG/1
1 TABLET ORAL
Qty: 0 | Refills: 0 | DISCHARGE

## 2019-11-18 RX ORDER — FUROSEMIDE 40 MG
1 TABLET ORAL
Qty: 30 | Refills: 0
Start: 2019-11-18 | End: 2019-12-17

## 2019-11-18 RX ADMIN — ATENOLOL 100 MILLIGRAM(S): 25 TABLET ORAL at 06:00

## 2019-11-18 RX ADMIN — ENOXAPARIN SODIUM 30 MILLIGRAM(S): 100 INJECTION SUBCUTANEOUS at 06:00

## 2019-11-18 RX ADMIN — OXYCODONE AND ACETAMINOPHEN 2 TABLET(S): 5; 325 TABLET ORAL at 03:24

## 2019-11-18 RX ADMIN — PANTOPRAZOLE SODIUM 40 MILLIGRAM(S): 20 TABLET, DELAYED RELEASE ORAL at 06:00

## 2019-11-18 RX ADMIN — CHLORHEXIDINE GLUCONATE 1 APPLICATION(S): 213 SOLUTION TOPICAL at 08:39

## 2019-11-18 RX ADMIN — SPIRONOLACTONE 50 MILLIGRAM(S): 25 TABLET, FILM COATED ORAL at 06:00

## 2019-11-18 RX ADMIN — Medication 40 MILLIGRAM(S): at 06:00

## 2019-11-18 RX ADMIN — Medication 81 MILLIGRAM(S): at 11:13

## 2019-11-18 RX ADMIN — OXYCODONE AND ACETAMINOPHEN 2 TABLET(S): 5; 325 TABLET ORAL at 04:15

## 2019-11-18 RX ADMIN — Medication 20 MILLIEQUIVALENT(S): at 09:13

## 2019-11-18 NOTE — DISCHARGE NOTE PROVIDER - NSDCPNSUBOBJ_GEN_ALL_CORE
VSS    tele: RSR 70-80    midsternal incision cdi temitope- sternum stable    lungs clear, RR easy unlabored    +bs nt nd + bm; obese abdomen; neg n/v/d    LE: neg calf tenderness, +1 LE edema, PPP        Discharge pt home today 11/18 as per CTS rounds

## 2019-11-18 NOTE — DISCHARGE NOTE PROVIDER - HOSPITAL COURSE
64 yo female, PMH HTN, HLD, COPD, DM2, + Cologuard test, going through cardiac clearance for colonoscopy, Echocardiogram revealed an aortic root 5.6 cm ascending aorta 5.8 cm. Pt. referred to CT surgery.    11/12: Aortic Root Replacement with Ascending Aorta Replacement/Aortic Valve Repair with Dr. Morin. Received intra-op products. Recovered in the ICU. Extubated at 2300.     11/13: UOP decreased, given Albumin, stable BP this PM at 110/59. Transferred to SDU in stable condition.     Binder in place with R pleural CT, 2 med CT, Jeannie, PW 50/10. Resting comfortably in the chair. Started on low dose BB-->Lopressor 12.5mg Q8H and tolerated well thus far. Will cont to monitor urine output, BP, and blood sugars.     11/14 VSS - 's lopressor increased to 50mg po q8h, lasix 40mg po daily & aldactone 50mg po daily initiated .  MS CT & L CT d/c'd - cxr ordered. O2 4lnc     11/15: VSS. ST in 110s, lopressor changed to Atenolol 75mg BID with stable BP. Reand Aldactone.     11/16 Continue with diuresis. Remains 5kg > preop weight.  DC pacing wires. Anticipate discharge home Sunday.    11/17 HD stable, cont diureses, K+ and Mg repleted. Home w/ PT on Monday. Net -1.2 L, now tolerating room air, O2 sat 93% w/o supplemental O2 after walking.     11/18 discharge pt home today as per CTS rounds

## 2019-11-18 NOTE — DISCHARGE NOTE PROVIDER - NSDCMRMEDTOKEN_GEN_ALL_CORE_FT
areds eye vitamin: 1 tab(s) orally 2 times a day  aspirin 81 mg oral delayed release tablet: 1 tab(s) orally once a day  atenolol 100 mg oral tablet: 1 tab(s) orally every 12 hours  atorvastatin 20 mg oral tablet: 1 tab(s) orally once a day (at bedtime)  furosemide 40 mg oral tablet: 1 tab(s) orally once a day  metFORMIN 750 mg oral tablet, extended release: 1 tab(s) orally once a day  oxycodone-acetaminophen 5 mg-325 mg oral tablet: 1 tab(s) orally every 6 hours, As needed, Moderate Pain (4 - 6) MDD:4  pantoprazole 40 mg oral delayed release tablet: 1 tab(s) orally once a day (before a meal)  polyethylene glycol 3350 oral powder for reconstitution: 17 gram(s) orally once a day  Singulair 10 mg oral tablet: 1 tab(s) orally once a day (at bedtime)  spironolactone 25 mg oral tablet: 2 tab(s) orally once a day  Vitamin D3: 1 tab(s) orally once a day

## 2019-11-18 NOTE — DISCHARGE NOTE NURSING/CASE MANAGEMENT/SOCIAL WORK - PATIENT PORTAL LINK FT
You can access the FollowMyHealth Patient Portal offered by [facility] by registering at the following website: [email or web address]. By joining [facility]’s FollowMyHealth portal, you will also be able to view your health information using other applications (apps) compatible with our system.

## 2019-11-18 NOTE — DISCHARGE NOTE PROVIDER - REASON FOR ADMISSION
s/p 11/12/19 open heart surgery: Aortic valve replacement with tissue valve/ aortic root replacement; ascending aortic replacement

## 2019-11-18 NOTE — DISCHARGE NOTE PROVIDER - NSDCCPCAREPLAN_GEN_ALL_CORE_FT
PRINCIPAL DISCHARGE DIAGNOSIS  Diagnosis: S/P aortic valve replacement  Assessment and Plan of Treatment: shower daily  weigh yourself daily  continue current prescriptions as ordered  increase activity as tolerated   no added salt; low fat; low cholesterol, low salt diabetic diet.   check blood sugar levels before meals and at bedtime- record levels  follow up with primary care doctor, for diabetic management within 1-2 weeks of discharge. Call to schedule appointment.   follow up with Cardiologist in 1-2 weeks. Call to schedule appointment.  follow up with cardiac surgeon, Dr. Morin on Dec. 11th 11:30 am at [facility].  Call to confirm appointment. [phone]  follow up with cardiac surgery NP's Nov. 27th 9:00 am at [facility]. CAll to confirm appointment. [phone]

## 2019-11-18 NOTE — DISCHARGE NOTE PROVIDER - NSDCFUADDINST_GEN_ALL_CORE_FT
no driving until cleared by Dr. Morin  refer to cardiac surgery do and don't checklist  call Dr. Morin for fever > 101  check blood sugar levels before meals and at bedtime- record levels

## 2019-11-18 NOTE — DISCHARGE NOTE PROVIDER - CARE PROVIDERS DIRECT ADDRESSES
[email or web address]

## 2019-11-18 NOTE — DISCHARGE NOTE PROVIDER - CARE PROVIDER_API CALL
Solomon Morin (MD)  Surgery; Thoracic and Cardiac Surgery  [address] [place] [address]  Phone: [phone]  Fax: [phone]  Follow Up Time:

## 2019-11-18 NOTE — DISCHARGE NOTE PROVIDER - NSDCACTIVITY_GEN_ALL_CORE
Stairs allowed/No heavy lifting/straining/Walking - Outdoors allowed/Showering allowed/Do not make important decisions/Walking - Indoors allowed/Sex allowed/Do not drive or operate machinery

## 2019-11-20 ENCOUNTER — APPOINTMENT (OUTPATIENT)
Dept: CARE COORDINATION | Facility: HOME HEALTH | Age: 64
End: 2019-11-20
Payer: COMMERCIAL

## 2019-11-20 VITALS
RESPIRATION RATE: 16 BRPM | DIASTOLIC BLOOD PRESSURE: 76 MMHG | OXYGEN SATURATION: 94 % | SYSTOLIC BLOOD PRESSURE: 118 MMHG | BODY MASS INDEX: 39.65 KG/M2 | HEART RATE: 72 BPM | WEIGHT: 231 LBS

## 2019-11-20 PROCEDURE — 99024 POSTOP FOLLOW-UP VISIT: CPT

## 2019-11-20 RX ORDER — METFORMIN HYDROCHLORIDE 625 MG/1
TABLET ORAL
Refills: 0 | Status: DISCONTINUED | COMMUNITY
End: 2019-11-20

## 2019-11-20 RX ORDER — SODIUM SULFATE, POTASSIUM SULFATE, MAGNESIUM SULFATE 17.5; 3.13; 1.6 G/ML; G/ML; G/ML
17.5-3.13-1.6 SOLUTION, CONCENTRATE ORAL
Qty: 1 | Refills: 0 | Status: DISCONTINUED | COMMUNITY
Start: 2019-08-29 | End: 2019-11-20

## 2019-11-20 RX ORDER — METOPROLOL TARTRATE 50 MG/1
50 TABLET, FILM COATED ORAL DAILY
Refills: 0 | Status: DISCONTINUED | COMMUNITY
Start: 2019-10-28 | End: 2019-11-20

## 2019-11-20 NOTE — PHYSICAL EXAM
[Auscultation Breath Sounds / Voice Sounds] : lungs were clear to auscultation bilaterally [Respiration, Rhythm And Depth] : normal respiratory rhythm and effort [Heart Sounds] : normal S1 and S2 [FreeTextEntry1] : MSI, CT sites and SVG sites without erythema, drainage or warmth, with edges well approximated.  Sternum stable. BLE 1+ edema, large pendulous breasts, abdominal binder around them [Heart Rate And Rhythm] : heart rate was normal and rhythm regular [Bowel Sounds] : normal bowel sounds [Abdomen Soft] : soft [Abdomen Tenderness] : non-tender

## 2019-11-20 NOTE — ASSESSMENT
[FreeTextEntry1] : 63F s/p AVR, sneurysm repair.  stopped smoking before procedure, states no urge to smoke again, defers tobacco cessation

## 2019-11-20 NOTE — HISTORY OF PRESENT ILLNESS
[FreeTextEntry1] : 63F s/p AVR and aneurysm repair Dr. Morin, sisters staying with patient in apartment

## 2019-11-26 PROBLEM — Z09 POSTOP CHECK: Status: RESOLVED | Noted: 2018-03-05 | Resolved: 2019-11-26

## 2019-11-26 RX ORDER — UBIDECARENONE/VIT E ACET 100MG-5
CAPSULE ORAL DAILY
Refills: 0 | Status: ACTIVE | COMMUNITY

## 2019-11-27 ENCOUNTER — INPATIENT (INPATIENT)
Facility: HOSPITAL | Age: 64
LOS: 6 days | Discharge: HOME | DRG: 315 | End: 2019-12-04
Attending: STUDENT IN AN ORGANIZED HEALTH CARE EDUCATION/TRAINING PROGRAM | Admitting: STUDENT IN AN ORGANIZED HEALTH CARE EDUCATION/TRAINING PROGRAM
Payer: COMMERCIAL

## 2019-11-27 ENCOUNTER — APPOINTMENT (OUTPATIENT)
Dept: CARDIOTHORACIC SURGERY | Facility: CLINIC | Age: 64
End: 2019-11-27

## 2019-11-27 ENCOUNTER — NON-APPOINTMENT (OUTPATIENT)
Age: 64
End: 2019-11-27

## 2019-11-27 VITALS
BODY MASS INDEX: 37.44 KG/M2 | HEART RATE: 131 BPM | WEIGHT: 219.3 LBS | OXYGEN SATURATION: 16 % | RESPIRATION RATE: 16 BRPM | TEMPERATURE: 98.1 F | HEIGHT: 64 IN

## 2019-11-27 VITALS
TEMPERATURE: 98 F | WEIGHT: 219.8 LBS | DIASTOLIC BLOOD PRESSURE: 57 MMHG | HEART RATE: 114 BPM | HEIGHT: 63 IN | SYSTOLIC BLOOD PRESSURE: 89 MMHG | OXYGEN SATURATION: 96 % | RESPIRATION RATE: 18 BRPM

## 2019-11-27 VITALS — SYSTOLIC BLOOD PRESSURE: 77 MMHG | DIASTOLIC BLOOD PRESSURE: 35 MMHG

## 2019-11-27 VITALS — SYSTOLIC BLOOD PRESSURE: 88 MMHG | DIASTOLIC BLOOD PRESSURE: 59 MMHG

## 2019-11-27 DIAGNOSIS — I48.91 UNSPECIFIED ATRIAL FIBRILLATION: ICD-10-CM

## 2019-11-27 DIAGNOSIS — Z09 ENCOUNTER FOR FOLLOW-UP EXAMINATION AFTER COMPLETED TREATMENT FOR CONDITIONS OTHER THAN MALIGNANT NEOPLASM: ICD-10-CM

## 2019-11-27 DIAGNOSIS — Z98.890 OTHER SPECIFIED POSTPROCEDURAL STATES: Chronic | ICD-10-CM

## 2019-11-27 DIAGNOSIS — Z95.2 PRESENCE OF PROSTHETIC HEART VALVE: Chronic | ICD-10-CM

## 2019-11-27 LAB
ALBUMIN SERPL ELPH-MCNC: 3.7 G/DL — SIGNIFICANT CHANGE UP (ref 3.3–5)
ALP SERPL-CCNC: 72 U/L — SIGNIFICANT CHANGE UP (ref 40–120)
ALT FLD-CCNC: 39 U/L — SIGNIFICANT CHANGE UP (ref 10–45)
ANION GAP SERPL CALC-SCNC: 17 MMOL/L — SIGNIFICANT CHANGE UP (ref 5–17)
APTT BLD: 29.2 SEC — SIGNIFICANT CHANGE UP (ref 27.5–36.3)
APTT BLD: 55 SEC — HIGH (ref 27.5–36.3)
AST SERPL-CCNC: 37 U/L — SIGNIFICANT CHANGE UP (ref 10–40)
BILIRUB SERPL-MCNC: 0.4 MG/DL — SIGNIFICANT CHANGE UP (ref 0.2–1.2)
BLD GP AB SCN SERPL QL: NEGATIVE — SIGNIFICANT CHANGE UP
BUN SERPL-MCNC: 14 MG/DL — SIGNIFICANT CHANGE UP (ref 7–23)
CALCIUM SERPL-MCNC: 9.6 MG/DL — SIGNIFICANT CHANGE UP (ref 8.4–10.5)
CHLORIDE SERPL-SCNC: 95 MMOL/L — LOW (ref 96–108)
CO2 SERPL-SCNC: 25 MMOL/L — SIGNIFICANT CHANGE UP (ref 22–31)
CREAT SERPL-MCNC: 0.55 MG/DL — SIGNIFICANT CHANGE UP (ref 0.5–1.3)
GLUCOSE BLDC GLUCOMTR-MCNC: 107 MG/DL — HIGH (ref 70–99)
GLUCOSE BLDC GLUCOMTR-MCNC: 139 MG/DL — HIGH (ref 70–99)
GLUCOSE SERPL-MCNC: 150 MG/DL — HIGH (ref 70–99)
HCT VFR BLD CALC: 32.9 % — LOW (ref 34.5–45)
HGB BLD-MCNC: 10.8 G/DL — LOW (ref 11.5–15.5)
INR BLD: 1.35 RATIO — HIGH (ref 0.88–1.16)
MCHC RBC-ENTMCNC: 29.3 PG — SIGNIFICANT CHANGE UP (ref 27–34)
MCHC RBC-ENTMCNC: 32.8 GM/DL — SIGNIFICANT CHANGE UP (ref 32–36)
MCV RBC AUTO: 89.4 FL — SIGNIFICANT CHANGE UP (ref 80–100)
NRBC # BLD: 0 /100 WBCS — SIGNIFICANT CHANGE UP (ref 0–0)
PLATELET # BLD AUTO: 562 K/UL — HIGH (ref 150–400)
POTASSIUM SERPL-MCNC: 5 MMOL/L — SIGNIFICANT CHANGE UP (ref 3.5–5.3)
POTASSIUM SERPL-SCNC: 5 MMOL/L — SIGNIFICANT CHANGE UP (ref 3.5–5.3)
PROT SERPL-MCNC: 7.5 G/DL — SIGNIFICANT CHANGE UP (ref 6–8.3)
PROTHROM AB SERPL-ACNC: 15.5 SEC — HIGH (ref 10–12.9)
RBC # BLD: 3.68 M/UL — LOW (ref 3.8–5.2)
RBC # FLD: 15.9 % — HIGH (ref 10.3–14.5)
RH IG SCN BLD-IMP: POSITIVE — SIGNIFICANT CHANGE UP
SODIUM SERPL-SCNC: 137 MMOL/L — SIGNIFICANT CHANGE UP (ref 135–145)
WBC # BLD: 13.58 K/UL — HIGH (ref 3.8–10.5)
WBC # FLD AUTO: 13.58 K/UL — HIGH (ref 3.8–10.5)

## 2019-11-27 PROCEDURE — 99024 POSTOP FOLLOW-UP VISIT: CPT

## 2019-11-27 PROCEDURE — 71045 X-RAY EXAM CHEST 1 VIEW: CPT | Mod: 26

## 2019-11-27 PROCEDURE — 93010 ELECTROCARDIOGRAM REPORT: CPT

## 2019-11-27 PROCEDURE — 93306 TTE W/DOPPLER COMPLETE: CPT | Mod: 26

## 2019-11-27 RX ORDER — INSULIN LISPRO 100/ML
VIAL (ML) SUBCUTANEOUS
Refills: 0 | Status: DISCONTINUED | OUTPATIENT
Start: 2019-11-27 | End: 2019-12-04

## 2019-11-27 RX ORDER — MONTELUKAST 4 MG/1
10 TABLET, CHEWABLE ORAL AT BEDTIME
Refills: 0 | Status: DISCONTINUED | OUTPATIENT
Start: 2019-11-27 | End: 2019-12-04

## 2019-11-27 RX ORDER — ACETAMINOPHEN 500 MG
650 TABLET ORAL EVERY 6 HOURS
Refills: 0 | Status: DISCONTINUED | OUTPATIENT
Start: 2019-11-27 | End: 2019-12-04

## 2019-11-27 RX ORDER — ASPIRIN/CALCIUM CARB/MAGNESIUM 324 MG
81 TABLET ORAL DAILY
Refills: 0 | Status: DISCONTINUED | OUTPATIENT
Start: 2019-11-27 | End: 2019-12-04

## 2019-11-27 RX ORDER — ATORVASTATIN CALCIUM 80 MG/1
20 TABLET, FILM COATED ORAL AT BEDTIME
Refills: 0 | Status: DISCONTINUED | OUTPATIENT
Start: 2019-11-27 | End: 2019-12-04

## 2019-11-27 RX ORDER — HEPARIN SODIUM 5000 [USP'U]/ML
1800 INJECTION INTRAVENOUS; SUBCUTANEOUS
Qty: 25000 | Refills: 0 | Status: DISCONTINUED | OUTPATIENT
Start: 2019-11-27 | End: 2019-11-29

## 2019-11-27 RX ORDER — LORATADINE 10 MG
17 TABLET,DISINTEGRATING ORAL DAILY
Qty: 1 | Refills: 1 | Status: COMPLETED | COMMUNITY
Start: 2019-11-26 | End: 2019-11-27

## 2019-11-27 RX ORDER — OXYCODONE AND ACETAMINOPHEN 5; 325 MG/1; MG/1
5-325 TABLET ORAL
Refills: 0 | Status: COMPLETED | COMMUNITY
Start: 2019-11-20 | End: 2019-11-27

## 2019-11-27 RX ORDER — PANTOPRAZOLE SODIUM 20 MG/1
40 TABLET, DELAYED RELEASE ORAL
Refills: 0 | Status: DISCONTINUED | OUTPATIENT
Start: 2019-11-27 | End: 2019-12-04

## 2019-11-27 RX ORDER — SODIUM CHLORIDE 9 MG/ML
250 INJECTION INTRAMUSCULAR; INTRAVENOUS; SUBCUTANEOUS ONCE
Refills: 0 | Status: COMPLETED | OUTPATIENT
Start: 2019-11-27 | End: 2019-11-27

## 2019-11-27 RX ORDER — HEPARIN SODIUM 5000 [USP'U]/ML
1000 INJECTION INTRAVENOUS; SUBCUTANEOUS
Qty: 25000 | Refills: 0 | Status: DISCONTINUED | OUTPATIENT
Start: 2019-11-27 | End: 2019-11-27

## 2019-11-27 RX ORDER — INSULIN LISPRO 100/ML
VIAL (ML) SUBCUTANEOUS AT BEDTIME
Refills: 0 | Status: DISCONTINUED | OUTPATIENT
Start: 2019-11-27 | End: 2019-12-04

## 2019-11-27 RX ORDER — METFORMIN HYDROCHLORIDE 850 MG/1
750 TABLET ORAL DAILY
Refills: 0 | Status: DISCONTINUED | OUTPATIENT
Start: 2019-11-27 | End: 2019-12-04

## 2019-11-27 RX ORDER — METOPROLOL TARTRATE 50 MG
12.5 TABLET ORAL
Refills: 0 | Status: DISCONTINUED | OUTPATIENT
Start: 2019-11-27 | End: 2019-11-28

## 2019-11-27 RX ADMIN — ATORVASTATIN CALCIUM 20 MILLIGRAM(S): 80 TABLET, FILM COATED ORAL at 21:09

## 2019-11-27 RX ADMIN — HEPARIN SODIUM 18.5 UNIT(S)/HR: 5000 INJECTION INTRAVENOUS; SUBCUTANEOUS at 21:41

## 2019-11-27 RX ADMIN — Medication 81 MILLIGRAM(S): at 13:05

## 2019-11-27 RX ADMIN — HEPARIN SODIUM 18 UNIT(S)/HR: 5000 INJECTION INTRAVENOUS; SUBCUTANEOUS at 15:00

## 2019-11-27 RX ADMIN — MONTELUKAST 10 MILLIGRAM(S): 4 TABLET, CHEWABLE ORAL at 21:10

## 2019-11-27 RX ADMIN — SODIUM CHLORIDE 500 MILLILITER(S): 9 INJECTION INTRAMUSCULAR; INTRAVENOUS; SUBCUTANEOUS at 11:04

## 2019-11-27 RX ADMIN — Medication 650 MILLIGRAM(S): at 23:08

## 2019-11-27 RX ADMIN — Medication 12.5 MILLIGRAM(S): at 15:00

## 2019-11-27 RX ADMIN — Medication 650 MILLIGRAM(S): at 22:38

## 2019-11-27 NOTE — H&P ADULT - ASSESSMENT
62 yo female, PMH HTN, HLD, COPD, DM2, s/p aortic root replacement with ascending aorta replacement/aortic valve replacement on 11/12/19 with Dr. Morin presents to the office today for follow-up visit with dizziness, SOB, and palpitations. Pt states that she had been recovering well at home with the home health nurse and PT. Symptoms began acutely this AM. Denies syncope, chest pain/pressure, N/V/D, diaphoresis, or other associated symptoms. Currently the patient still endorses intermittent palpitations, but denies SOB or dizziness.   In the office pt with a systolic BP in the 70s and noted to be in SYLVAIN. Admitted to [address] [place] [address] under Dr. Ocampo for further management.  Pt with BP 90/67, remains in AF in 120-130s with palpitations. 64 yo female, PMH HTN, HLD, COPD, DM2, s/p aortic root replacement with ascending aorta replacement/aortic valve replacement on 11/12/19 with Dr. Morin presents to the office today for follow-up visit with dizziness, SOB, and palpitations. Pt states that she had been recovering well at home with the home health nurse and PT. Symptoms began acutely this AM. Denies syncope, chest pain/pressure, N/V/D, diaphoresis, or other associated symptoms. Currently the patient still endorses intermittent palpitations, but denies SOB or dizziness.   In the office pt with a systolic BP in the 70s and noted to be in SYLVAIN. Admitted to [address] [place] [address] under Dr. Ocampo for further management.  Remains in SYLVAIN, BP inc to 97/69--will trial on low dose BB of Lopressor 12.5 BID. Goal of full anti-coagulation-->pt started on heparin gtt and will dose Coumadin this evening. 500 of fluid given, no appearance of fluid overload. Pt now resting comfortably. 64 yo female, PMH HTN, HLD, COPD, DM2, s/p aortic root replacement with ascending aorta replacement/aortic valve replacement on 11/12/19 with Dr. Morin presents to the office today for follow-up visit with dizziness, SOB, and palpitations. Pt states that she had been recovering well at home with the home health nurse and PT. Symptoms began acutely this AM. Denies syncope, chest pain/pressure, N/V/D, diaphoresis, or other associated symptoms. Currently the patient still endorses intermittent palpitations, but denies SOB or dizziness.   In the office pt with a systolic BP in the 70s and noted to be in SYLVAIN. Admitted to [address] [place] [address] under Dr. Ocampo for further management.  Remains in SYLVAIN, BP inc to 97/69--will trial on low dose BB of Lopressor 12.5 BID. Goal of full anti-coagulation-->pt started on heparin gtt and will start PO anticoagulation. 500ml of NS given, diuretic on hold. Pt now resting comfortably.

## 2019-11-27 NOTE — H&P ADULT - NSHPSOCIALHISTORY_GEN_ALL_CORE
. Lives at home with her .   Has 3 living children.   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   Former smoker-33 pack year history.   Alcohol use-socially.

## 2019-11-27 NOTE — H&P ADULT - HISTORY OF PRESENT ILLNESS
64 yo female, PMH HTN, HLD, COPD, DM2, s/p aortic root replacement with ascending aorta replacement/aortic valve replacement on 11/12/19 with Dr. Morin presents to the office today for follow-up visit with dizziness, SOB, and palpitations. Pt states that she had been recovering well at home with the home health nurse and PT. Symptoms began acutely this AM. Denies syncope, chest pain/pressure, N/V/D, diaphoresis, or other associated symptoms. Currently the patient still endorses intermittent palpitations, but denies SOB or dizziness. In the office pt with a systolic BP in the 70s and noted to be in SYLVAIN. Admitted to [address] [place] [address] under Dr. Ocampo for further management. 62 y/o female with PMH of HTN, HLD, COPD, DM2, s/p aortic root replacement with ascending aorta replacement/aortic valve replacement on 11/12/19 with Dr. Morin who presents to the office today for follow-up visit with c/o dizziness, SOB, and palpitations. Pt states that she had been recovering well at home with the home health nurse and PT. Symptoms began acutely this AM. Denies syncope, chest pain/pressure, N/V/D, diaphoresis, or other associated symptoms. Currently the patient still endorses intermittent palpitations, but denies SOB or dizziness. In the office pt with a systolic BP in the 70s and noted to be in Afib with RVR. Admitted to [address] [place] [address] under Dr. Ocampo for further management.

## 2019-11-27 NOTE — H&P ADULT - PROBLEM SELECTOR PLAN 1
SYLVAIN in 130s   Started on Lopressor 12.5 BID as pressure inc to 97/69--cont to monitor BP  On heparin gtt and Coumadin for goal of full anti-coagulation  Once fully anti-coagulated can start Amio SYLVAIN in 130s   Started on Lopressor 12.5 BID as pressure inc to 97/69--cont to monitor BP  On heparin gtt and will start PO AC in AM per Dr. Ocampo  Once fully anti-coagulated can start Amio Load

## 2019-11-27 NOTE — H&P ADULT - NSICDXPASTSURGICALHX_GEN_ALL_CORE_FT
PAST SURGICAL HISTORY:  Dental disorder teeth removal 2010    History of D&C 12/6/17 and 2018 for thickened endometrium    S/P AVR (aortic valve replacement)     S/P cholecystectomy 1988    S/P foot surgery bunionectomy right foot 2013  left foot 2013

## 2019-11-27 NOTE — H&P ADULT - NSHPREVIEWOFSYSTEMS_GEN_ALL_CORE
CONSTITUTIONAL: No fever, weight loss, or fatigue  EYES: No eye pain, visual disturbances, or discharge  ENMT:  No tinnitus, vertigo, dysphagia  RESPIRATORY: No cough, SOB, wheezing, chills or hemoptysis  CARDIOVASCULAR: (+) palpitations, No chest pain, dizziness, or leg swelling  GASTROINTESTINAL: No abdominal pain. No nausea, vomiting, or diarrhea.  GENITOURINARY: No dysuria, frequency, hematuria, or incontinence.  NEUROLOGICAL: No headaches, memory loss, loss of strength, numbness, or tremors  SKIN: No itching, burning, rashes, or lesions   ENDOCRINE: No heat or cold intolerance  MUSCULOSKELETAL: No joint pain or swelling

## 2019-11-27 NOTE — H&P ADULT - NSHPLABSRESULTS_GEN_ALL_CORE
Comprehensive Metabolic Panel (11.27.19 @ 11:48)    Sodium, Serum: 137 mmol/L    Potassium, Serum: 5.0: Moderate Hemolysis, results may be falsely elevated mmol/L    Chloride, Serum: 95 mmol/L    Carbon Dioxide, Serum: 25 mmol/L    Anion Gap, Serum: 17 mmol/L    Blood Urea Nitrogen, Serum: 14 mg/dL    Creatinine, Serum: 0.55 mg/dL    Glucose, Serum: 150 mg/dL    Calcium, Total Serum: 9.6 mg/dL    Protein Total, Serum: 7.5 g/dL    Albumin, Serum: 3.7 g/dL    Bilirubin Total, Serum: 0.4 mg/dL    Alkaline Phosphatase, Serum: 72 U/L    Aspartate Aminotransferase (AST/SGOT): 37: Moderate Hemolysis, results may be falsely elevated U/L    Alanine Aminotransferase (ALT/SGPT): 39: Moderate Hemolysis, results may be falsely elevated U/L    Complete Blood Count (11.27.19 @ 11:48)    Nucleated RBC: 0 /100 WBCs    WBC Count: 13.58 K/uL    RBC Count: 3.68 M/uL    Hemoglobin: 10.8 g/dL    Hematocrit: 32.9 %    Mean Cell Volume: 89.4 fl    Mean Cell Hemoglobin: 29.3 pg    Mean Cell Hemoglobin Conc: 32.8 gm/dL    Red Cell Distrib Width: 15.9 %    Platelet Count - Automated: 562 K/uL

## 2019-11-28 DIAGNOSIS — Z29.9 ENCOUNTER FOR PROPHYLACTIC MEASURES, UNSPECIFIED: ICD-10-CM

## 2019-11-28 DIAGNOSIS — Z98.890 OTHER SPECIFIED POSTPROCEDURAL STATES: ICD-10-CM

## 2019-11-28 LAB
ANION GAP SERPL CALC-SCNC: 12 MMOL/L — SIGNIFICANT CHANGE UP (ref 5–17)
APTT BLD: 62.6 SEC — HIGH (ref 27.5–36.3)
APTT BLD: 72.4 SEC — HIGH (ref 27.5–36.3)
APTT BLD: 93.2 SEC — HIGH (ref 27.5–36.3)
BUN SERPL-MCNC: 11 MG/DL — SIGNIFICANT CHANGE UP (ref 7–23)
CALCIUM SERPL-MCNC: 9.2 MG/DL — SIGNIFICANT CHANGE UP (ref 8.4–10.5)
CHLORIDE SERPL-SCNC: 97 MMOL/L — SIGNIFICANT CHANGE UP (ref 96–108)
CO2 SERPL-SCNC: 27 MMOL/L — SIGNIFICANT CHANGE UP (ref 22–31)
CREAT SERPL-MCNC: 0.6 MG/DL — SIGNIFICANT CHANGE UP (ref 0.5–1.3)
GLUCOSE BLDC GLUCOMTR-MCNC: 118 MG/DL — HIGH (ref 70–99)
GLUCOSE BLDC GLUCOMTR-MCNC: 130 MG/DL — HIGH (ref 70–99)
GLUCOSE BLDC GLUCOMTR-MCNC: 151 MG/DL — HIGH (ref 70–99)
GLUCOSE BLDC GLUCOMTR-MCNC: 156 MG/DL — HIGH (ref 70–99)
GLUCOSE BLDC GLUCOMTR-MCNC: 161 MG/DL — HIGH (ref 70–99)
GLUCOSE SERPL-MCNC: 160 MG/DL — HIGH (ref 70–99)
HCT VFR BLD CALC: 31.3 % — LOW (ref 34.5–45)
HCV AB S/CO SERPL IA: 0.13 S/CO — SIGNIFICANT CHANGE UP (ref 0–0.99)
HCV AB SERPL-IMP: SIGNIFICANT CHANGE UP
HGB BLD-MCNC: 10.2 G/DL — LOW (ref 11.5–15.5)
INR BLD: 1.41 RATIO — HIGH (ref 0.88–1.16)
MCHC RBC-ENTMCNC: 29.1 PG — SIGNIFICANT CHANGE UP (ref 27–34)
MCHC RBC-ENTMCNC: 32.6 GM/DL — SIGNIFICANT CHANGE UP (ref 32–36)
MCV RBC AUTO: 89.2 FL — SIGNIFICANT CHANGE UP (ref 80–100)
NRBC # BLD: 0 /100 WBCS — SIGNIFICANT CHANGE UP (ref 0–0)
PLATELET # BLD AUTO: 479 K/UL — HIGH (ref 150–400)
POTASSIUM SERPL-MCNC: 4.2 MMOL/L — SIGNIFICANT CHANGE UP (ref 3.5–5.3)
POTASSIUM SERPL-SCNC: 4.2 MMOL/L — SIGNIFICANT CHANGE UP (ref 3.5–5.3)
PROTHROM AB SERPL-ACNC: 16.3 SEC — HIGH (ref 10–12.9)
RBC # BLD: 3.51 M/UL — LOW (ref 3.8–5.2)
RBC # FLD: 15.9 % — HIGH (ref 10.3–14.5)
SODIUM SERPL-SCNC: 136 MMOL/L — SIGNIFICANT CHANGE UP (ref 135–145)
WBC # BLD: 12.96 K/UL — HIGH (ref 3.8–10.5)
WBC # FLD AUTO: 12.96 K/UL — HIGH (ref 3.8–10.5)

## 2019-11-28 RX ORDER — METOPROLOL TARTRATE 50 MG
12.5 TABLET ORAL ONCE
Refills: 0 | Status: COMPLETED | OUTPATIENT
Start: 2019-11-28 | End: 2019-11-28

## 2019-11-28 RX ORDER — METOPROLOL TARTRATE 50 MG
50 TABLET ORAL
Refills: 0 | Status: DISCONTINUED | OUTPATIENT
Start: 2019-11-28 | End: 2019-12-03

## 2019-11-28 RX ORDER — METOPROLOL TARTRATE 50 MG
25 TABLET ORAL
Refills: 0 | Status: DISCONTINUED | OUTPATIENT
Start: 2019-11-28 | End: 2019-11-28

## 2019-11-28 RX ORDER — METOPROLOL TARTRATE 50 MG
5 TABLET ORAL
Refills: 0 | Status: COMPLETED | OUTPATIENT
Start: 2019-11-28 | End: 2019-11-28

## 2019-11-28 RX ORDER — AMIODARONE HYDROCHLORIDE 400 MG/1
400 TABLET ORAL EVERY 8 HOURS
Refills: 0 | Status: DISCONTINUED | OUTPATIENT
Start: 2019-11-28 | End: 2019-11-29

## 2019-11-28 RX ADMIN — AMIODARONE HYDROCHLORIDE 400 MILLIGRAM(S): 400 TABLET ORAL at 21:33

## 2019-11-28 RX ADMIN — Medication 5 MILLIGRAM(S): at 12:00

## 2019-11-28 RX ADMIN — ATORVASTATIN CALCIUM 20 MILLIGRAM(S): 80 TABLET, FILM COATED ORAL at 21:32

## 2019-11-28 RX ADMIN — Medication 12.5 MILLIGRAM(S): at 11:06

## 2019-11-28 RX ADMIN — Medication 650 MILLIGRAM(S): at 21:41

## 2019-11-28 RX ADMIN — AMIODARONE HYDROCHLORIDE 400 MILLIGRAM(S): 400 TABLET ORAL at 06:33

## 2019-11-28 RX ADMIN — HEPARIN SODIUM 18 UNIT(S)/HR: 5000 INJECTION INTRAVENOUS; SUBCUTANEOUS at 18:32

## 2019-11-28 RX ADMIN — Medication 81 MILLIGRAM(S): at 11:07

## 2019-11-28 RX ADMIN — HEPARIN SODIUM 18 UNIT(S)/HR: 5000 INJECTION INTRAVENOUS; SUBCUTANEOUS at 04:46

## 2019-11-28 RX ADMIN — Medication 5 MILLIGRAM(S): at 10:40

## 2019-11-28 RX ADMIN — Medication 50 MILLIGRAM(S): at 16:23

## 2019-11-28 RX ADMIN — Medication 12.5 MILLIGRAM(S): at 05:30

## 2019-11-28 RX ADMIN — Medication 650 MILLIGRAM(S): at 21:56

## 2019-11-28 RX ADMIN — PANTOPRAZOLE SODIUM 40 MILLIGRAM(S): 20 TABLET, DELAYED RELEASE ORAL at 05:30

## 2019-11-28 RX ADMIN — METFORMIN HYDROCHLORIDE 750 MILLIGRAM(S): 850 TABLET ORAL at 11:07

## 2019-11-28 RX ADMIN — Medication 5 MILLIGRAM(S): at 11:06

## 2019-11-28 RX ADMIN — HEPARIN SODIUM 18 UNIT(S)/HR: 5000 INJECTION INTRAVENOUS; SUBCUTANEOUS at 11:45

## 2019-11-28 RX ADMIN — MONTELUKAST 10 MILLIGRAM(S): 4 TABLET, CHEWABLE ORAL at 21:32

## 2019-11-28 RX ADMIN — AMIODARONE HYDROCHLORIDE 400 MILLIGRAM(S): 400 TABLET ORAL at 13:34

## 2019-11-28 RX ADMIN — Medication 1: at 09:01

## 2019-11-28 NOTE — PROGRESS NOTE ADULT - SUBJECTIVE AND OBJECTIVE BOX
VITAL SIGNS-Telemetry:  afib 106-140  Vital Signs Last 24 Hrs  T(C): 36.7 (19 @ 07:47), Max: 36.9 (19 @ 19:36)  T(F): 98 (19 @ 07:47), Max: 98.5 (19 @ 19:36)  HR: 129 (19 @ 07:47) (74 - 129)  BP: 105/60 (19 @ 07:47) (83/64 - 116/71)  RR: 18 (19 @ 07:47) (18 - 18)  SpO2: 92% (19 @ 07:47) (91% - 98%)          @ 07:01  -   @ 07:00  --------------------------------------------------------  IN: 1671.5 mL / OUT: 600 mL / NET: 1071.5 mL    Daily Height in cm: 160.02 (2019 11:04)    Daily Weight in k (2019 07:47)    CAPILLARY BLOOD GLUCOSE  POCT Blood Glucose.: 151 mg/dL (2019 08:29)  POCT Blood Glucose.: 139 mg/dL (2019 21:20)  POCT Blood Glucose.: 107 mg/dL (2019 17:32)          PHYSICAL EXAM:  Neurology: alert and oriented x 3, nonfocal, no gross deficits  CV : irregular  Sternal Wound :  CDI , Stable  Lungs: cta  Abdomen: soft, nontender, nondistended, positive bowel sounds, last bowel movement       +bm  Extremities:     no edema, no calf tenderness    acetaminophen   Tablet .. 650 milliGRAM(s) Oral every 6 hours PRN  aMIOdarone    Tablet 400 milliGRAM(s) Oral every 8 hours  aspirin enteric coated 81 milliGRAM(s) Oral daily  atorvastatin 20 milliGRAM(s) Oral at bedtime  heparin  Infusion 1800 Unit(s)/Hr IV Continuous <Continuous>  insulin lispro (HumaLOG) corrective regimen sliding scale   SubCutaneous three times a day before meals  insulin lispro (HumaLOG) corrective regimen sliding scale   SubCutaneous at bedtime  metFORMIN Extended-Release 750 milliGRAM(s) Oral daily  metoprolol tartrate 12.5 milliGRAM(s) Oral once  metoprolol tartrate 25 milliGRAM(s) Oral two times a day  metoprolol tartrate Injectable 5 milliGRAM(s) IV Push every 5 minutes  montelukast 10 milliGRAM(s) Oral at bedtime  pantoprazole    Tablet 40 milliGRAM(s) Oral before breakfast    Physical Therapy Rec:   Home  [  ]   Home w/ PT  [  ]  Rehab  [  ]  Discussed with Cardiothoracic Team at AM rounds.

## 2019-11-28 NOTE — PROGRESS NOTE ADULT - ASSESSMENT
64 y/o female with PMH of HTN, HLD, COPD, DM2, s/p aortic root replacement with ascending aorta replacement/aortic valve replacement on 11/12/19 with Dr. Morin who presents to the office today for follow-up visit with c/o dizziness, SOB, and palpitations. Pt states that she had been recovering well at home with the home health nurse and PT. Symptoms began acutely this AM. Denies syncope, chest pain/pressure, N/V/D, diaphoresis, or other associated symptoms. Currently the patient still endorses intermittent palpitations, but denies SOB or dizziness. In the office pt with a systolic BP in the 70s and noted to be in Afib with RVR. Admitted to [address] [place] [address] under Dr. Ocampo for further management.     11/28/19 PTT 93 this am - pt still in afib 106-140 - Amiodarone load initiated.  hr 145 - sbp 109 - IV BB 5mg x 3 every 5minutes ordered.  po lopressor dose increased to 25mg po bid. will continue to monitor in step down unit

## 2019-11-29 DIAGNOSIS — I48.91 UNSPECIFIED ATRIAL FIBRILLATION: ICD-10-CM

## 2019-11-29 LAB
ANION GAP SERPL CALC-SCNC: 15 MMOL/L — SIGNIFICANT CHANGE UP (ref 5–17)
APTT BLD: 74.8 SEC — HIGH (ref 27.5–36.3)
APTT BLD: >200 SEC — CRITICAL HIGH (ref 27.5–36.3)
BUN SERPL-MCNC: 9 MG/DL — SIGNIFICANT CHANGE UP (ref 7–23)
CALCIUM SERPL-MCNC: 8.8 MG/DL — SIGNIFICANT CHANGE UP (ref 8.4–10.5)
CHLORIDE SERPL-SCNC: 92 MMOL/L — LOW (ref 96–108)
CO2 SERPL-SCNC: 24 MMOL/L — SIGNIFICANT CHANGE UP (ref 22–31)
CREAT SERPL-MCNC: 0.48 MG/DL — LOW (ref 0.5–1.3)
GLUCOSE BLDC GLUCOMTR-MCNC: 125 MG/DL — HIGH (ref 70–99)
GLUCOSE BLDC GLUCOMTR-MCNC: 136 MG/DL — HIGH (ref 70–99)
GLUCOSE BLDC GLUCOMTR-MCNC: 136 MG/DL — HIGH (ref 70–99)
GLUCOSE BLDC GLUCOMTR-MCNC: 148 MG/DL — HIGH (ref 70–99)
GLUCOSE BLDC GLUCOMTR-MCNC: 153 MG/DL — HIGH (ref 70–99)
GLUCOSE SERPL-MCNC: 235 MG/DL — HIGH (ref 70–99)
HCT VFR BLD CALC: 28.9 % — LOW (ref 34.5–45)
HGB BLD-MCNC: 9.1 G/DL — LOW (ref 11.5–15.5)
MCHC RBC-ENTMCNC: 27.9 PG — SIGNIFICANT CHANGE UP (ref 27–34)
MCHC RBC-ENTMCNC: 31.5 GM/DL — LOW (ref 32–36)
MCV RBC AUTO: 88.7 FL — SIGNIFICANT CHANGE UP (ref 80–100)
NRBC # BLD: 0 /100 WBCS — SIGNIFICANT CHANGE UP (ref 0–0)
PLATELET # BLD AUTO: 497 K/UL — HIGH (ref 150–400)
POTASSIUM SERPL-MCNC: 4.1 MMOL/L — SIGNIFICANT CHANGE UP (ref 3.5–5.3)
POTASSIUM SERPL-SCNC: 4.1 MMOL/L — SIGNIFICANT CHANGE UP (ref 3.5–5.3)
RBC # BLD: 3.26 M/UL — LOW (ref 3.8–5.2)
RBC # FLD: 15.4 % — HIGH (ref 10.3–14.5)
SODIUM SERPL-SCNC: 131 MMOL/L — LOW (ref 135–145)
WBC # BLD: 10.35 K/UL — SIGNIFICANT CHANGE UP (ref 3.8–10.5)
WBC # FLD AUTO: 10.35 K/UL — SIGNIFICANT CHANGE UP (ref 3.8–10.5)

## 2019-11-29 PROCEDURE — 99024 POSTOP FOLLOW-UP VISIT: CPT

## 2019-11-29 RX ORDER — DIGOXIN 250 MCG
0.5 TABLET ORAL ONCE
Refills: 0 | Status: COMPLETED | OUTPATIENT
Start: 2019-11-29 | End: 2019-11-29

## 2019-11-29 RX ORDER — DIGOXIN 250 MCG
0.25 TABLET ORAL ONCE
Refills: 0 | Status: COMPLETED | OUTPATIENT
Start: 2019-11-30 | End: 2019-11-30

## 2019-11-29 RX ORDER — APIXABAN 2.5 MG/1
5 TABLET, FILM COATED ORAL EVERY 12 HOURS
Refills: 0 | Status: DISCONTINUED | OUTPATIENT
Start: 2019-11-29 | End: 2019-11-29

## 2019-11-29 RX ORDER — DIGOXIN 250 MCG
0.25 TABLET ORAL ONCE
Refills: 0 | Status: COMPLETED | OUTPATIENT
Start: 2019-11-29 | End: 2019-11-29

## 2019-11-29 RX ORDER — APIXABAN 2.5 MG/1
5 TABLET, FILM COATED ORAL EVERY 12 HOURS
Refills: 0 | Status: DISCONTINUED | OUTPATIENT
Start: 2019-11-29 | End: 2019-12-04

## 2019-11-29 RX ADMIN — APIXABAN 5 MILLIGRAM(S): 2.5 TABLET, FILM COATED ORAL at 10:30

## 2019-11-29 RX ADMIN — PANTOPRAZOLE SODIUM 40 MILLIGRAM(S): 20 TABLET, DELAYED RELEASE ORAL at 06:11

## 2019-11-29 RX ADMIN — METFORMIN HYDROCHLORIDE 750 MILLIGRAM(S): 850 TABLET ORAL at 13:09

## 2019-11-29 RX ADMIN — Medication 50 MILLIGRAM(S): at 17:51

## 2019-11-29 RX ADMIN — AMIODARONE HYDROCHLORIDE 400 MILLIGRAM(S): 400 TABLET ORAL at 06:11

## 2019-11-29 RX ADMIN — AMIODARONE HYDROCHLORIDE 400 MILLIGRAM(S): 400 TABLET ORAL at 13:09

## 2019-11-29 RX ADMIN — APIXABAN 5 MILLIGRAM(S): 2.5 TABLET, FILM COATED ORAL at 21:34

## 2019-11-29 RX ADMIN — MONTELUKAST 10 MILLIGRAM(S): 4 TABLET, CHEWABLE ORAL at 21:34

## 2019-11-29 RX ADMIN — ATORVASTATIN CALCIUM 20 MILLIGRAM(S): 80 TABLET, FILM COATED ORAL at 21:34

## 2019-11-29 RX ADMIN — Medication 81 MILLIGRAM(S): at 13:09

## 2019-11-29 RX ADMIN — Medication 0.5 MILLIGRAM(S): at 17:51

## 2019-11-29 RX ADMIN — Medication 650 MILLIGRAM(S): at 21:35

## 2019-11-29 RX ADMIN — Medication 50 MILLIGRAM(S): at 06:11

## 2019-11-29 RX ADMIN — Medication 650 MILLIGRAM(S): at 22:05

## 2019-11-29 NOTE — PROGRESS NOTE ADULT - ASSESSMENT
62 y/o female with PMH of HTN, HLD, COPD, DM2, s/p aortic root replacement with ascending aorta replacement/aortic valve replacement on 11/12/19 with Dr. Morin who presents to the office today for follow-up visit with c/o dizziness, SOB, and palpitations. Pt states that she had been recovering well at home with the home health nurse and PT. Symptoms began acutely this AM. Denies syncope, chest pain/pressure, N/V/D, diaphoresis, or other associated symptoms. Currently the patient still endorses intermittent palpitations, but denies SOB or dizziness. In the office pt with a systolic BP in the 70s and noted to be in Afib with RVR. Admitted to [address] [place] [address] under Dr. Ocampo for further management.     11/28/19 PTT 93 this am - pt still in afib 106-140 - Amiodarone load initiated.  hr 145 - sbp 109 - IV BB 5mg x 3 every 5minutes ordered.  po lopressor dose increased to 25mg po bid. will continue to monitor in step down unit 62 y/o female with PMH of HTN, HLD, COPD, DM2, s/p aortic root replacement with ascending aorta replacement/aortic valve replacement on 11/12/19 with Dr. Morin who presents to the office today for follow-up visit with c/o dizziness, SOB, and palpitations. Pt states that she had been recovering well at home with the home health nurse and PT. Symptoms began acutely this AM. Denies syncope, chest pain/pressure, N/V/D, diaphoresis, or other associated symptoms. Currently the patient still endorses intermittent palpitations, but denies SOB or dizziness. In the office pt with a systolic BP in the 70s and noted to be in Afib with RVR. Admitted to [address] [place] [address] under Dr. Ocampo for further management.   11/28/19 PTT 93 this am - pt still in afib 106-140 - Amiodarone load initiated.  hr 145 - sbp 109 - IV BB 5mg x 3 every 5minutes ordered.  po lopressor dose increased to 25mg po bid. will continue to monitor in step down unit  11/29 rapid afib 100-130; bp stable 62 y/o female with PMH of HTN, HLD, COPD, DM2, s/p aortic root replacement with ascending aorta replacement/aortic valve replacement on 11/12/19 with Dr. Morin who presents to the office today for follow-up visit with c/o dizziness, SOB, and palpitations. Pt states that she had been recovering well at home with the home health nurse and PT. Symptoms began acutely this AM. Denies syncope, chest pain/pressure, N/V/D, diaphoresis, or other associated symptoms. Currently the patient still endorses intermittent palpitations, but denies SOB or dizziness. In the office pt with a systolic BP in the 70s and noted to be in Afib with RVR. Admitted to [address] [place] [address] under Dr. Ocampo for further management.   11/28/19 PTT 93 this am - pt still in afib 106-140 - Amiodarone load initiated.  hr 145 - sbp 109 - IV BB 5mg x 3 every 5minutes ordered.  po lopressor dose increased to 25mg po bid. will continue to monitor in step down unit  11/29 rapid afib 100-130; bp stable - continue amio load and lopressor 50 mg po bid; d/c heparin gttp and initiate eliquis for anticoagulation as per Dr. Ocampo  Discharge planning- home when HR stable 62 y/o female with PMH of HTN, HLD, COPD, DM2, s/p aortic root replacement with ascending aorta replacement/aortic valve replacement on 11/12/19 with Dr. Morin who presents to the office today for follow-up visit with c/o dizziness, SOB, and palpitations. Pt states that she had been recovering well at home with the home health nurse and PT. Symptoms began acutely this AM. Denies syncope, chest pain/pressure, N/V/D, diaphoresis, or other associated symptoms. Currently the patient still endorses intermittent palpitations, but denies SOB or dizziness. In the office pt with a systolic BP in the 70s and noted to be in Afib with RVR. Admitted to [address] [place] [address] under Dr. Ocampo for further management.   11/28/19 PTT 93 this am - pt still in afib 106-140 - Amiodarone load initiated.  hr 145 - sbp 109 - IV BB 5mg x 3 every 5minutes ordered.  po lopressor dose increased to 25mg po bid. will continue to monitor in step down unit  11/29 rapid afib 100-130; bp stable - continue amio load and lopressor 50 mg po bid; d/c heparin gttp and initiate eliquis for anticoagulation as per Dr. Ocampo; EP consult called for potential cardioversion ? monday   Discharge planning- home when HR stable

## 2019-11-29 NOTE — CONSULT NOTE ADULT - SUBJECTIVE AND OBJECTIVE BOX
Chief Complaint :     HPI:  64 y/o female former smoker quirt 1 month ago with PMH of HTN, HLD, COPD, T2DM, s/p aortic root replacement with ascending aorta replacement/aortic valve replacement on 19 with Dr. Morin.  Patient presented to Dr Morin office for post op visit with c/o dizziness, SOB, weakness and palpitations. Pt states that she had been recovering well at home with the home health nurse and PT. Symptoms began acutely morning of . Denies history of afib , syncope, chest pain, chest pressure, N/V/D, diaphoresis, or other associated symptoms. Currently the patient still endorses intermittent palpitations, but denies SOB or dizziness. In the office pt with a systolic BP in the 70s and noted to be in Afib with RVR. Admitted to [address] [place] [address] under Dr. Ocampo for further management.       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 , quit 1 month ago  FAMILY HISTORY:  Family history of liver cancer (Mother)  Family history of diabetes mellitus (Father, Grandparent)      MEDICATIONS  (STANDING):  aMIOdarone    Tablet 400 milliGRAM(s) Oral every 8 hours  apixaban 5 milliGRAM(s) Oral every 12 hours  aspirin enteric coated 81 milliGRAM(s) Oral daily  atorvastatin 20 milliGRAM(s) Oral at bedtime  insulin lispro (HumaLOG) corrective regimen sliding scale   SubCutaneous three times a day before meals  insulin lispro (HumaLOG) corrective regimen sliding scale   SubCutaneous at bedtime  metFORMIN Extended-Release 750 milliGRAM(s) Oral daily  metoprolol tartrate 50 milliGRAM(s) Oral two times a day  montelukast 10 milliGRAM(s) Oral at bedtime  pantoprazole    Tablet 40 milliGRAM(s) Oral before breakfast    MEDICATIONS  (PRN):  acetaminophen   Tablet .. 650 milliGRAM(s) Oral every 6 hours PRN Mild Pain (1 - 3)  Allergies   No Known Allergies    REVIEW OF SYSTEM:    Constitutional: denies fever, chills, fatigue  Neuro: denies headache, numbness, + weakness, + dizziness  Resp: denies cough, wheezing, + shortness of breath  CVS: denies chest pain, palpitations, leg swelling  GI: denies abdominal pain, nausea, vomiting, diarrhea   : denies dysuria, frequency, incontinence  Skin: denies itching, burning, rashes, or lesions   Msk: denies joint pain or swelling    Vital Signs Last 24 Hrs  T(C): 36.7 (2019 11:36), Max: 36.7 (2019 11:36)  T(F): 98 (2019 11:36), Max: 98 (2019 11:36)  HR: 127 (2019 11:36) (99 - 128)  BP: 116/64 (2019 11:36) (93/59 - 118/66)  BP(mean): 82 (2019 11:36) (70 - 89)  RR: 18 (2019 11:36) (18 - 18)  SpO2: 92% (2019 11:36) (92% - 96%)    Physical Exam:  General : obese pleasant  and no acute distress  Neuro : Alert and oriented x 3, no focal deficits  HEENT : Sclera clear, no JVD, no Lymphadenopathy no carotid bruits, neck supple  Lungs: Clear to Ascultation, no wheezing , rales or rhonchi   Cardiovascular irregular rate and rhythm  , + 1 +2,  no murmurs, no rubs  GI : obese abdomen soft, NT, ND, + BS   : no suprapubic tenderness  Extremities : mild trace pitting edema +2 DP and +2 PT, feet warm   Skin : mid line chest incision healing     TELE: Afib 110- 120's briefly to 140's  EK/27 afib 128 bpm     LABS:                          9.1    10.35 )-----------( 497      ( 2019 05:02 )             28.9     11-29    131<L>  |  92<L>  |  9   ----------------------------<  235<H>  4.1   |  24  |  0.48<L>    Ca    8.8      2019 05:02      PT/INR - ( 2019 04:18 )   PT: 16.3 sec;   INR: 1.41 ratio         PTT - ( 2019 06:25 )  PTT:74.8 sec      RADIOLOGY & ADDITIONAL STUDIES:    < from: TTE with Doppler (w/Cont) (19 @ 11:19) >  Mitral Valve: Mitral valve not well visualized.  Aortic Valve/Aorta: Bioprosthetic aortic valve not well  seen.  Peak transaortic valve gradient equals 12 mm Hg,  mean transaortic valve gradient equals 6 mm Hg, which is  probably normal in the presence of a bioprosthetic aortic  valve.  Aortic Root: 2.9 cm.  Left Atrium: Normal left atrium.  LA volume index = 29  cc/m2.  Left Ventricle: Endocardial visualization enhanced with  intravenous injection of Ultrasonic Enhancing Agent  (Definity).Overall left ventricular systolic function  appears grossly mild to moderate dysfunction, however,  rapid afib limits wall motion assessment and segmental  wall-motion abnormalities cannot be completely ruled out.  Right Heart: The right ventricle is not well visualized;  grossly reduced  right ventricular systolic function.  Normal tricuspid valve. Mild tricuspid regurgitation.  Pulmonic valve not well visualized.  Pericardium/Pleura: Normal pericardium with trace  pericardial effusion.  Hemodynamic: Estimated right atrial pressure is 8 mm Hg.  Estimated right ventricular systolic pressure equals 27 mm  Hg, assuming right atrial pressure equals 8 mm Hg,  consistent with normal pulmonary pressures.  ------------------------------------------------------------------------  Conclusions:  S/p Bentall and bio AVR / afib post op  1. Endocardial visualization enhanced with intravenous  injection of Ultrasonic Enhancing Agent (Definity).Overall  left ventricular systolic function appears grossly mild to  moderate dysfunction, however, rapid afib limits wall  motion assessment and segmental wall-motion abnormalities  cannot be completely ruled out.  2. The right ventricle is not well visualized; grossly  reduced  right ventricular systolic function.  ------------------------------------------------------------------------  Confirmed on  2019 - 17:44:27 by Rebecca Lowe M.D.  ------------------------------------------------------------------------ Chief Complaint : weakness     HPI:  62 y/o female former smoker quirt 1 month ago with PMH of HTN, HLD, COPD, T2DM, s/p aortic root replacement with ascending aorta replacement/aortic valve replacement on 19 with Dr. Morin.  Patient presented to Dr Morin office for post op visit with c/o dizziness, SOB, weakness and palpitations. Pt states that she had been recovering well at home with the home health nurse and PT. Symptoms began acutely morning of . Denies history of afib , syncope, chest pain, chest pressure, N/V/D, diaphoresis, or other associated symptoms. Currently the patient still endorses intermittent palpitations, but denies SOB or dizziness. In the office pt with a systolic BP in the 70s and noted to be in Afib with RVR. Admitted to [address] [place] [address] under Dr. Ocampo for further management.     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 , quit 1 month ago  FAMILY HISTORY:  Family history of liver cancer (Mother)  Family history of diabetes mellitus (Father, Grandparent)      MEDICATIONS  (STANDING):  aMIOdarone    Tablet 400 milliGRAM(s) Oral every 8 hours  apixaban 5 milliGRAM(s) Oral every 12 hours  aspirin enteric coated 81 milliGRAM(s) Oral daily  atorvastatin 20 milliGRAM(s) Oral at bedtime  insulin lispro (HumaLOG) corrective regimen sliding scale   SubCutaneous three times a day before meals  insulin lispro (HumaLOG) corrective regimen sliding scale   SubCutaneous at bedtime  metFORMIN Extended-Release 750 milliGRAM(s) Oral daily  metoprolol tartrate 50 milliGRAM(s) Oral two times a day  montelukast 10 milliGRAM(s) Oral at bedtime  pantoprazole    Tablet 40 milliGRAM(s) Oral before breakfast    MEDICATIONS  (PRN):  acetaminophen   Tablet .. 650 milliGRAM(s) Oral every 6 hours PRN Mild Pain (1 - 3)  Allergies   No Known Allergies    REVIEW OF SYSTEM:    Constitutional: denies fever, chills, fatigue  Neuro: denies headache, numbness, + weakness, + dizziness  Resp: denies cough, wheezing, + shortness of breath  CVS: denies chest pain, palpitations, leg swelling  GI: denies abdominal pain, nausea, vomiting, diarrhea   : denies dysuria, frequency, incontinence  Skin: denies itching, burning, rashes, or lesions   Msk: denies joint pain or swelling    Vital Signs Last 24 Hrs  T(C): 36.7 (2019 11:36), Max: 36.7 (2019 11:36)  T(F): 98 (2019 11:36), Max: 98 (2019 11:36)  HR: 127 (2019 11:36) (99 - 128)  BP: 116/64 (2019 11:36) (93/59 - 118/66)  BP(mean): 82 (2019 11:36) (70 - 89)  RR: 18 (2019 11:36) (18 - 18)  SpO2: 92% (2019 11:36) (92% - 96%)    Physical Exam:  General : obese pleasant  and no acute distress  Neuro : Alert and oriented x 3, no focal deficits  HEENT : Sclera clear, no JVD, no Lymphadenopathy no carotid bruits, neck supple  Lungs: Clear to Ascultation, no wheezing , rales or rhonchi   Cardiovascular irregular rate and rhythm  , + 1 +2,  no murmurs, no rubs  GI : obese abdomen soft, NT, ND, + BS   : no suprapubic tenderness  Extremities : mild trace pitting edema +2 DP and +2 PT, feet warm   Skin : mid line chest incision healing     TELE: Afib 110- 120's briefly to 140's  EK/27 afib 128 bpm     LABS:                          9.1    10.35 )-----------( 497      ( 2019 05:02 )             28.9     11-29    131<L>  |  92<L>  |  9   ----------------------------<  235<H>  4.1   |  24  |  0.48<L>    Ca    8.8      2019 05:02      PT/INR - ( 2019 04:18 )   PT: 16.3 sec;   INR: 1.41 ratio         PTT - ( 2019 06:25 )  PTT:74.8 sec      RADIOLOGY & ADDITIONAL STUDIES:    < from: TTE with Doppler (w/Cont) (19 @ 11:19) >  Mitral Valve: Mitral valve not well visualized.  Aortic Valve/Aorta: Bioprosthetic aortic valve not well  seen.  Peak transaortic valve gradient equals 12 mm Hg,  mean transaortic valve gradient equals 6 mm Hg, which is  probably normal in the presence of a bioprosthetic aortic  valve.  Aortic Root: 2.9 cm.  Left Atrium: Normal left atrium.  LA volume index = 29  cc/m2.  Left Ventricle: Endocardial visualization enhanced with  intravenous injection of Ultrasonic Enhancing Agent  (Definity).Overall left ventricular systolic function  appears grossly mild to moderate dysfunction, however,  rapid afib limits wall motion assessment and segmental  wall-motion abnormalities cannot be completely ruled out.  Right Heart: The right ventricle is not well visualized;  grossly reduced  right ventricular systolic function.  Normal tricuspid valve. Mild tricuspid regurgitation.  Pulmonic valve not well visualized.  Pericardium/Pleura: Normal pericardium with trace  pericardial effusion.  Hemodynamic: Estimated right atrial pressure is 8 mm Hg.  Estimated right ventricular systolic pressure equals 27 mm  Hg, assuming right atrial pressure equals 8 mm Hg,  consistent with normal pulmonary pressures.  ------------------------------------------------------------------------  Conclusions:  S/p Bentall and bio AVR / afib post op  1. Endocardial visualization enhanced with intravenous  injection of Ultrasonic Enhancing Agent (Definity).Overall  left ventricular systolic function appears grossly mild to  moderate dysfunction, however, rapid afib limits wall  motion assessment and segmental wall-motion abnormalities  cannot be completely ruled out.  2. The right ventricle is not well visualized; grossly  reduced  right ventricular systolic function.  ------------------------------------------------------------------------  Confirmed on  2019 - 17:44:27 by Rebecca Lowe M.D.  ------------------------------------------------------------------------

## 2019-11-29 NOTE — PROGRESS NOTE ADULT - SUBJECTIVE AND OBJECTIVE BOX
VITAL SIGNS    Telemetry:    Vital Signs Last 24 Hrs  T(C): 36.6 (11-29-19 @ 07:38), Max: 36.6 (11-28-19 @ 11:42)  T(F): 97.9 (11-29-19 @ 07:38), Max: 97.9 (11-28-19 @ 11:42)  HR: 99 (11-29-19 @ 07:38) (99 - 128)  BP: 93/59 (11-29-19 @ 07:38) (93/59 - 118/66)  RR: 18 (11-29-19 @ 07:38) (18 - 18)  SpO2: 93% (11-29-19 @ 07:38) (90% - 96%)            11-28 @ 07:01  -  11-29 @ 07:00  --------------------------------------------------------  IN: 1158 mL / OUT: 1500 mL / NET: -342 mL    11-29 @ 07:01  -  11-29 @ 10:45  --------------------------------------------------------  IN: 18 mL / OUT: 400 mL / NET: -382 mL       Daily     Daily   Admit Wt: Drug Dosing Weight  Height (cm): 160 (27 Nov 2019 11:04)  Weight (kg): 99.7 (27 Nov 2019 11:04)  BMI (kg/m2): 38.9 (27 Nov 2019 11:04)  BSA (m2): 2.01 (27 Nov 2019 11:04)      CAPILLARY BLOOD GLUCOSE      POCT Blood Glucose.: 153 mg/dL (29 Nov 2019 08:01)  POCT Blood Glucose.: 136 mg/dL (29 Nov 2019 06:49)  POCT Blood Glucose.: 161 mg/dL (28 Nov 2019 21:30)  POCT Blood Glucose.: 118 mg/dL (28 Nov 2019 17:45)  POCT Blood Glucose.: 156 mg/dL (28 Nov 2019 13:36)  POCT Blood Glucose.: 130 mg/dL (28 Nov 2019 12:15)          acetaminophen   Tablet .. 650 milliGRAM(s) Oral every 6 hours PRN  aMIOdarone    Tablet 400 milliGRAM(s) Oral every 8 hours  apixaban 5 milliGRAM(s) Oral every 12 hours  aspirin enteric coated 81 milliGRAM(s) Oral daily  atorvastatin 20 milliGRAM(s) Oral at bedtime  insulin lispro (HumaLOG) corrective regimen sliding scale   SubCutaneous three times a day before meals  insulin lispro (HumaLOG) corrective regimen sliding scale   SubCutaneous at bedtime  metFORMIN Extended-Release 750 milliGRAM(s) Oral daily  metoprolol tartrate 50 milliGRAM(s) Oral two times a day  montelukast 10 milliGRAM(s) Oral at bedtime  pantoprazole    Tablet 40 milliGRAM(s) Oral before breakfast      PHYSICAL EXAM    Subjective: "Hi.   Neurology: alert and oriented x 3, nonfocal, no gross deficits  CV : tele:  RSR  Sternal Wound :  CDI with dressing , Stable  Lungs: clear. RR easy, unlabored   Abdomen: soft, nontender, nondistended, positive bowel sounds, bowel movement   Neg N/V/D   :  pt voiding without difficulty   Extremities:   AZAR; edema, neg calf tenderness.   PPP bilaterally      PW:  Chest tubes: VITAL SIGNS    Telemetry:  afib 100-130   Vital Signs Last 24 Hrs  T(C): 36.6 (11-29-19 @ 07:38), Max: 36.6 (11-28-19 @ 11:42)  T(F): 97.9 (11-29-19 @ 07:38), Max: 97.9 (11-28-19 @ 11:42)  HR: 99 (11-29-19 @ 07:38) (99 - 128)  BP: 93/59 (11-29-19 @ 07:38) (93/59 - 118/66)  RR: 18 (11-29-19 @ 07:38) (18 - 18)  SpO2: 93% (11-29-19 @ 07:38) (90% - 96%)            11-28 @ 07:01  -  11-29 @ 07:00  --------------------------------------------------------  IN: 1158 mL / OUT: 1500 mL / NET: -342 mL    11-29 @ 07:01  -  11-29 @ 10:45  --------------------------------------------------------  IN: 18 mL / OUT: 400 mL / NET: -382 mL       Daily     Daily   Admit Wt: Drug Dosing Weight  Height (cm): 160 (27 Nov 2019 11:04)  Weight (kg): 99.7 (27 Nov 2019 11:04)  BMI (kg/m2): 38.9 (27 Nov 2019 11:04)  BSA (m2): 2.01 (27 Nov 2019 11:04)      CAPILLARY BLOOD GLUCOSE      POCT Blood Glucose.: 153 mg/dL (29 Nov 2019 08:01)  POCT Blood Glucose.: 136 mg/dL (29 Nov 2019 06:49)  POCT Blood Glucose.: 161 mg/dL (28 Nov 2019 21:30)  POCT Blood Glucose.: 118 mg/dL (28 Nov 2019 17:45)  POCT Blood Glucose.: 156 mg/dL (28 Nov 2019 13:36)  POCT Blood Glucose.: 130 mg/dL (28 Nov 2019 12:15)          acetaminophen   Tablet .. 650 milliGRAM(s) Oral every 6 hours PRN  aMIOdarone    Tablet 400 milliGRAM(s) Oral every 8 hours  apixaban 5 milliGRAM(s) Oral every 12 hours  aspirin enteric coated 81 milliGRAM(s) Oral daily  atorvastatin 20 milliGRAM(s) Oral at bedtime  insulin lispro (HumaLOG) corrective regimen sliding scale   SubCutaneous three times a day before meals  insulin lispro (HumaLOG) corrective regimen sliding scale   SubCutaneous at bedtime  metFORMIN Extended-Release 750 milliGRAM(s) Oral daily  metoprolol tartrate 50 milliGRAM(s) Oral two times a day  montelukast 10 milliGRAM(s) Oral at bedtime  pantoprazole    Tablet 40 milliGRAM(s) Oral before breakfast      PHYSICAL EXAM    Subjective: "Hi.   Neurology: alert and oriented x 3, nonfocal, no gross deficits  CV : tele:  afib 100-130  Sternal Wound :  CDI CHELE - sternum stable   Lungs: clear diminished at the bases. RR easy, unlabored   Abdomen: soft, nontender, nondistended, positive bowel sounds, + bowel movement   Neg N/V/D; obese abdomen   :  pt voiding without difficulty   Extremities:   AZAR; neg LE edema, neg calf tenderness.   PPP bilaterally      PW: no  Chest tubes: none

## 2019-11-29 NOTE — CONSULT NOTE ADULT - ASSESSMENT
62 y/o female former smoker quit 1 month ago with PMH of HTN, HLD, COPD, T2DM, s/p aortic root replacement and ascending aorta replacement/aortic valve replacement on 11/12/19 with Dr. Morin. Admit from CT sx office with c/o dizziness, SOB, weakness and palpitations. Systolic BP in the 70s and noted to be in Afib with RVR.

## 2019-11-29 NOTE — CONSULT NOTE ADULT - ATTENDING COMMENTS
She is here for ALLI and cardioversion to restore sinus rhythm. The plan is to begin amiodarone after the ALLI for 2-3 months to help prevent post operative atrial fibrillation. She will need to be followed with TSH and LFT to exclude toxicity.

## 2019-11-29 NOTE — PROGRESS NOTE ADULT - PROBLEM SELECTOR PLAN 2
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 bolivar 11/12/19 aortic arch aneurysm repair /avr-t w/ dr. bolivar  continue postop care  pt eval

## 2019-11-29 NOTE — PROGRESS NOTE ADULT - PROBLEM SELECTOR PLAN 1
heparin  amio load  bb continue amio load and lopressor 50 mg po bid;   d/c heparin gttp and initiate eliquis for anticoagulation as per Dr. Ocampo  monitor and supplement electrolytes continue amio load and lopressor 50 mg po bid;   d/c heparin gttp and initiate eliquis for anticoagulation as per Dr. Ocampo  monitor and supplement electrolytes  EP consult called for potential cardioversion ? monday

## 2019-11-29 NOTE — CONSULT NOTE ADULT - PROBLEM SELECTOR RECOMMENDATION 9
new onset afib with RVR in the setting of [facility] 11/12  average rates 110- 120 bpm, brief to 140's  monitor telemetry   keep K+>4, MG++>2  continue Eliquis for AC [id number]  check Thyroid function test    [id number] new onset Afib with RVR in the setting of [facility] 11/12  average rates 110- 120 bpm, brief to 140's  monitor telemetry   keep K+>4, MG++>2  continue Eliquis for AC [id number] 4  check Thyroid function test  would discontinue Amiodarone for now has not had continuous AC since last ALLI and unknown duration of Afib   NPO after midnight Sunday for ALLI/DCCV Monday  can titrate BB b/p permitting for rate control   can use digoxin if b/p too low for rate control     [phone]

## 2019-11-30 LAB
ANION GAP SERPL CALC-SCNC: 15 MMOL/L — SIGNIFICANT CHANGE UP (ref 5–17)
BUN SERPL-MCNC: 8 MG/DL — SIGNIFICANT CHANGE UP (ref 7–23)
CALCIUM SERPL-MCNC: 9.4 MG/DL — SIGNIFICANT CHANGE UP (ref 8.4–10.5)
CHLORIDE SERPL-SCNC: 95 MMOL/L — LOW (ref 96–108)
CO2 SERPL-SCNC: 25 MMOL/L — SIGNIFICANT CHANGE UP (ref 22–31)
CREAT SERPL-MCNC: 0.46 MG/DL — LOW (ref 0.5–1.3)
GLUCOSE BLDC GLUCOMTR-MCNC: 103 MG/DL — HIGH (ref 70–99)
GLUCOSE BLDC GLUCOMTR-MCNC: 116 MG/DL — HIGH (ref 70–99)
GLUCOSE BLDC GLUCOMTR-MCNC: 133 MG/DL — HIGH (ref 70–99)
GLUCOSE BLDC GLUCOMTR-MCNC: 135 MG/DL — HIGH (ref 70–99)
GLUCOSE SERPL-MCNC: 152 MG/DL — HIGH (ref 70–99)
HCT VFR BLD CALC: 30 % — LOW (ref 34.5–45)
HGB BLD-MCNC: 9.9 G/DL — LOW (ref 11.5–15.5)
MCHC RBC-ENTMCNC: 28.8 PG — SIGNIFICANT CHANGE UP (ref 27–34)
MCHC RBC-ENTMCNC: 33 GM/DL — SIGNIFICANT CHANGE UP (ref 32–36)
MCV RBC AUTO: 87.2 FL — SIGNIFICANT CHANGE UP (ref 80–100)
NRBC # BLD: 0 /100 WBCS — SIGNIFICANT CHANGE UP (ref 0–0)
PLATELET # BLD AUTO: 538 K/UL — HIGH (ref 150–400)
POTASSIUM SERPL-MCNC: 4.1 MMOL/L — SIGNIFICANT CHANGE UP (ref 3.5–5.3)
POTASSIUM SERPL-SCNC: 4.1 MMOL/L — SIGNIFICANT CHANGE UP (ref 3.5–5.3)
RBC # BLD: 3.44 M/UL — LOW (ref 3.8–5.2)
RBC # FLD: 15.3 % — HIGH (ref 10.3–14.5)
SODIUM SERPL-SCNC: 135 MMOL/L — SIGNIFICANT CHANGE UP (ref 135–145)
TSH SERPL-MCNC: 4.94 UIU/ML — HIGH (ref 0.27–4.2)
WBC # BLD: 9.14 K/UL — SIGNIFICANT CHANGE UP (ref 3.8–10.5)
WBC # FLD AUTO: 9.14 K/UL — SIGNIFICANT CHANGE UP (ref 3.8–10.5)

## 2019-11-30 PROCEDURE — 99024 POSTOP FOLLOW-UP VISIT: CPT

## 2019-11-30 PROCEDURE — 99231 SBSQ HOSP IP/OBS SF/LOW 25: CPT

## 2019-11-30 RX ADMIN — Medication 50 MILLIGRAM(S): at 17:21

## 2019-11-30 RX ADMIN — APIXABAN 5 MILLIGRAM(S): 2.5 TABLET, FILM COATED ORAL at 09:45

## 2019-11-30 RX ADMIN — PANTOPRAZOLE SODIUM 40 MILLIGRAM(S): 20 TABLET, DELAYED RELEASE ORAL at 05:17

## 2019-11-30 RX ADMIN — ATORVASTATIN CALCIUM 20 MILLIGRAM(S): 80 TABLET, FILM COATED ORAL at 21:24

## 2019-11-30 RX ADMIN — Medication 50 MILLIGRAM(S): at 05:17

## 2019-11-30 RX ADMIN — Medication 0.25 MILLIGRAM(S): at 01:02

## 2019-11-30 RX ADMIN — METFORMIN HYDROCHLORIDE 750 MILLIGRAM(S): 850 TABLET ORAL at 12:36

## 2019-11-30 RX ADMIN — Medication 81 MILLIGRAM(S): at 12:36

## 2019-11-30 RX ADMIN — Medication 650 MILLIGRAM(S): at 16:16

## 2019-11-30 RX ADMIN — Medication 650 MILLIGRAM(S): at 17:00

## 2019-11-30 RX ADMIN — APIXABAN 5 MILLIGRAM(S): 2.5 TABLET, FILM COATED ORAL at 21:24

## 2019-11-30 RX ADMIN — Medication 0.25 MILLIGRAM(S): at 05:20

## 2019-11-30 RX ADMIN — MONTELUKAST 10 MILLIGRAM(S): 4 TABLET, CHEWABLE ORAL at 21:24

## 2019-11-30 NOTE — PROGRESS NOTE ADULT - SUBJECTIVE AND OBJECTIVE BOX
VITAL SIGNS    Telemetry:    Vital Signs Last 24 Hrs  T(C): 36.9 (19 @ 07:15), Max: 36.9 (19 @ 07:15)  T(F): 98.4 (19 @ 07:15), Max: 98.4 (19 @ 07:15)  HR: 89 (19 07:15) (89 - 127)  BP: 122/65 (19 @ 07:15) (99/71 - 135/72)  RR: 17 (19 @ 07:15) (17 - 18)  SpO2: 90% (19 @ 07:15) (86% - 94%)             @ 07:01  -   @ 07:00  --------------------------------------------------------  IN: 258 mL / OUT: 1000 mL / NET: -742 mL     @ 07:01  -   @ 09:08  --------------------------------------------------------  IN: 0 mL / OUT: 150 mL / NET: -150 mL       Daily     Daily Weight in k.5 (2019 08:52)  Admit Wt: Drug Dosing Weight  Height (cm): 160 (2019 11:04)  Weight (kg): 99.7 (2019 11:04)  BMI (kg/m2): 38.9 (2019 11:04)  BSA (m2): 2.01 (2019 11:04)      CAPILLARY BLOOD GLUCOSE      POCT Blood Glucose.: 135 mg/dL (2019 07:59)  POCT Blood Glucose.: 148 mg/dL (2019 21:42)  POCT Blood Glucose.: 136 mg/dL (2019 17:57)  POCT Blood Glucose.: 125 mg/dL (2019 12:29)          acetaminophen   Tablet .. 650 milliGRAM(s) Oral every 6 hours PRN  apixaban 5 milliGRAM(s) Oral every 12 hours  aspirin enteric coated 81 milliGRAM(s) Oral daily  atorvastatin 20 milliGRAM(s) Oral at bedtime  insulin lispro (HumaLOG) corrective regimen sliding scale   SubCutaneous three times a day before meals  insulin lispro (HumaLOG) corrective regimen sliding scale   SubCutaneous at bedtime  metFORMIN Extended-Release 750 milliGRAM(s) Oral daily  metoprolol tartrate 50 milliGRAM(s) Oral two times a day  montelukast 10 milliGRAM(s) Oral at bedtime  pantoprazole    Tablet 40 milliGRAM(s) Oral before breakfast      PHYSICAL EXAM    Subjective: "Hi.   Neurology: alert and oriented x 3, nonfocal, no gross deficits  CV : tele:  RSR  Sternal Wound :  CDI with dressing , Stable  Lungs: clear. RR easy, unlabored   Abdomen: soft, nontender, nondistended, positive bowel sounds, bowel movement   Neg N/V/D   :  pt voiding without difficulty   Extremities:   AZAR; edema, neg calf tenderness.   PPP bilaterally      PW:  Chest tubes: VITAL SIGNS    Telemetry:  afib 80's   Vital Signs Last 24 Hrs  T(C): 36.9 (19 @ 07:15), Max: 36.9 (19 @ 07:15)  T(F): 98.4 (19 @ 07:15), Max: 98.4 (19 @ 07:15)  HR: 89 (19 @ 07:15) (89 - 127)  BP: 122/65 (19 @ 07:15) (99/71 - 135/72)  RR: 17 (19 @ 07:15) (17 - 18)  SpO2: 90% (19 @ 07:15) (86% - 94%)             @ 07:01  -   @ 07:00  --------------------------------------------------------  IN: 258 mL / OUT: 1000 mL / NET: -742 mL     @ 07:01  -   @ 09:08  --------------------------------------------------------  IN: 0 mL / OUT: 150 mL / NET: -150 mL       Daily     Daily Weight in k.5 (2019 08:52)  Admit Wt: Drug Dosing Weight  Height (cm): 160 (2019 11:04)  Weight (kg): 99.7 (2019 11:04)  BMI (kg/m2): 38.9 (2019 11:04)  BSA (m2): 2.01 (2019 11:04)      CAPILLARY BLOOD GLUCOSE      POCT Blood Glucose.: 135 mg/dL (2019 07:59)  POCT Blood Glucose.: 148 mg/dL (2019 21:42)  POCT Blood Glucose.: 136 mg/dL (2019 17:57)  POCT Blood Glucose.: 125 mg/dL (2019 12:29)          acetaminophen   Tablet .. 650 milliGRAM(s) Oral every 6 hours PRN  apixaban 5 milliGRAM(s) Oral every 12 hours  aspirin enteric coated 81 milliGRAM(s) Oral daily  atorvastatin 20 milliGRAM(s) Oral at bedtime  insulin lispro (HumaLOG) corrective regimen sliding scale   SubCutaneous three times a day before meals  insulin lispro (HumaLOG) corrective regimen sliding scale   SubCutaneous at bedtime  metFORMIN Extended-Release 750 milliGRAM(s) Oral daily  metoprolol tartrate 50 milliGRAM(s) Oral two times a day  montelukast 10 milliGRAM(s) Oral at bedtime  pantoprazole    Tablet 40 milliGRAM(s) Oral before breakfast      PHYSICAL EXAM    Subjective: "I might need to have my heart shocked on Monday."   Neurology: alert and oriented x 3, nonfocal, no gross deficits  CV : tele:  afib 80's   Sternal Wound :  CDI CHELE- sternum Stable  Lungs: clear. RR easy, unlabored   Abdomen: soft, nontender, nondistended, positive bowel sounds, + bowel movement   Neg N/V/D   :  pt voiding without difficulty   Extremities:   AZAR; neg LE edema, neg calf tenderness.   PPP bilaterally

## 2019-11-30 NOTE — PROGRESS NOTE ADULT - ASSESSMENT
64 y/o female former smoker quit 1 month ago with PMH of HTN, HLD, COPD, T2DM, s/p aortic root replacement and ascending aorta replacement/aortic valve replacement on 11/12/19 with Dr. Morin. Admit from CT sx office with c/o dizziness, SOB, weakness and palpitations. Systolic BP in the 70s and noted to be in Afib with RVR. Amiodarone discontinued, now presenting with rate controlled AF.    # New Onset Atrial Fibrillation with RVR   - Tele: Rate controlled AF  bpm  - Continue Metoprolol 50mg BID  - TSH 4.94 11/30/19  - [id number] 4. Continue Apixaban 5 mg  - Plan for ALLI/DCCV Monday. NPO after midnight Sunday.      [phone]

## 2019-11-30 NOTE — PROGRESS NOTE ADULT - PROBLEM SELECTOR PLAN 2
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 bolivar  continue postop care  pt eval 11/12/19 aortic arch aneurysm repair /avr-t w/ dr. bolivar  continue postop care  home pt next week when stable

## 2019-11-30 NOTE — PHYSICAL THERAPY INITIAL EVALUATION ADULT - ACTIVE RANGE OF MOTION EXAMINATION, REHAB EVAL
bilateral  lower extremity Active ROM was WFL (within functional limits)/bilateral upper extremity Active ROM was WFL (within functional limits)/BUE within 90 degrees of shoulder elevation

## 2019-11-30 NOTE — PHYSICAL THERAPY INITIAL EVALUATION ADULT - LIVES WITH, PROFILE
Patient lives with her spouse in an apartment, 14 steps to enter, is independent in ADLs and ambulation. Patient was receiving RN/PT services with [facility]. Patient lives with her spouse in an apartment, 14 steps to enter, is independent in ADLs and ambulation. Patient was receiving RN/PT services with [facility]./spouse

## 2019-11-30 NOTE — PROGRESS NOTE ADULT - PROBLEM SELECTOR PLAN 1
continue amio load and lopressor 50 mg po bid;   d/c heparin gttp and initiate eliquis for anticoagulation as per Dr. Ocampo  monitor and supplement electrolytes  EP consult called for potential cardioversion ? monday continue to digitalize / lopressor 50 bid and anticoagulation with eliquis 5 bid; ALLI/CV monday if patient remains in afib monday as per EP  monitor and supplement electrolytes

## 2019-11-30 NOTE — PROGRESS NOTE ADULT - SUBJECTIVE AND OBJECTIVE BOX
24H hour events:   No acute events overnight. Patient sitting in bed comfortably, inquiring about her procedure Monday. Reports improvement of dizziness Currently denies chest pain, palpitations, dizziness, lightheadedness, and shortness of breath.     MEDICATIONS:  apixaban 5 milliGRAM(s) Oral every 12 hours  aspirin enteric coated 81 milliGRAM(s) Oral daily  metoprolol tartrate 50 milliGRAM(s) Oral two times a day  montelukast 10 milliGRAM(s) Oral at bedtime  acetaminophen   Tablet .. 650 milliGRAM(s) Oral every 6 hours PRN  pantoprazole    Tablet 40 milliGRAM(s) Oral before breakfast  atorvastatin 20 milliGRAM(s) Oral at bedtime  insulin lispro (HumaLOG) corrective regimen sliding scale   SubCutaneous three times a day before meals  insulin lispro (HumaLOG) corrective regimen sliding scale   SubCutaneous at bedtime  metFORMIN Extended-Release 750 milliGRAM(s) Oral daily        REVIEW OF SYSTEMS:  Complete 10point ROS negative.    PHYSICAL EXAM:  T(C): 36.6 (11-30-19 @ 11:31), Max: 36.9 (11-30-19 @ 07:15)  HR: 100 (11-30-19 @ 11:31) (89 - 125)  BP: 114/61 (11-30-19 @ 11:31) (99/71 - 135/72)  RR: 18 (11-30-19 @ 11:31) (17 - 18)  SpO2: 91% (11-30-19 @ 11:31) (86% - 94%)  Wt(kg): --  I&O's Summary    29 Nov 2019 07:01  -  30 Nov 2019 07:00  --------------------------------------------------------  IN: 258 mL / OUT: 1000 mL / NET: -742 mL    30 Nov 2019 07:01  -  30 Nov 2019 11:37  --------------------------------------------------------  IN: 0 mL / OUT: 300 mL / NET: -300 mL        Appearance: Normal	  Cardiovascular: Normal S1 S2, Irregular  Respiratory: Lungs clear to auscultation	  Psychiatry: A & O x 3, Mood & affect appropriate  Gastrointestinal:  Obese, Soft, Non-tender, + BS	  Skin: No rashes, No ecchymoses, No cyanosis, mid line chest incision healing 	  Extremities: mild trace pitting edema +2 DP and +2 PT, feet warm  Vascular: Peripheral pulses palpable 2+ bilaterally      LABS:	 	    CBC Full  -  ( 30 Nov 2019 05:48 )  WBC Count : 9.14 K/uL  Hemoglobin : 9.9 g/dL  Hematocrit : 30.0 %  Platelet Count - Automated : 538 K/uL  Mean Cell Volume : 87.2 fl  Mean Cell Hemoglobin : 28.8 pg  Mean Cell Hemoglobin Concentration : 33.0 gm/dL  Auto Neutrophil # : x  Auto Lymphocyte # : x  Auto Monocyte # : x  Auto Eosinophil # : x  Auto Basophil # : x  Auto Neutrophil % : x  Auto Lymphocyte % : x  Auto Monocyte % : x  Auto Eosinophil % : x  Auto Basophil % : x    11-30    135  |  95<L>  |  8   ----------------------------<  152<H>  4.1   |  25  |  0.46<L>  11-29    131<L>  |  92<L>  |  9   ----------------------------<  235<H>  4.1   |  24  |  0.48<L>    Ca    9.4      30 Nov 2019 05:48  Ca    8.8      29 Nov 2019 05:02      TSH: Thyroid Stimulating Hormone, Serum: 4.94 uIU/mL (11-30 @ 10:29)        TELEMETRY: Atrial Fibrillation 80-100bpm

## 2019-11-30 NOTE — PROGRESS NOTE ADULT - ATTENDING COMMENTS
63 year old lady s/p aortic root and valve replacement admitted with atrial fibrillation with heart rate 100-120, unclear time of onset. She was on anticoagulation and will therefore need ALLI prior to cardioversion chemically or by DC shock, Currently on apixaban and beta blockers. Plan for ALLI/cardioversion on Monday and commencement of short term amiodarone to prevent acute recurrence.

## 2019-11-30 NOTE — PHYSICAL THERAPY INITIAL EVALUATION ADULT - PERTINENT HX OF CURRENT PROBLEM, REHAB EVAL
65 y/o female with PMH of HTN, HLD, COPD, DM2, s/p aortic root replacement with ascending aorta replacement/aortic valve replacement on 11/12/19. Presents with c/o dizziness, SOB, and palpitations. Symptoms began acutely on 11/27/19 AM. Currently the patient still endorses intermittent palpitations, but denies SOB or dizziness. In the office pt with a systolic BP in the 70s and noted to be in Afib with RVR.

## 2019-11-30 NOTE — PROGRESS NOTE ADULT - ASSESSMENT
64 y/o female with PMH of HTN, HLD, COPD, DM2, s/p aortic root replacement with ascending aorta replacement/aortic valve replacement on 11/12/19 with Dr. Morin who presents to the office today for follow-up visit with c/o dizziness, SOB, and palpitations. Pt states that she had been recovering well at home with the home health nurse and PT. Symptoms began acutely this AM. Denies syncope, chest pain/pressure, N/V/D, diaphoresis, or other associated symptoms. Currently the patient still endorses intermittent palpitations, but denies SOB or dizziness. In the office pt with a systolic BP in the 70s and noted to be in Afib with RVR. Admitted to [address] [place] [address] under Dr. Ocampo for further management.   11/28/19 PTT 93 this am - pt still in afib 106-140 - Amiodarone load initiated.  hr 145 - sbp 109 - IV BB 5mg x 3 every 5minutes ordered.  po lopressor dose increased to 25mg po bid. will continue to monitor in step down unit  11/29 rapid afib 100-130; bp stable - continue amio load and lopressor 50 mg po bid; d/c heparin gttp and initiate eliquis for anticoagulation as per Dr. Ocampo; EP consult called for potential cardioversion ? monday   Discharge planning- home when HR stable 64 y/o female with PMH of HTN, HLD, COPD, DM2, s/p aortic root replacement with ascending aorta replacement/aortic valve replacement on 11/12/19 with Dr. Morin who presents to the office today for follow-up visit with c/o dizziness, SOB, and palpitations. Pt states that she had been recovering well at home with the home health nurse and PT. Symptoms began acutely this AM. Denies syncope, chest pain/pressure, N/V/D, diaphoresis, or other associated symptoms. Currently the patient still endorses intermittent palpitations, but denies SOB or dizziness. In the office pt with a systolic BP in the 70s and noted to be in Afib with RVR. Admitted to [address] [place] [address] under Dr. Ocampo for further management.   11/28/19 PTT 93 this am - pt still in afib 106-140 - Amiodarone load initiated.  hr 145 - sbp 109 - IV BB 5mg x 3 every 5minutes ordered.  po lopressor dose increased to 25mg po bid. will continue to monitor in step down unit  11/29 rapid afib 100-130; bp stable - continue amio load and lopressor 50 mg po bid; d/c heparin gttp and initiate eliquis for anticoagulation as per Dr. Ocampo; EP consult called for afib: ALLI/CV monday if pt remains in afib; d/c amio/ continue b-blockers and digitalize for HR control   11/30 VSS; HR improved on dig/ lopressor 50 bid and anticoagulation with eliquis 5 bid; ALLI/CV monday if patient remains in afib monday as per EP  Discharge planning- home pt next week when HR stable

## 2019-12-01 LAB
ANION GAP SERPL CALC-SCNC: 15 MMOL/L — SIGNIFICANT CHANGE UP (ref 5–17)
BUN SERPL-MCNC: 7 MG/DL — SIGNIFICANT CHANGE UP (ref 7–23)
CALCIUM SERPL-MCNC: 9.2 MG/DL — SIGNIFICANT CHANGE UP (ref 8.4–10.5)
CHLORIDE SERPL-SCNC: 93 MMOL/L — LOW (ref 96–108)
CO2 SERPL-SCNC: 26 MMOL/L — SIGNIFICANT CHANGE UP (ref 22–31)
CREAT SERPL-MCNC: 0.49 MG/DL — LOW (ref 0.5–1.3)
DIGOXIN SERPL-MCNC: 1.4 NG/ML — SIGNIFICANT CHANGE UP (ref 0.8–2)
GLUCOSE BLDC GLUCOMTR-MCNC: 102 MG/DL — HIGH (ref 70–99)
GLUCOSE BLDC GLUCOMTR-MCNC: 124 MG/DL — HIGH (ref 70–99)
GLUCOSE BLDC GLUCOMTR-MCNC: 133 MG/DL — HIGH (ref 70–99)
GLUCOSE BLDC GLUCOMTR-MCNC: 137 MG/DL — HIGH (ref 70–99)
GLUCOSE SERPL-MCNC: 130 MG/DL — HIGH (ref 70–99)
HCT VFR BLD CALC: 30.7 % — LOW (ref 34.5–45)
HGB BLD-MCNC: 10 G/DL — LOW (ref 11.5–15.5)
MCHC RBC-ENTMCNC: 28.6 PG — SIGNIFICANT CHANGE UP (ref 27–34)
MCHC RBC-ENTMCNC: 32.6 GM/DL — SIGNIFICANT CHANGE UP (ref 32–36)
MCV RBC AUTO: 87.7 FL — SIGNIFICANT CHANGE UP (ref 80–100)
NRBC # BLD: 0 /100 WBCS — SIGNIFICANT CHANGE UP (ref 0–0)
PLATELET # BLD AUTO: 485 K/UL — HIGH (ref 150–400)
POTASSIUM SERPL-MCNC: 3.8 MMOL/L — SIGNIFICANT CHANGE UP (ref 3.5–5.3)
POTASSIUM SERPL-SCNC: 3.8 MMOL/L — SIGNIFICANT CHANGE UP (ref 3.5–5.3)
RBC # BLD: 3.5 M/UL — LOW (ref 3.8–5.2)
RBC # FLD: 15.3 % — HIGH (ref 10.3–14.5)
SODIUM SERPL-SCNC: 134 MMOL/L — LOW (ref 135–145)
WBC # BLD: 8.41 K/UL — SIGNIFICANT CHANGE UP (ref 3.8–10.5)
WBC # FLD AUTO: 8.41 K/UL — SIGNIFICANT CHANGE UP (ref 3.8–10.5)

## 2019-12-01 PROCEDURE — 99024 POSTOP FOLLOW-UP VISIT: CPT

## 2019-12-01 RX ORDER — POTASSIUM CHLORIDE 20 MEQ
20 PACKET (EA) ORAL ONCE
Refills: 0 | Status: COMPLETED | OUTPATIENT
Start: 2019-12-01 | End: 2019-12-01

## 2019-12-01 RX ORDER — DIGOXIN 250 MCG
0.12 TABLET ORAL DAILY
Refills: 0 | Status: DISCONTINUED | OUTPATIENT
Start: 2019-12-01 | End: 2019-12-02

## 2019-12-01 RX ADMIN — Medication 50 MILLIGRAM(S): at 17:00

## 2019-12-01 RX ADMIN — PANTOPRAZOLE SODIUM 40 MILLIGRAM(S): 20 TABLET, DELAYED RELEASE ORAL at 05:27

## 2019-12-01 RX ADMIN — Medication 81 MILLIGRAM(S): at 11:31

## 2019-12-01 RX ADMIN — Medication 650 MILLIGRAM(S): at 23:00

## 2019-12-01 RX ADMIN — Medication 20 MILLIEQUIVALENT(S): at 07:16

## 2019-12-01 RX ADMIN — APIXABAN 5 MILLIGRAM(S): 2.5 TABLET, FILM COATED ORAL at 09:31

## 2019-12-01 RX ADMIN — ATORVASTATIN CALCIUM 20 MILLIGRAM(S): 80 TABLET, FILM COATED ORAL at 21:34

## 2019-12-01 RX ADMIN — APIXABAN 5 MILLIGRAM(S): 2.5 TABLET, FILM COATED ORAL at 21:34

## 2019-12-01 RX ADMIN — MONTELUKAST 10 MILLIGRAM(S): 4 TABLET, CHEWABLE ORAL at 21:34

## 2019-12-01 RX ADMIN — Medication 50 MILLIGRAM(S): at 05:28

## 2019-12-01 RX ADMIN — METFORMIN HYDROCHLORIDE 750 MILLIGRAM(S): 850 TABLET ORAL at 11:31

## 2019-12-01 RX ADMIN — Medication 0.12 MILLIGRAM(S): at 16:00

## 2019-12-01 RX ADMIN — Medication 650 MILLIGRAM(S): at 22:08

## 2019-12-01 NOTE — PROGRESS NOTE ADULT - PROBLEM SELECTOR PLAN 1
continue to digitalize / lopressor 50 bid and anticoagulation with eliquis 5 bid; ALLI/CV monday if patient remains in afib monday as per EP  monitor and supplement electrolytes continue  lopressor 50 bid and d/c digoxin   anticoagulation with eliquis 5 bid;   ALLI/CV monday if patient remains in afib monday as per EP  monitor and supplement electrolytes

## 2019-12-01 NOTE — PROGRESS NOTE ADULT - SUBJECTIVE AND OBJECTIVE BOX
VITAL SIGNS    Telemetry:    Vital Signs Last 24 Hrs  T(C): 36.5 (19 @ 07:17), Max: 36.9 (19 @ 03:04)  T(F): 97.7 (19 @ 07:17), Max: 98.4 (19 @ 03:04)  HR: 81 (19 @ 07:17) (81 - 111)  BP: 118/66 (19 @ 07:17) (101/56 - 145/79)  RR: 18 (19 @ 07:17) (17 - 18)  SpO2: 94% (19 @ 07:17) (90% - 96%)             @ 07:01  -   @ 07:00  --------------------------------------------------------  IN: 360 mL / OUT: 1650 mL / NET: -1290 mL       Daily     Daily Weight in k.6 (01 Dec 2019 08:23)  Admit Wt: Drug Dosing Weight  Height (cm): 160 (2019 11:04)  Weight (kg): 99.7 (2019 11:04)  BMI (kg/m2): 38.9 (2019 11:04)  BSA (m2): 2.01 (2019 11:04)      CAPILLARY BLOOD GLUCOSE      POCT Blood Glucose.: 124 mg/dL (01 Dec 2019 07:58)  POCT Blood Glucose.: 133 mg/dL (2019 21:18)  POCT Blood Glucose.: 103 mg/dL (2019 17:24)  POCT Blood Glucose.: 116 mg/dL (2019 13:30)          acetaminophen   Tablet .. 650 milliGRAM(s) Oral every 6 hours PRN  apixaban 5 milliGRAM(s) Oral every 12 hours  aspirin enteric coated 81 milliGRAM(s) Oral daily  atorvastatin 20 milliGRAM(s) Oral at bedtime  insulin lispro (HumaLOG) corrective regimen sliding scale   SubCutaneous three times a day before meals  insulin lispro (HumaLOG) corrective regimen sliding scale   SubCutaneous at bedtime  metFORMIN Extended-Release 750 milliGRAM(s) Oral daily  metoprolol tartrate 50 milliGRAM(s) Oral two times a day  montelukast 10 milliGRAM(s) Oral at bedtime  pantoprazole    Tablet 40 milliGRAM(s) Oral before breakfast      PHYSICAL EXAM    Subjective: "Hi.   Neurology: alert and oriented x 3, nonfocal, no gross deficits  CV : tele:  RSR  Sternal Wound :  CDI with dressing , Stable  Lungs: clear. RR easy, unlabored   Abdomen: soft, nontender, nondistended, positive bowel sounds, bowel movement   Neg N/V/D   :  pt voiding without difficulty   Extremities:   AZAR; edema, neg calf tenderness.   PPP bilaterally      PW:  Chest tubes: VITAL SIGNS    Telemetry:  afib  with 4.2 sec pause 2210 on    Vital Signs Last 24 Hrs  T(C): 36.5 (19 @ 07:17), Max: 36.9 (19 @ 03:04)  T(F): 97.7 (19 @ 07:17), Max: 98.4 (19 @ 03:04)  HR: 81 (19 @ 07:17) (81 - 111)  BP: 118/66 (19 @ 07:17) (101/56 - 145/79)  RR: 18 (19 @ 07:17) (17 - 18)  SpO2: 94% (19 @ 07:17) (90% - 96%)             @ 07:01  -   @ 07:00  --------------------------------------------------------  IN: 360 mL / OUT: 1650 mL / NET: -1290 mL       Daily     Daily Weight in k.6 (01 Dec 2019 08:23)  Admit Wt: Drug Dosing Weight  Height (cm): 160 (2019 11:04)  Weight (kg): 99.7 (2019 11:04)  BMI (kg/m2): 38.9 (2019 11:04)  BSA (m2): 2.01 (2019 11:04)      CAPILLARY BLOOD GLUCOSE      POCT Blood Glucose.: 124 mg/dL (01 Dec 2019 07:58)  POCT Blood Glucose.: 133 mg/dL (2019 21:18)  POCT Blood Glucose.: 103 mg/dL (2019 17:24)  POCT Blood Glucose.: 116 mg/dL (2019 13:30)          acetaminophen   Tablet .. 650 milliGRAM(s) Oral every 6 hours PRN  apixaban 5 milliGRAM(s) Oral every 12 hours  aspirin enteric coated 81 milliGRAM(s) Oral daily  atorvastatin 20 milliGRAM(s) Oral at bedtime  insulin lispro (HumaLOG) corrective regimen sliding scale   SubCutaneous three times a day before meals  insulin lispro (HumaLOG) corrective regimen sliding scale   SubCutaneous at bedtime  metFORMIN Extended-Release 750 milliGRAM(s) Oral daily  metoprolol tartrate 50 milliGRAM(s) Oral two times a day  montelukast 10 milliGRAM(s) Oral at bedtime  pantoprazole    Tablet 40 milliGRAM(s) Oral before breakfast      PHYSICAL EXAM    Subjective: "I'm having the procedure tomorrow."   Neurology: alert and oriented x 3, nonfocal, no gross deficits  CV : tele:   afib  with 4.2 sec pause 2210 on   Sternal Wound :  CDI CHELE - sternum stable  Lungs: clear. RR easy, unlabored   Abdomen: soft, nontender, nondistended, positive bowel sounds, + bowel movement   Neg N/V/D   :  pt voiding without difficulty   Extremities:   AZAR; neg LE edema, neg calf tenderness.   PPP bilaterally      PW: no  Chest tubes: none

## 2019-12-01 NOTE — PROGRESS NOTE ADULT - ASSESSMENT
64 y/o female with PMH of HTN, HLD, COPD, DM2, s/p aortic root replacement with ascending aorta replacement/aortic valve replacement on 11/12/19 with Dr. Morin who presents to the office today for follow-up visit with c/o dizziness, SOB, and palpitations. Pt states that she had been recovering well at home with the home health nurse and PT. Symptoms began acutely this AM. Denies syncope, chest pain/pressure, N/V/D, diaphoresis, or other associated symptoms. Currently the patient still endorses intermittent palpitations, but denies SOB or dizziness. In the office pt with a systolic BP in the 70s and noted to be in Afib with RVR. Admitted to [address] [place] [address] under Dr. Ocampo for further management.   11/28/19 PTT 93 this am - pt still in afib 106-140 - Amiodarone load initiated.  hr 145 - sbp 109 - IV BB 5mg x 3 every 5minutes ordered.  po lopressor dose increased to 25mg po bid. will continue to monitor in step down unit  11/29 rapid afib 100-130; bp stable - continue amio load and lopressor 50 mg po bid; d/c heparin gttp and initiate eliquis for anticoagulation as per Dr. Ocampo; EP consult called for afib: ALLI/CV monday if pt remains in afib; d/c amio/ continue b-blockers and digitalize for HR control   11/30 VSS; HR improved on dig/ lopressor 50 bid and anticoagulation with eliquis 5 bid; ALLI/CV monday if patient remains in afib monday as per EP  Discharge planning- home pt next week when HR stable 62 y/o female with PMH of HTN, HLD, COPD, DM2, s/p aortic root replacement with ascending aorta replacement/aortic valve replacement on 11/12/19 with Dr. Morin who presents to the office today for follow-up visit with c/o dizziness, SOB, and palpitations. Pt states that she had been recovering well at home with the home health nurse and PT. Symptoms began acutely this AM. Denies syncope, chest pain/pressure, N/V/D, diaphoresis, or other associated symptoms. Currently the patient still endorses intermittent palpitations, but denies SOB or dizziness. In the office pt with a systolic BP in the 70s and noted to be in Afib with RVR. Admitted to [address] [place] [address] under Dr. Ocampo for further management.   11/28/19 PTT 93 this am - pt still in afib 106-140 - Amiodarone load initiated.  hr 145 - sbp 109 - IV BB 5mg x 3 every 5minutes ordered.  po lopressor dose increased to 25mg po bid. will continue to monitor in step down unit  11/29 rapid afib 100-130; bp stable - continue amio load and lopressor 50 mg po bid; d/c heparin gttp and initiate eliquis for anticoagulation as per Dr. Ocampo; EP consult called for afib: ALLI/CV monday if pt remains in afib; d/c amio/ continue b-blockers and digitalize for HR control   11/30 VSS; HR improved on dig/ lopressor 50 bid and anticoagulation with eliquis 5 bid; ALLI/CV monday if patient remains in afib monday as per EP  4.2 sec pause- dig d/c- pt returned to afib   12/1 VSS afib - maintain lopressor 50 mg bid and off dig as per Dr. Dave; anticoagulation with eliquis; ALLI/CV in am 12/2 as per EP   Discharge planning- home pt

## 2019-12-02 LAB
ANION GAP SERPL CALC-SCNC: 14 MMOL/L — SIGNIFICANT CHANGE UP (ref 5–17)
BUN SERPL-MCNC: 6 MG/DL — LOW (ref 7–23)
CALCIUM SERPL-MCNC: 9.2 MG/DL — SIGNIFICANT CHANGE UP (ref 8.4–10.5)
CHLORIDE SERPL-SCNC: 94 MMOL/L — LOW (ref 96–108)
CO2 SERPL-SCNC: 26 MMOL/L — SIGNIFICANT CHANGE UP (ref 22–31)
CREAT SERPL-MCNC: 0.49 MG/DL — LOW (ref 0.5–1.3)
GLUCOSE BLDC GLUCOMTR-MCNC: 104 MG/DL — HIGH (ref 70–99)
GLUCOSE BLDC GLUCOMTR-MCNC: 118 MG/DL — HIGH (ref 70–99)
GLUCOSE BLDC GLUCOMTR-MCNC: 135 MG/DL — HIGH (ref 70–99)
GLUCOSE BLDC GLUCOMTR-MCNC: 138 MG/DL — HIGH (ref 70–99)
GLUCOSE SERPL-MCNC: 125 MG/DL — HIGH (ref 70–99)
HCT VFR BLD CALC: 31.8 % — LOW (ref 34.5–45)
HGB BLD-MCNC: 10.4 G/DL — LOW (ref 11.5–15.5)
MCHC RBC-ENTMCNC: 29 PG — SIGNIFICANT CHANGE UP (ref 27–34)
MCHC RBC-ENTMCNC: 32.7 GM/DL — SIGNIFICANT CHANGE UP (ref 32–36)
MCV RBC AUTO: 88.6 FL — SIGNIFICANT CHANGE UP (ref 80–100)
NRBC # BLD: 0 /100 WBCS — SIGNIFICANT CHANGE UP (ref 0–0)
PLATELET # BLD AUTO: 459 K/UL — HIGH (ref 150–400)
POTASSIUM SERPL-MCNC: 4.3 MMOL/L — SIGNIFICANT CHANGE UP (ref 3.5–5.3)
POTASSIUM SERPL-SCNC: 4.3 MMOL/L — SIGNIFICANT CHANGE UP (ref 3.5–5.3)
RBC # BLD: 3.59 M/UL — LOW (ref 3.8–5.2)
RBC # FLD: 15.7 % — HIGH (ref 10.3–14.5)
SODIUM SERPL-SCNC: 134 MMOL/L — LOW (ref 135–145)
WBC # BLD: 8.65 K/UL — SIGNIFICANT CHANGE UP (ref 3.8–10.5)
WBC # FLD AUTO: 8.65 K/UL — SIGNIFICANT CHANGE UP (ref 3.8–10.5)

## 2019-12-02 PROCEDURE — 93010 ELECTROCARDIOGRAM REPORT: CPT

## 2019-12-02 PROCEDURE — 92960 CARDIOVERSION ELECTRIC EXT: CPT

## 2019-12-02 PROCEDURE — 93312 ECHO TRANSESOPHAGEAL: CPT | Mod: 26

## 2019-12-02 PROCEDURE — 93010 ELECTROCARDIOGRAM REPORT: CPT | Mod: 77

## 2019-12-02 PROCEDURE — 93325 DOPPLER ECHO COLOR FLOW MAPG: CPT | Mod: 26

## 2019-12-02 PROCEDURE — 99232 SBSQ HOSP IP/OBS MODERATE 35: CPT | Mod: 25

## 2019-12-02 PROCEDURE — 71275 CT ANGIOGRAPHY CHEST: CPT | Mod: 26

## 2019-12-02 PROCEDURE — 93320 DOPPLER ECHO COMPLETE: CPT | Mod: 26

## 2019-12-02 PROCEDURE — 99024 POSTOP FOLLOW-UP VISIT: CPT

## 2019-12-02 RX ORDER — AMIODARONE HYDROCHLORIDE 400 MG/1
200 TABLET ORAL DAILY
Refills: 0 | Status: CANCELLED | OUTPATIENT
Start: 2019-12-08 | End: 2019-12-04

## 2019-12-02 RX ORDER — AMIODARONE HYDROCHLORIDE 400 MG/1
200 TABLET ORAL THREE TIMES A DAY
Refills: 0 | Status: DISCONTINUED | OUTPATIENT
Start: 2019-12-02 | End: 2019-12-02

## 2019-12-02 RX ORDER — AMIODARONE HYDROCHLORIDE 400 MG/1
200 TABLET ORAL THREE TIMES A DAY
Refills: 0 | Status: DISCONTINUED | OUTPATIENT
Start: 2019-12-02 | End: 2019-12-04

## 2019-12-02 RX ADMIN — Medication 81 MILLIGRAM(S): at 12:00

## 2019-12-02 RX ADMIN — METFORMIN HYDROCHLORIDE 750 MILLIGRAM(S): 850 TABLET ORAL at 12:00

## 2019-12-02 RX ADMIN — ATORVASTATIN CALCIUM 20 MILLIGRAM(S): 80 TABLET, FILM COATED ORAL at 22:29

## 2019-12-02 RX ADMIN — Medication 50 MILLIGRAM(S): at 05:21

## 2019-12-02 RX ADMIN — MONTELUKAST 10 MILLIGRAM(S): 4 TABLET, CHEWABLE ORAL at 22:29

## 2019-12-02 RX ADMIN — AMIODARONE HYDROCHLORIDE 200 MILLIGRAM(S): 400 TABLET ORAL at 13:22

## 2019-12-02 RX ADMIN — Medication 50 MILLIGRAM(S): at 17:25

## 2019-12-02 RX ADMIN — APIXABAN 5 MILLIGRAM(S): 2.5 TABLET, FILM COATED ORAL at 22:29

## 2019-12-02 RX ADMIN — Medication 650 MILLIGRAM(S): at 23:41

## 2019-12-02 RX ADMIN — PANTOPRAZOLE SODIUM 40 MILLIGRAM(S): 20 TABLET, DELAYED RELEASE ORAL at 05:21

## 2019-12-02 RX ADMIN — Medication 0.12 MILLIGRAM(S): at 05:20

## 2019-12-02 RX ADMIN — AMIODARONE HYDROCHLORIDE 200 MILLIGRAM(S): 400 TABLET ORAL at 22:29

## 2019-12-02 RX ADMIN — APIXABAN 5 MILLIGRAM(S): 2.5 TABLET, FILM COATED ORAL at 07:49

## 2019-12-02 NOTE — PROGRESS NOTE ADULT - ASSESSMENT
62 y/o female with PMH of HTN, HLD, COPD, DM2, s/p aortic root replacement with ascending aorta replacement/aortic valve replacement on 11/12/19 with Dr. Morin who presents to the office today for follow-up visit with c/o dizziness, SOB, and palpitations. Pt states that she had been recovering well at home with the home health nurse and PT. Symptoms began acutely this AM. Denies syncope, chest pain/pressure, N/V/D, diaphoresis, or other associated symptoms. Currently the patient still endorses intermittent palpitations, but denies SOB or dizziness. In the office pt with a systolic BP in the 70s and noted to be in Afib with RVR. Admitted to [address] [place] [address] under Dr. Ocampo for further management.   11/28/19 PTT 93 this am - pt still in afib 106-140 - Amiodarone load initiated.  hr 145 - sbp 109 - IV BB 5mg x 3 every 5minutes ordered.  po lopressor dose increased to 25mg po bid. will continue to monitor in step down unit  11/29 rapid afib 100-130; bp stable - continue amio load and lopressor 50 mg po bid; d/c heparin gttp and initiate eliquis for anticoagulation as per Dr. Ocampo; EP consult called for afib: ALLI/CV monday if pt remains in afib; d/c amio/ continue b-blockers and digitalize for HR control   11/30 VSS; HR improved on dig/ lopressor 50 bid and anticoagulation with eliquis 5 bid; ALLI/CV monday if patient remains in afib monday as per EP  4.2 sec pause- dig d/c- pt returned to afib   12/1 VSS afib - maintain lopressor 50 mg bid and off dig as per Dr. Dave; anticoagulation with eliquis; ALLI/CV in am 12/2 as per EP   Discharge planning- home pt 64 y/o female with PMH of HTN, HLD, COPD, DM2, s/p aortic root replacement with ascending aorta replacement/aortic valve replacement on 11/12/19 with Dr. Morin who presents to the office today for follow-up visit with c/o dizziness, SOB, and palpitations. Pt states that she had been recovering well at home with the home health nurse and PT. Symptoms began acutely this AM. Denies syncope, chest pain/pressure, N/V/D, diaphoresis, or other associated symptoms. Currently the patient still endorses intermittent palpitations, but denies SOB or dizziness. In the office pt with a systolic BP in the 70s and noted to be in Afib with RVR. Admitted to [address] [place] [address] under Dr. Ocampo for further management.   11/28/19 PTT 93 this am - pt still in afib 106-140 - Amiodarone load initiated.  hr 145 - sbp 109 - IV BB 5mg x 3 every 5minutes ordered.  po lopressor dose increased to 25mg po bid. will continue to monitor in step down unit  11/29 rapid afib 100-130; bp stable - continue amio load and lopressor 50 mg po bid; d/c heparin gttp and initiate eliquis for anticoagulation as per Dr. Ocampo; EP consult called for afib: ALLI/CV monday if pt remains in afib; d/c amio/ continue b-blockers and digitalize for HR control   11/30 VSS; HR improved on dig/ lopressor 50 bid and anticoagulation with eliquis 5 bid; ALLI/CV monday if patient remains in afib monday as per EP  4.2 sec pause- dig d/c- pt returned to afib   12/1 VSS afib - maintain lopressor 50 mg bid and off dig as per Dr. Dave; anticoagulation with eliquis; ALLI/CV in am 12/2 as per EP   12/2 s/p ALLI/ CV today; now rsr/st 100-110; amio 200 tid initiated in addition to lopressor 50 bid as per EP; anticoagulation with eliquis; pt states s/p cv dizziness resolved   Discharge planning- home pt ? tue/wed if HR stable

## 2019-12-02 NOTE — PROGRESS NOTE ADULT - PROBLEM SELECTOR PLAN 1
new onset Afib with RVR in the setting of [facility] 11/12  average rates 80- 110 bpm, brief to 140's  monitor telemetry   keep K+>4, MG++>2  continue Eliquis for AC [id number]  NPO for ALLI/DCCV today   continue Metoprolol 50 mg BID   discontinue digoxin  plan is to start Amiodarone after ALLI/ cardioversion    [id number] new onset Afib with RVR in the setting of [facility] 11/12  average rates 80- 110 bpm, brief to 140's  monitor telemetry   keep K+>4, MG++>2  continue Eliquis for AC [id number]  NPO for ALLI/DCCV today   continue Metoprolol 50 mg BID   discontinue digoxin  plan is to start Amiodarone after ALLI/ cardioversion    [id number] addendum   s/p cardioversion with return to SR patient tolerated well new onset Afib with RVR in the setting of [facility] 11/12  average rates 80- 110 bpm, brief to 140's  monitor telemetry   keep K+>4, MG++>2  continue Eliquis for AC [id number] 4  NPO for ALLI/DCCV today   continue Metoprolol 50 mg BID   discontinue digoxin  plan is to start Amiodarone after ALLI/ cardioversion    [id number] addendum   s/p cardioversion with return to SR patient tolerated well  start Amiodarone load 200 mg TID to 5 gm load then 200 mg daily   monitor TFTs LFTs while on telemetry   upon discharge only give 1 month prescription no refills on Amiodarone and follow up with Dewayne Mckinley MD 1 month  call for appointment

## 2019-12-02 NOTE — PROGRESS NOTE ADULT - SUBJECTIVE AND OBJECTIVE BOX
24H hour events:  remains in Afib     MEDICATIONS:  apixaban 5 milliGRAM(s) Oral every 12 hours  aspirin enteric coated 81 milliGRAM(s) Oral daily  digoxin     Tablet 0.125 milliGRAM(s) Oral daily  metoprolol tartrate 50 milliGRAM(s) Oral two times a day  montelukast 10 milliGRAM(s) Oral at bedtime  acetaminophen   Tablet .. 650 milliGRAM(s) Oral every 6 hours PRN  pantoprazole    Tablet 40 milliGRAM(s) Oral before breakfast  atorvastatin 20 milliGRAM(s) Oral at bedtime  insulin lispro (HumaLOG) corrective regimen sliding scale   SubCutaneous three times a day before meals  insulin lispro (HumaLOG) corrective regimen sliding scale   SubCutaneous at bedtime  metFORMIN Extended-Release 750 milliGRAM(s) Oral daily      REVIEW OF SYSTEMS:  See HPI, otherwise ROS negative.    PHYSICAL EXAM:  T(C): 36.7 (12-02-19 @ 08:01), Max: 36.8 (12-01-19 @ 23:54)  HR: 87 (12-02-19 @ 08:01) (87 - 117)  BP: 118/65 (12-02-19 @ 08:01) (97/54 - 121/75)  RR: 18 (12-02-19 @ 08:01) (16 - 18)  SpO2: 95% (12-02-19 @ 08:01) (92% - 95%)  Wt(kg): --  I&O's Summary    01 Dec 2019 07:01  -  02 Dec 2019 07:00  --------------------------------------------------------  IN: 360 mL / OUT: 1250 mL / NET: -890 mL    Physical Exam:  General : obese pleasant  and no acute distress  Neuro : Alert and oriented x 3, no focal deficits  HEENT : Sclera clear, no JVD, no Lymphadenopathy no carotid bruits, neck supple  Lungs: Clear to Ascultation, no wheezing , rales or rhonchi   Cardiovascular irregular rate and rhythm  , + 1 +2,  no murmurs, no rubs  GI : obese abdomen soft, NT, ND, + BS   : no suprapubic tenderness  Extremities : mild trace pitting edema +2 DP and +2 PT, feet warm   Skin : mid line chest incision healing     LABS:	 	    CBC Full  -  ( 02 Dec 2019 05:27 )  WBC Count : 8.65 K/uL  Hemoglobin : 10.4 g/dL  Hematocrit : 31.8 %  Platelet Count - Automated : 459 K/uL  Mean Cell Volume : 88.6 fl  Mean Cell Hemoglobin : 29.0 pg  Mean Cell Hemoglobin Concentration : 32.7 gm/dL  Auto Neutrophil # : x  Auto Lymphocyte # : x  Auto Monocyte # : x  Auto Eosinophil # : x  Auto Basophil # : x  Auto Neutrophil % : x  Auto Lymphocyte % : x  Auto Monocyte % : x  Auto Eosinophil % : x  Auto Basophil % : x    12-02    134<L>  |  94<L>  |  6<L>  ----------------------------<  125<H>  4.3   |  26  |  0.49<L>  12-01    134<L>  |  93<L>  |  7   ----------------------------<  130<H>  3.8   |  26  |  0.49<L>    Ca    9.2      02 Dec 2019 05:27  Ca    9.2      01 Dec 2019 06:13      TSH: Thyroid Stimulating Hormone, Serum in AM (11.30.19 @ 10:29)    Thyroid Stimulating Hormone, Serum: 4.94 uIU/mL    TELEMETRY: afib  	    ECG:  	afib 24H hour events:  remains in Afib 60 to 110's brief burst to 140's , pause 4.2 sec in afib     MEDICATIONS:  apixaban 5 milliGRAM(s) Oral every 12 hours  aspirin enteric coated 81 milliGRAM(s) Oral daily  digoxin     Tablet 0.125 milliGRAM(s) Oral daily  metoprolol tartrate 50 milliGRAM(s) Oral two times a day  montelukast 10 milliGRAM(s) Oral at bedtime  acetaminophen   Tablet .. 650 milliGRAM(s) Oral every 6 hours PRN  pantoprazole    Tablet 40 milliGRAM(s) Oral before breakfast  atorvastatin 20 milliGRAM(s) Oral at bedtime  insulin lispro (HumaLOG) corrective regimen sliding scale   SubCutaneous three times a day before meals  insulin lispro (HumaLOG) corrective regimen sliding scale   SubCutaneous at bedtime  metFORMIN Extended-Release 750 milliGRAM(s) Oral daily      REVIEW OF SYSTEMS:  See HPI, otherwise ROS negative.    PHYSICAL EXAM:  T(C): 36.7 (19 @ 08:01), Max: 36.8 (19 @ 23:54)  HR: 87 (19 @ 08:01) (87 - 117)  BP: 118/65 (19 @ 08:01) (97/54 - 121/75)  RR: 18 (19 @ 08:01) (16 - 18)  SpO2: 95% (19 @ 08:01) (92% - 95%)  Wt(kg): --  I&O's Summary    01 Dec 2019 07:01  -  02 Dec 2019 07:00  --------------------------------------------------------  IN: 360 mL / OUT: 1250 mL / NET: -890 mL    Physical Exam:  General : obese pleasant  and no acute distress  Neuro : Alert and oriented x 3, no focal deficits  HEENT : Sclera clear, no JVD, no Lymphadenopathy no carotid bruits, neck supple  Lungs: Clear to Ascultation, no wheezing , rales or rhonchi   Cardiovascular irregular rate and rhythm  , + 1 +2,  no murmurs, no rubs  GI : obese abdomen soft, NT, ND, + BS   : no suprapubic tenderness  Extremities : mild trace pitting edema +2 DP and +2 PT, feet warm   Skin : mid line chest incision healing     LABS:	 	    CBC Full  -  ( 02 Dec 2019 05:27 )  WBC Count : 8.65 K/uL  Hemoglobin : 10.4 g/dL  Hematocrit : 31.8 %  Platelet Count - Automated : 459 K/uL  Mean Cell Volume : 88.6 fl  Mean Cell Hemoglobin : 29.0 pg  Mean Cell Hemoglobin Concentration : 32.7 gm/dL  Auto Neutrophil # : x  Auto Lymphocyte # : x  Auto Monocyte # : x  Auto Eosinophil # : x  Auto Basophil # : x  Auto Neutrophil % : x  Auto Lymphocyte % : x  Auto Monocyte % : x  Auto Eosinophil % : x  Auto Basophil % : x    12    134<L>  |  94<L>  |  6<L>  ----------------------------<  125<H>  4.3   |  26  |  0.49<L>      134<L>  |  93<L>  |  7   ----------------------------<  130<H>  3.8   |  26  |  0.49<L>    Ca    9.2      02 Dec 2019 05:27  Ca    9.2      01 Dec 2019 06:13      TSH: Thyroid Stimulating Hormone, Serum in AM (11.30.19 @ 10:29)    Thyroid Stimulating Hormone, Serum: 4.94 uIU/mL    TELEMETRY: remains in Afib 60 to 110's brief burst to 140's , pause 4.2 sec in afib       EC/2 Afib 24H hour events:  remains in Afib 60 to 110's brief burst to 140's , pause 4.2 sec in afib     MEDICATIONS:  apixaban 5 milliGRAM(s) Oral every 12 hours  aspirin enteric coated 81 milliGRAM(s) Oral daily  digoxin     Tablet 0.125 milliGRAM(s) Oral daily  metoprolol tartrate 50 milliGRAM(s) Oral two times a day  montelukast 10 milliGRAM(s) Oral at bedtime  acetaminophen   Tablet .. 650 milliGRAM(s) Oral every 6 hours PRN  pantoprazole    Tablet 40 milliGRAM(s) Oral before breakfast  atorvastatin 20 milliGRAM(s) Oral at bedtime  insulin lispro (HumaLOG) corrective regimen sliding scale   SubCutaneous three times a day before meals  insulin lispro (HumaLOG) corrective regimen sliding scale   SubCutaneous at bedtime  metFORMIN Extended-Release 750 milliGRAM(s) Oral daily      REVIEW OF SYSTEMS:  See HPI, otherwise ROS negative.    PHYSICAL EXAM:  T(C): 36.7 (19 @ 08:01), Max: 36.8 (19 @ 23:54)  HR: 87 (19 @ 08:01) (87 - 117)  BP: 118/65 (19 @ 08:01) (97/54 - 121/75)  RR: 18 (19 @ 08:01) (16 - 18)  SpO2: 95% (19 @ 08:01) (92% - 95%)  Wt(kg): --  I&O's Summary    01 Dec 2019 07:01  -  02 Dec 2019 07:00  --------------------------------------------------------  IN: 360 mL / OUT: 1250 mL / NET: -890 mL    Physical Exam:  General : obese pleasant  and no acute distress  Neuro : Alert and oriented x 3, no focal deficits  HEENT : Sclera clear, no JVD, no Lymphadenopathy no carotid bruits, neck supple  Lungs: Clear to Ascultation, no wheezing , rales or rhonchi   Cardiovascular irregular rate and rhythm  , + 1 +2,  no murmurs, no rubs  GI : obese abdomen soft, NT, ND, + BS   : no suprapubic tenderness  Extremities : mild trace pitting edema +2 DP and +2 PT, feet warm   Skin : mid line chest incision healing     LABS:	 	    CBC Full  -  ( 02 Dec 2019 05:27 )  WBC Count : 8.65 K/uL  Hemoglobin : 10.4 g/dL  Hematocrit : 31.8 %  Platelet Count - Automated : 459 K/uL  Mean Cell Volume : 88.6 fl  Mean Cell Hemoglobin : 29.0 pg  Mean Cell Hemoglobin Concentration : 32.7 gm/dL  Auto Neutrophil # : x  Auto Lymphocyte # : x  Auto Monocyte # : x  Auto Eosinophil # : x  Auto Basophil # : x  Auto Neutrophil % : x  Auto Lymphocyte % : x  Auto Monocyte % : x  Auto Eosinophil % : x  Auto Basophil % : x    12    134<L>  |  94<L>  |  6<L>  ----------------------------<  125<H>  4.3   |  26  |  0.49<L>      134<L>  |  93<L>  |  7   ----------------------------<  130<H>  3.8   |  26  |  0.49<L>    Ca    9.2      02 Dec 2019 05:27  Ca    9.2      01 Dec 2019 06:13      TSH: Thyroid Stimulating Hormone, Serum in AM (11.30.19 @ 10:29)    Thyroid Stimulating Hormone, Serum: 4.94 uIU/mL    TELEMETRY: remains in Afib 60 to 110's brief burst to 140's , pause 4.2 sec in afib       EC/2 Afib 92 bpm

## 2019-12-02 NOTE — PROGRESS NOTE ADULT - PROBLEM SELECTOR PLAN 2
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 bolivar  continue postop care  home pt tue/wed if hr stable

## 2019-12-02 NOTE — PROGRESS NOTE ADULT - ASSESSMENT
62 y/o female former smoker quit 1 month ago with PMH of HTN, HLD, COPD, T2DM, s/p aortic root replacement and ascending aorta replacement/aortic valve replacement on 11/12/19 with Dr. Morin. Admit from CT sx office with c/o dizziness, SOB, weakness and palpitations. Systolic BP in the 70s and noted to be in Afib with RVR. 64 y/o female former smoker quit 1 month ago with PMH of HTN, HLD, COPD, T2DM, s/p aortic root replacement and ascending aorta replacement/aortic valve replacement on 11/12/19 with Dr. Morin. Admit from CT sx office with c/o dizziness, SOB, weakness and palpitations. Systolic BP in the 70s and noted to be in Afib with RVR.

## 2019-12-02 NOTE — PROGRESS NOTE ADULT - PROBLEM SELECTOR PLAN 1
continue  lopressor 50 bid and d/c digoxin   anticoagulation with eliquis 5 bid;   ALLI/CV monday if patient remains in afib monday as per EP  monitor and supplement electrolytes continue  lopressor 50 bid and d/c digoxin/ amio 200 tid post cardioversion as per EP  anticoagulation with eliquis 5 bid;   s/p ALLI/CV today 12/2- pt now st/rsr 100-110   monitor and supplement electrolytes

## 2019-12-02 NOTE — PROGRESS NOTE ADULT - SUBJECTIVE AND OBJECTIVE BOX
VITAL SIGNS    Telemetry:    Vital Signs Last 24 Hrs  T(C): 36.7 (12-02-19 @ 08:01), Max: 36.8 (12-01-19 @ 23:54)  T(F): 98 (12-02-19 @ 07:07), Max: 98.2 (12-01-19 @ 23:54)  HR: 87 (12-02-19 @ 08:01) (87 - 117)  BP: 118/65 (12-02-19 @ 08:01) (97/54 - 121/75)  RR: 18 (12-02-19 @ 08:01) (16 - 18)  SpO2: 95% (12-02-19 @ 08:01) (92% - 95%)            12-01 @ 07:01  -  12-02 @ 07:00  --------------------------------------------------------  IN: 360 mL / OUT: 1250 mL / NET: -890 mL       Daily Height in cm: 160.02 (02 Dec 2019 08:01)    Daily   Admit Wt: Drug Dosing Weight  Height (cm): 160.02 (02 Dec 2019 08:01)  Weight (kg): 99.7 (02 Dec 2019 08:01)  BMI (kg/m2): 38.9 (02 Dec 2019 08:01)  BSA (m2): 2.01 (02 Dec 2019 08:01)      CAPILLARY BLOOD GLUCOSE      POCT Blood Glucose.: 138 mg/dL (02 Dec 2019 07:42)  POCT Blood Glucose.: 133 mg/dL (01 Dec 2019 21:54)  POCT Blood Glucose.: 102 mg/dL (01 Dec 2019 16:52)  POCT Blood Glucose.: 137 mg/dL (01 Dec 2019 12:37)          acetaminophen   Tablet .. 650 milliGRAM(s) Oral every 6 hours PRN  apixaban 5 milliGRAM(s) Oral every 12 hours  aspirin enteric coated 81 milliGRAM(s) Oral daily  atorvastatin 20 milliGRAM(s) Oral at bedtime  insulin lispro (HumaLOG) corrective regimen sliding scale   SubCutaneous three times a day before meals  insulin lispro (HumaLOG) corrective regimen sliding scale   SubCutaneous at bedtime  metFORMIN Extended-Release 750 milliGRAM(s) Oral daily  metoprolol tartrate 50 milliGRAM(s) Oral two times a day  montelukast 10 milliGRAM(s) Oral at bedtime  pantoprazole    Tablet 40 milliGRAM(s) Oral before breakfast      PHYSICAL EXAM    Subjective: "Hi.   Neurology: alert and oriented x 3, nonfocal, no gross deficits  CV : tele:  RSR  Sternal Wound :  CDI with dressing , Stable  Lungs: clear. RR easy, unlabored   Abdomen: soft, nontender, nondistended, positive bowel sounds, bowel movement   Neg N/V/D   :  pt voiding without difficulty   Extremities:   AZAR; edema, neg calf tenderness.   PPP bilaterally      PW:  Chest tubes: VITAL SIGNS    Telemetry:  afib - rsr/st 100-110 post cardioversion   Vital Signs Last 24 Hrs  T(C): 36.7 (12-02-19 @ 08:01), Max: 36.8 (12-01-19 @ 23:54)  T(F): 98 (12-02-19 @ 07:07), Max: 98.2 (12-01-19 @ 23:54)  HR: 87 (12-02-19 @ 08:01) (87 - 117)  BP: 118/65 (12-02-19 @ 08:01) (97/54 - 121/75)  RR: 18 (12-02-19 @ 08:01) (16 - 18)  SpO2: 95% (12-02-19 @ 08:01) (92% - 95%)            12-01 @ 07:01  -  12-02 @ 07:00  --------------------------------------------------------  IN: 360 mL / OUT: 1250 mL / NET: -890 mL       Daily Height in cm: 160.02 (02 Dec 2019 08:01)    Daily   Admit Wt: Drug Dosing Weight  Height (cm): 160.02 (02 Dec 2019 08:01)  Weight (kg): 99.7 (02 Dec 2019 08:01)  BMI (kg/m2): 38.9 (02 Dec 2019 08:01)  BSA (m2): 2.01 (02 Dec 2019 08:01)      CAPILLARY BLOOD GLUCOSE      POCT Blood Glucose.: 138 mg/dL (02 Dec 2019 07:42)  POCT Blood Glucose.: 133 mg/dL (01 Dec 2019 21:54)  POCT Blood Glucose.: 102 mg/dL (01 Dec 2019 16:52)  POCT Blood Glucose.: 137 mg/dL (01 Dec 2019 12:37)          acetaminophen   Tablet .. 650 milliGRAM(s) Oral every 6 hours PRN  apixaban 5 milliGRAM(s) Oral every 12 hours  aspirin enteric coated 81 milliGRAM(s) Oral daily  atorvastatin 20 milliGRAM(s) Oral at bedtime  insulin lispro (HumaLOG) corrective regimen sliding scale   SubCutaneous three times a day before meals  insulin lispro (HumaLOG) corrective regimen sliding scale   SubCutaneous at bedtime  metFORMIN Extended-Release 750 milliGRAM(s) Oral daily  metoprolol tartrate 50 milliGRAM(s) Oral two times a day  montelukast 10 milliGRAM(s) Oral at bedtime  pantoprazole    Tablet 40 milliGRAM(s) Oral before breakfast      PHYSICAL EXAM    Subjective: "I feel less dizzy now."   Neurology: alert and oriented x 3, nonfocal, no gross deficits  CV : tele: afib - rsr/st 100-110 post cardioversion    Sternal Wound :  CDI CHELE - sternum stable   Lungs: clear. RR easy, unlabored   Abdomen: soft, nontender, nondistended, positive bowel sounds, +bowel movement   Neg N/V/D; obese abdomen   :  pt voiding without difficulty   Extremities:   AZAR; neg LE edema, neg calf tenderness.   PPP bilaterally

## 2019-12-03 LAB
ANION GAP SERPL CALC-SCNC: 12 MMOL/L — SIGNIFICANT CHANGE UP (ref 5–17)
BUN SERPL-MCNC: 8 MG/DL — SIGNIFICANT CHANGE UP (ref 7–23)
CALCIUM SERPL-MCNC: 9.1 MG/DL — SIGNIFICANT CHANGE UP (ref 8.4–10.5)
CHLORIDE SERPL-SCNC: 95 MMOL/L — LOW (ref 96–108)
CO2 SERPL-SCNC: 28 MMOL/L — SIGNIFICANT CHANGE UP (ref 22–31)
CREAT SERPL-MCNC: 0.49 MG/DL — LOW (ref 0.5–1.3)
GLUCOSE BLDC GLUCOMTR-MCNC: 113 MG/DL — HIGH (ref 70–99)
GLUCOSE BLDC GLUCOMTR-MCNC: 118 MG/DL — HIGH (ref 70–99)
GLUCOSE BLDC GLUCOMTR-MCNC: 119 MG/DL — HIGH (ref 70–99)
GLUCOSE BLDC GLUCOMTR-MCNC: 138 MG/DL — HIGH (ref 70–99)
GLUCOSE SERPL-MCNC: 118 MG/DL — HIGH (ref 70–99)
HCT VFR BLD CALC: 30.2 % — LOW (ref 34.5–45)
HGB BLD-MCNC: 9.8 G/DL — LOW (ref 11.5–15.5)
MCHC RBC-ENTMCNC: 28.8 PG — SIGNIFICANT CHANGE UP (ref 27–34)
MCHC RBC-ENTMCNC: 32.5 GM/DL — SIGNIFICANT CHANGE UP (ref 32–36)
MCV RBC AUTO: 88.8 FL — SIGNIFICANT CHANGE UP (ref 80–100)
NRBC # BLD: 0 /100 WBCS — SIGNIFICANT CHANGE UP (ref 0–0)
PLATELET # BLD AUTO: 470 K/UL — HIGH (ref 150–400)
POTASSIUM SERPL-MCNC: 4.1 MMOL/L — SIGNIFICANT CHANGE UP (ref 3.5–5.3)
POTASSIUM SERPL-SCNC: 4.1 MMOL/L — SIGNIFICANT CHANGE UP (ref 3.5–5.3)
RBC # BLD: 3.4 M/UL — LOW (ref 3.8–5.2)
RBC # FLD: 15.8 % — HIGH (ref 10.3–14.5)
SODIUM SERPL-SCNC: 135 MMOL/L — SIGNIFICANT CHANGE UP (ref 135–145)
WBC # BLD: 8.4 K/UL — SIGNIFICANT CHANGE UP (ref 3.8–10.5)
WBC # FLD AUTO: 8.4 K/UL — SIGNIFICANT CHANGE UP (ref 3.8–10.5)

## 2019-12-03 PROCEDURE — 99024 POSTOP FOLLOW-UP VISIT: CPT

## 2019-12-03 PROCEDURE — 93010 ELECTROCARDIOGRAM REPORT: CPT

## 2019-12-03 RX ORDER — FUROSEMIDE 40 MG
40 TABLET ORAL DAILY
Refills: 0 | Status: DISCONTINUED | OUTPATIENT
Start: 2019-12-03 | End: 2019-12-04

## 2019-12-03 RX ORDER — METOPROLOL TARTRATE 50 MG
25 TABLET ORAL EVERY 12 HOURS
Refills: 0 | Status: DISCONTINUED | OUTPATIENT
Start: 2019-12-03 | End: 2019-12-04

## 2019-12-03 RX ORDER — POTASSIUM CHLORIDE 20 MEQ
20 PACKET (EA) ORAL DAILY
Refills: 0 | Status: DISCONTINUED | OUTPATIENT
Start: 2019-12-03 | End: 2019-12-04

## 2019-12-03 RX ADMIN — MONTELUKAST 10 MILLIGRAM(S): 4 TABLET, CHEWABLE ORAL at 21:52

## 2019-12-03 RX ADMIN — Medication 20 MILLIEQUIVALENT(S): at 10:38

## 2019-12-03 RX ADMIN — APIXABAN 5 MILLIGRAM(S): 2.5 TABLET, FILM COATED ORAL at 21:51

## 2019-12-03 RX ADMIN — PANTOPRAZOLE SODIUM 40 MILLIGRAM(S): 20 TABLET, DELAYED RELEASE ORAL at 06:00

## 2019-12-03 RX ADMIN — Medication 650 MILLIGRAM(S): at 00:01

## 2019-12-03 RX ADMIN — Medication 40 MILLIGRAM(S): at 10:38

## 2019-12-03 RX ADMIN — AMIODARONE HYDROCHLORIDE 200 MILLIGRAM(S): 400 TABLET ORAL at 13:52

## 2019-12-03 RX ADMIN — APIXABAN 5 MILLIGRAM(S): 2.5 TABLET, FILM COATED ORAL at 10:38

## 2019-12-03 RX ADMIN — Medication 25 MILLIGRAM(S): at 17:47

## 2019-12-03 RX ADMIN — Medication 81 MILLIGRAM(S): at 12:36

## 2019-12-03 RX ADMIN — AMIODARONE HYDROCHLORIDE 200 MILLIGRAM(S): 400 TABLET ORAL at 06:00

## 2019-12-03 RX ADMIN — AMIODARONE HYDROCHLORIDE 200 MILLIGRAM(S): 400 TABLET ORAL at 21:51

## 2019-12-03 RX ADMIN — Medication 50 MILLIGRAM(S): at 06:00

## 2019-12-03 RX ADMIN — METFORMIN HYDROCHLORIDE 750 MILLIGRAM(S): 850 TABLET ORAL at 12:36

## 2019-12-03 RX ADMIN — ATORVASTATIN CALCIUM 20 MILLIGRAM(S): 80 TABLET, FILM COATED ORAL at 21:52

## 2019-12-03 NOTE — PROGRESS NOTE ADULT - PROBLEM SELECTOR PLAN 1
- continue anticoagulation with apixaban 5mg PO BID   - continue metoprolol 50mg PO BID  - Amiodarone 200mg TID till 12/7/19 then 200mg daily  - f/u appt given with Dr. Mckinley on 1/17/20 @8:20  -Discharge planning as per primary team  -EP will sign off, please call us as needed.    EJ, NP [id number]

## 2019-12-03 NOTE — PROGRESS NOTE ADULT - SUBJECTIVE AND OBJECTIVE BOX
Pt is resting in the chair, NAD. No event overnight. states ' overall feeling better'    Tele: SR 70-90's bpm    ROS:  General: Pt denies recent weight loss/fever/chills    Neurological: denies numbness or  sensation loss    Cardiovascular: denies chest pain/palpitations/leg edema    Respiratory and Thorax: denies SOB/cough/wheezing    Gastrointestinal: denies abdominal pain/diarrhea/constipation/bloody stool    Genitourinary: denies urinary frequency/urgency/ dysuria    Musculoskeletal: denies joint pain or swelling, denies restricted motion    Hematologic: denies abnormal bleeding  	    Physical Exam:  Vital Signs Last 24 Hrs  T(C): 36.4 (03 Dec 2019 07:48), Max: 36.4 (02 Dec 2019 10:45)  T(F): 97.6 (03 Dec 2019 07:48), Max: 97.6 (02 Dec 2019 10:45)  HR: 74 (03 Dec 2019 07:48) (74 - 110)  BP: 122/56 (03 Dec 2019 07:48) (105/69 - 126/57)  BP(mean): 81 (03 Dec 2019 07:48) (77 - 87)  RR: 18 (03 Dec 2019 07:48) (18 - 18)  SpO2: 97% (03 Dec 2019 07:48) (90% - 97%)                 Constitutional: well developed, well nourished, no deformities and no acute distress    Neurological: Alert & Oriented x 3, AZAR, no focal deficits    HEENT: NC/AT, PERRLA, EOMI,  Neck supple.    Respiratory: mildly diminished in bases    Cardiovascular: (+) S1 & S2, RRR, No m/r/g    Gastrointestinal: soft, NT, nondistended, (+) BS    Genitourinary: non distended bladder, voiding freely    Extremities: No pedal edema, No clubbing, No cyanosis    Skin:  mid sternal op site : clear & dry      Allergies    No Known Allergies    MEDICATIONS  (STANDING):  aMIOdarone    Tablet 200 milliGRAM(s) Oral three times a day  apixaban 5 milliGRAM(s) Oral every 12 hours  aspirin enteric coated 81 milliGRAM(s) Oral daily  atorvastatin 20 milliGRAM(s) Oral at bedtime  insulin lispro (HumaLOG) corrective regimen sliding scale   SubCutaneous three times a day before meals  insulin lispro (HumaLOG) corrective regimen sliding scale   SubCutaneous at bedtime  metFORMIN Extended-Release 750 milliGRAM(s) Oral daily  metoprolol tartrate 50 milliGRAM(s) Oral two times a day  montelukast 10 milliGRAM(s) Oral at bedtime  pantoprazole    Tablet 40 milliGRAM(s) Oral before breakfast    MEDICATIONS  (PRN):  acetaminophen   Tablet .. 650 milliGRAM(s) Oral every 6 hours PRN Mild Pain (1 - 3)    LABS:                        9.8    8.40  )-----------( 470      ( 03 Dec 2019 05:19 )             30.2     12-03    135  |  95<L>  |  8   ----------------------------<  118<H>  4.1   |  28  |  0.49<L>    Ca    9.1      03 Dec 2019 05:18      EKG: SR 86bpm, ND 156ms, QTC 466ms

## 2019-12-03 NOTE — PROGRESS NOTE ADULT - PROBLEM SELECTOR PLAN 2
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 bolivar  continue postop care  home pt tue/wed if hr stable

## 2019-12-03 NOTE — CHART NOTE - NSCHARTNOTEFT_GEN_A_CORE
Pt had brief episode of HR 40's bpm around 11:30 while pt was sleeping in the chair, HR back to 90's bpm soon after.  Recommend to continue current regimen with amiodarone & metoprolol.  D/W Dr. Patel.

## 2019-12-03 NOTE — PROGRESS NOTE ADULT - SUBJECTIVE AND OBJECTIVE BOX
im ok    VITAL SIGNS    Telemetry:  nsr 90    Vital Signs Last 24 Hrs  T(C): 36.4 (19 @ 07:48), Max: 36.4 (19 @ 10:45)  T(F): 97.6 (19 @ 07:48), Max: 97.6 (19 @ 10:45)  HR: 74 (19 @ 07:48) (74 - 110)  BP: 122/56 (19 @ 07:48) (105/69 - 126/57)  RR: 18 (19 @ 07:48) (18 - 18)  SpO2: 94% (19 @ 07:48) (90% - 97%)                    @ 07:01  -   @ 07:00  --------------------------------------------------------  IN: 480 mL / OUT: 1650 mL / NET: -1170 mL     @ 07:01  -   @ 09:58  --------------------------------------------------------  IN: 240 mL / OUT: 0 mL / NET: 240 mL          Daily     Daily Weight in k.8 (03 Dec 2019 08:34)            CAPILLARY BLOOD GLUCOSE      POCT Blood Glucose.: 138 mg/dL (03 Dec 2019 08:03)  POCT Blood Glucose.: 135 mg/dL (02 Dec 2019 21:34)  POCT Blood Glucose.: 118 mg/dL (02 Dec 2019 17:19)  POCT Blood Glucose.: 104 mg/dL (02 Dec 2019 11:55)                    Coumadin    [ ] YES          [ x ]      NO      eliquis                           PHYSICAL EXAM        Neurology: alert and oriented x 3, nonfocal, no gross deficits  CV : s1 s2 RRR  Sternal Wound :  CDI , Stable  Lungs: cta  Abdomen: soft, nontender, nondistended, positive bowel sounds, last bowel movement +                        :    voiding    Extremities:     + edema   /  -   calve tenderness ,             acetaminophen   Tablet .. 650 milliGRAM(s) Oral every 6 hours PRN  aMIOdarone    Tablet 200 milliGRAM(s) Oral three times a day  apixaban 5 milliGRAM(s) Oral every 12 hours  aspirin enteric coated 81 milliGRAM(s) Oral daily  atorvastatin 20 milliGRAM(s) Oral at bedtime  insulin lispro (HumaLOG) corrective regimen sliding scale   SubCutaneous three times a day before meals  insulin lispro (HumaLOG) corrective regimen sliding scale   SubCutaneous at bedtime  metFORMIN Extended-Release 750 milliGRAM(s) Oral daily  metoprolol tartrate 50 milliGRAM(s) Oral two times a day  montelukast 10 milliGRAM(s) Oral at bedtime  pantoprazole    Tablet 40 milliGRAM(s) Oral before breakfast                    Physical Therapy Rec:   Home  [  ]   Home w/ PT  [  ]  Rehab  [  ]  Discussed with Cardiothoracic Team at AM rounds.

## 2019-12-03 NOTE — PROGRESS NOTE ADULT - ASSESSMENT
64 y/o female former smoker quit 1 month ago with PMH of HTN, HLD, COPD, T2DM, s/p aortic root replacement and ascending aorta replacement/aortic valve replacement on 11/12/19 with Dr. Morin. Admit from CT sx office with c/o dizziness, SOB, weakness and palpitations. Systolic BP in the 70s and noted to be in Afib with RVR. Pt started anticoagulation  & s/p ALLI/DCCV on 12/2/19. Pt remains in SR, feeling better.

## 2019-12-03 NOTE — PROGRESS NOTE ADULT - PROBLEM SELECTOR PLAN 1
continue  lopressor 50 bid and d/c digoxin/ amio 200 tid post cardioversion as per EP  anticoagulation with eliquis 5 bid;   s/p ALLI/CV today 12/2- pt now st/rsr 100-110   monitor and supplement electrolytes

## 2019-12-03 NOTE — PROGRESS NOTE ADULT - REASON FOR ADMISSION
hypotension, A-fib

## 2019-12-03 NOTE — PROVIDER CONTACT NOTE (OTHER) - ASSESSMENT
pt asymptomatic
Patient sitting in chair asymptomatic
Pt. asymptomatic. BP stable. Denies palpitations/dizziness.

## 2019-12-03 NOTE — PROGRESS NOTE ADULT - ASSESSMENT
62 y/o female with PMH of HTN, HLD, COPD, DM2, s/p aortic root replacement with ascending aorta replacement/aortic valve replacement on 11/12/19 with Dr. Morin who presents to the office today for follow-up visit with c/o dizziness, SOB, and palpitations. Pt states that she had been recovering well at home with the home health nurse and PT. Symptoms began acutely this AM. Denies syncope, chest pain/pressure, N/V/D, diaphoresis, or other associated symptoms. Currently the patient still endorses intermittent palpitations, but denies SOB or dizziness. In the office pt with a systolic BP in the 70s and noted to be in Afib with RVR. Admitted to [address] [place] [address] under Dr. Ocampo for further management.   11/28/19 PTT 93 this am - pt still in afib 106-140 - Amiodarone load initiated.  hr 145 - sbp 109 - IV BB 5mg x 3 every 5minutes ordered.  po lopressor dose increased to 25mg po bid. will continue to monitor in step down unit  11/29 rapid afib 100-130; bp stable - continue amio load and lopressor 50 mg po bid; d/c heparin gttp and initiate eliquis for anticoagulation as per Dr. Ocampo; EP consult called for afib: ALLI/CV monday if pt remains in afib; d/c amio/ continue b-blockers and digitalize for HR control   11/30 VSS; HR improved on dig/ lopressor 50 bid and anticoagulation with eliquis 5 bid; ALLI/CV monday if patient remains in afib monday as per EP  4.2 sec pause- dig d/c- pt returned to afib   12/1 VSS afib - maintain lopressor 50 mg bid and off dig as per Dr. Dave; anticoagulation with eliquis; ALLI/CV in am 12/2 as per EP   12/2 s/p ALLI/ CV today; now rsr/st 100-110; amio 200 tid initiated in addition to lopressor 50 bid as per EP; anticoagulation with eliquis; pt states s/p cv dizziness resolved   Discharge planning- home pt ? tue/wed if HR stable   12/3 Dr Ocampo to evaluate cta.  Lasix started, remains nsr.  D/c planning

## 2019-12-03 NOTE — PROVIDER CONTACT NOTE (OTHER) - ACTION/TREATMENT ORDERED:
Place defibrillator in room, check digoxin level in AM before dose
Metoprolol dose decrease as per provider
PO digoxin restarted. Given to patient per orders. Will continue to monitor pt.

## 2019-12-04 ENCOUNTER — TRANSCRIPTION ENCOUNTER (OUTPATIENT)
Age: 64
End: 2019-12-04

## 2019-12-04 VITALS
SYSTOLIC BLOOD PRESSURE: 110 MMHG | RESPIRATION RATE: 18 BRPM | HEART RATE: 81 BPM | TEMPERATURE: 97 F | OXYGEN SATURATION: 94 % | DIASTOLIC BLOOD PRESSURE: 74 MMHG

## 2019-12-04 LAB
ANION GAP SERPL CALC-SCNC: 15 MMOL/L — SIGNIFICANT CHANGE UP (ref 5–17)
BUN SERPL-MCNC: 10 MG/DL — SIGNIFICANT CHANGE UP (ref 7–23)
CALCIUM SERPL-MCNC: 9.3 MG/DL — SIGNIFICANT CHANGE UP (ref 8.4–10.5)
CHLORIDE SERPL-SCNC: 92 MMOL/L — LOW (ref 96–108)
CO2 SERPL-SCNC: 32 MMOL/L — HIGH (ref 22–31)
CREAT SERPL-MCNC: 0.51 MG/DL — SIGNIFICANT CHANGE UP (ref 0.5–1.3)
GLUCOSE BLDC GLUCOMTR-MCNC: 130 MG/DL — HIGH (ref 70–99)
GLUCOSE BLDC GLUCOMTR-MCNC: 141 MG/DL — HIGH (ref 70–99)
GLUCOSE SERPL-MCNC: 121 MG/DL — HIGH (ref 70–99)
HCT VFR BLD CALC: 31.3 % — LOW (ref 34.5–45)
HGB BLD-MCNC: 9.8 G/DL — LOW (ref 11.5–15.5)
MCHC RBC-ENTMCNC: 28.4 PG — SIGNIFICANT CHANGE UP (ref 27–34)
MCHC RBC-ENTMCNC: 31.3 GM/DL — LOW (ref 32–36)
MCV RBC AUTO: 90.7 FL — SIGNIFICANT CHANGE UP (ref 80–100)
NRBC # BLD: 0 /100 WBCS — SIGNIFICANT CHANGE UP (ref 0–0)
PLATELET # BLD AUTO: 468 K/UL — HIGH (ref 150–400)
POTASSIUM SERPL-MCNC: 4.4 MMOL/L — SIGNIFICANT CHANGE UP (ref 3.5–5.3)
POTASSIUM SERPL-SCNC: 4.4 MMOL/L — SIGNIFICANT CHANGE UP (ref 3.5–5.3)
RBC # BLD: 3.45 M/UL — LOW (ref 3.8–5.2)
RBC # FLD: 15.9 % — HIGH (ref 10.3–14.5)
SODIUM SERPL-SCNC: 139 MMOL/L — SIGNIFICANT CHANGE UP (ref 135–145)
WBC # BLD: 7.73 K/UL — SIGNIFICANT CHANGE UP (ref 3.8–10.5)
WBC # FLD AUTO: 7.73 K/UL — SIGNIFICANT CHANGE UP (ref 3.8–10.5)

## 2019-12-04 PROCEDURE — 97161 PT EVAL LOW COMPLEX 20 MIN: CPT

## 2019-12-04 PROCEDURE — 85610 PROTHROMBIN TIME: CPT

## 2019-12-04 PROCEDURE — 93010 ELECTROCARDIOGRAM REPORT: CPT

## 2019-12-04 PROCEDURE — 85027 COMPLETE CBC AUTOMATED: CPT

## 2019-12-04 PROCEDURE — 86803 HEPATITIS C AB TEST: CPT

## 2019-12-04 PROCEDURE — C8929: CPT

## 2019-12-04 PROCEDURE — 93005 ELECTROCARDIOGRAM TRACING: CPT

## 2019-12-04 PROCEDURE — 93325 DOPPLER ECHO COLOR FLOW MAPG: CPT

## 2019-12-04 PROCEDURE — 93312 ECHO TRANSESOPHAGEAL: CPT

## 2019-12-04 PROCEDURE — 80048 BASIC METABOLIC PNL TOTAL CA: CPT

## 2019-12-04 PROCEDURE — 86901 BLOOD TYPING SEROLOGIC RH(D): CPT

## 2019-12-04 PROCEDURE — 80053 COMPREHEN METABOLIC PANEL: CPT

## 2019-12-04 PROCEDURE — 80162 ASSAY OF DIGOXIN TOTAL: CPT

## 2019-12-04 PROCEDURE — 84443 ASSAY THYROID STIM HORMONE: CPT

## 2019-12-04 PROCEDURE — 85730 THROMBOPLASTIN TIME PARTIAL: CPT

## 2019-12-04 PROCEDURE — 92960 CARDIOVERSION ELECTRIC EXT: CPT

## 2019-12-04 PROCEDURE — 86900 BLOOD TYPING SEROLOGIC ABO: CPT

## 2019-12-04 PROCEDURE — 93320 DOPPLER ECHO COMPLETE: CPT

## 2019-12-04 PROCEDURE — 71275 CT ANGIOGRAPHY CHEST: CPT

## 2019-12-04 PROCEDURE — 99024 POSTOP FOLLOW-UP VISIT: CPT

## 2019-12-04 PROCEDURE — 86850 RBC ANTIBODY SCREEN: CPT

## 2019-12-04 PROCEDURE — 71045 X-RAY EXAM CHEST 1 VIEW: CPT

## 2019-12-04 PROCEDURE — 82962 GLUCOSE BLOOD TEST: CPT

## 2019-12-04 RX ORDER — AMIODARONE HYDROCHLORIDE 400 MG/1
1 TABLET ORAL
Qty: 30 | Refills: 0
Start: 2019-12-04 | End: 2020-01-02

## 2019-12-04 RX ORDER — FUROSEMIDE 40 MG
1 TABLET ORAL
Qty: 5 | Refills: 0
Start: 2019-12-04 | End: 2019-12-08

## 2019-12-04 RX ORDER — POTASSIUM CHLORIDE 20 MEQ
1 PACKET (EA) ORAL
Qty: 5 | Refills: 0
Start: 2019-12-04 | End: 2019-12-08

## 2019-12-04 RX ORDER — APIXABAN 2.5 MG/1
1 TABLET, FILM COATED ORAL
Qty: 60 | Refills: 0
Start: 2019-12-04 | End: 2020-01-02

## 2019-12-04 RX ORDER — ACETAMINOPHEN 500 MG
2 TABLET ORAL
Qty: 0 | Refills: 0 | DISCHARGE
Start: 2019-12-04

## 2019-12-04 RX ORDER — METOPROLOL TARTRATE 50 MG
1 TABLET ORAL
Qty: 60 | Refills: 0
Start: 2019-12-04 | End: 2020-01-02

## 2019-12-04 RX ADMIN — Medication 40 MILLIGRAM(S): at 05:49

## 2019-12-04 RX ADMIN — PANTOPRAZOLE SODIUM 40 MILLIGRAM(S): 20 TABLET, DELAYED RELEASE ORAL at 05:49

## 2019-12-04 RX ADMIN — METFORMIN HYDROCHLORIDE 750 MILLIGRAM(S): 850 TABLET ORAL at 13:38

## 2019-12-04 RX ADMIN — APIXABAN 5 MILLIGRAM(S): 2.5 TABLET, FILM COATED ORAL at 13:52

## 2019-12-04 RX ADMIN — AMIODARONE HYDROCHLORIDE 200 MILLIGRAM(S): 400 TABLET ORAL at 05:49

## 2019-12-04 RX ADMIN — Medication 25 MILLIGRAM(S): at 05:49

## 2019-12-04 RX ADMIN — Medication 81 MILLIGRAM(S): at 13:38

## 2019-12-04 RX ADMIN — Medication 20 MILLIEQUIVALENT(S): at 13:38

## 2019-12-04 RX ADMIN — AMIODARONE HYDROCHLORIDE 200 MILLIGRAM(S): 400 TABLET ORAL at 13:53

## 2019-12-04 NOTE — DISCHARGE NOTE PROVIDER - PROVIDER TOKENS
PROVIDER:[TOKEN:[[id number]],SCHEDULEDAPPT:[12/11/2019],SCHEDULEDAPPTTIME:[11:30 AM],ESTABLISHEDPATIENT:[T]],PROVIDER:[TOKEN:[[id number]],SCHEDULEDAPPT:[01/17/2020],SCHEDULEDAPPTTIME:[08:20 AM]],PROVIDER:[TOKEN:[[id number]],FOLLOWUP:[2 weeks]]

## 2019-12-04 NOTE — DISCHARGE NOTE NURSING/CASE MANAGEMENT/SOCIAL WORK - PATIENT PORTAL LINK FT
You can access the FollowMyHealth Patient Portal offered by [facility] by registering at the following website: [email or web address]. By joining [employer]’s FollowMyHealth portal, you will also be able to view your health information using other applications (apps) compatible with our system.

## 2019-12-04 NOTE — DISCHARGE NOTE PROVIDER - NSDCFUSCHEDAPPT_GEN_ALL_CORE_FT
OSWALD KULKARNI ; 12/11/2019 ; NPP CT Surg 300 Comm OSWALD Lewis ; 01/17/2020 ; NPP Cardio Electro [address]

## 2019-12-04 NOTE — DISCHARGE NOTE PROVIDER - HOSPITAL COURSE
64 y/o female with PMH of HTN, HLD, COPD, DM2, s/p aortic root replacement with ascending aorta replacement/aortic valve replacement on 11/12/19 with Dr. Morin who presents to the office today for follow-up visit with c/o dizziness, SOB, and palpitations. Pt states that she had been recovering well at home with the home health nurse and PT. Symptoms began acutely this AM. Denies syncope, chest pain/pressure, N/V/D, diaphoresis, or other associated symptoms. Currently the patient still endorses intermittent palpitations, but denies SOB or dizziness. In the office pt with a systolic BP in the 70s and noted to be in Afib with RVR. Admitted to [address] [place] [address] under Dr. Ocampo for further management.     11/28/19 PTT 93 this am - pt still in afib 106-140 - Amiodarone load initiated.  hr 145 - sbp 109 - IV BB 5mg x 3 every 5minutes ordered.  po lopressor dose increased to 25mg po bid. will continue to monitor in step down unit    11/29 rapid afib 100-130; bp stable - continue amio load and lopressor 50 mg po bid; d/c heparin gttp and initiate eliquis for anticoagulation as per Dr. Ocampo; EP consult called for afib: ALLI/CV monday if pt remains in afib; d/c amio/ continue b-blockers and digitalize for HR control     11/30 VSS; HR improved on dig/ lopressor 50 bid and anticoagulation with eliquis 5 bid; ALLI/CV monday if patient remains in afib monday as per EP    4.2 sec pause- dig d/c- pt returned to afib     12/1 VSS afib - maintain lopressor 50 mg bid and off dig as per Dr. Dave; anticoagulation with eliquis; ALLI/CV in am 12/2 as per EP     12/2 s/p ALLI/ CV today; now rsr/st 100-110; amio 200 tid initiated in addition to lopressor 50 bid as per EP; anticoagulation with eliquis; pt states s/p cv dizziness resolved     Discharge planning- home pt ? tue/wed if HR stable     12/3 CTA Chest repeated- s/p AVR, aortic root and ascending aortic aneurysm replacement without evidence of leakage, small B/L pleural effusions, Lasix started, remains nsr.  D/c planning     12/4 VSS, remains SR 80s on telemetry, cleared for discharge home today.

## 2019-12-04 NOTE — DISCHARGE NOTE PROVIDER - NSDCMRMEDTOKEN_GEN_ALL_CORE_FT
areds eye vitamin: 1 tab(s) orally 2 times a day  aspirin 81 mg oral delayed release tablet: 1 tab(s) orally once a day  atenolol 100 mg oral tablet: 1 tab(s) orally every 12 hours  atorvastatin 20 mg oral tablet: 1 tab(s) orally once a day (at bedtime)  furosemide 40 mg oral tablet: 1 tab(s) orally once a day  metFORMIN 750 mg oral tablet, extended release: 1 tab(s) orally once a day  oxycodone-acetaminophen 5 mg-325 mg oral tablet: 1 tab(s) orally every 6 hours, As needed, Moderate Pain (4 - 6) MDD:4  pantoprazole 40 mg oral delayed release tablet: 1 tab(s) orally once a day (before a meal)  polyethylene glycol 3350 oral powder for reconstitution: 17 gram(s) orally once a day  Singulair 10 mg oral tablet: 1 tab(s) orally once a day (at bedtime)  spironolactone 25 mg oral tablet: 2 tab(s) orally once a day  Vitamin D3: 1 tab(s) orally once a day acetaminophen 325 mg oral tablet: 2 tab(s) orally every 6 hours, As needed, Mild Pain (1 - 3)  amiodarone 200 mg oral tablet: 1 tab(s) orally once a day  apixaban 5 mg oral tablet: 1 tab(s) orally every 12 hours  areds eye vitamin: 1 tab(s) orally 2 times a day  aspirin 81 mg oral delayed release tablet: 1 tab(s) orally once a day  atorvastatin 20 mg oral tablet: 1 tab(s) orally once a day (at bedtime)  furosemide 40 mg oral tablet: 1 tab(s) orally once a day  metFORMIN 750 mg oral tablet, extended release: 1 tab(s) orally once a day  metoprolol tartrate 25 mg oral tablet: 1 tab(s) orally every 12 hours  pantoprazole 40 mg oral delayed release tablet: 1 tab(s) orally once a day (before a meal)  potassium chloride 20 mEq oral tablet, extended release: 1 tab(s) orally once a day  Singulair 10 mg oral tablet: 1 tab(s) orally once a day (at bedtime)  Vitamin D3: 1 tab(s) orally once a day

## 2019-12-04 NOTE — DISCHARGE NOTE PROVIDER - NSDCCPCAREPLAN_GEN_ALL_CORE_FT
PRINCIPAL DISCHARGE DIAGNOSIS  Diagnosis: Atrial fibrillation with RVR  Assessment and Plan of Treatment: Atrial fibrillation with RVR PRINCIPAL DISCHARGE DIAGNOSIS  Diagnosis: Atrial fibrillation with RVR  Assessment and Plan of Treatment: Continue taking your Eliquis 5 BID  Continue taking your metoprolol 25 BID and amiodarone 200 daily  Follow up with your electrophysiologist, Dr. Mckinley on 1/17/20 at 8:20AM. Call [phone] to confirm appointment.      SECONDARY DISCHARGE DIAGNOSES  Diagnosis: S/P aortic aneurysm repair  Assessment and Plan of Treatment: 1. Weight yourself daily.  2. Heart healthy diet - low fat, low cholesterol, no added salt.  3. Cleanse Midsternal incision daily while showering with warm water and mild soap, pat dry and maintain open to air.   4. Ambulate at least 3x daily.   5. Follow up with Dr. Morin at [facility] on Wednesday 12/11 at 11:30AM. Call [phone] to confirm appointment.

## 2019-12-04 NOTE — DISCHARGE NOTE PROVIDER - CARE PROVIDER_API CALL
Solomon Morin (MD)  Surgery; Thoracic and Cardiac Surgery  [address] [place] [address]  Phone: [phone]  Fax: [phone]  Established Patient  Scheduled Appointment: 12/11/2019 11:30 AM    Dewayne Mckinley)  Cardiac Electrophysiology; Cardiovascular Disease; Internal Medicine  [address] [place] [address]  Phone: [phone]  Fax: [phone]  Scheduled Appointment: 01/17/2020 08:20 AM    Anika Lagos (DO)  Family Medicine  Internal Medicine, [address] [place] [address]  Phone: [phone]  Fax: [phone]  Follow Up Time: 2 weeks

## 2019-12-05 ENCOUNTER — INBOUND DOCUMENT (OUTPATIENT)
Age: 64
End: 2019-12-05

## 2019-12-09 RX ORDER — SPIRONOLACTONE 25 MG/1
25 TABLET ORAL DAILY
Refills: 0 | Status: COMPLETED | COMMUNITY
Start: 2019-11-20 | End: 2019-12-09

## 2019-12-11 ENCOUNTER — APPOINTMENT (OUTPATIENT)
Dept: CARDIOTHORACIC SURGERY | Facility: CLINIC | Age: 64
End: 2019-12-11
Payer: COMMERCIAL

## 2019-12-11 VITALS
SYSTOLIC BLOOD PRESSURE: 117 MMHG | DIASTOLIC BLOOD PRESSURE: 81 MMHG | BODY MASS INDEX: 37.39 KG/M2 | TEMPERATURE: 97.4 F | RESPIRATION RATE: 16 BRPM | HEIGHT: 64 IN | WEIGHT: 219 LBS | OXYGEN SATURATION: 97 % | HEART RATE: 85 BPM

## 2019-12-11 DIAGNOSIS — Z09 ENCOUNTER FOR FOLLOW-UP EXAMINATION AFTER COMPLETED TREATMENT FOR CONDITIONS OTHER THAN MALIGNANT NEOPLASM: ICD-10-CM

## 2019-12-11 PROCEDURE — 99024 POSTOP FOLLOW-UP VISIT: CPT

## 2019-12-11 RX ORDER — ATENOLOL 100 MG/1
100 TABLET ORAL TWICE DAILY
Refills: 0 | Status: DISCONTINUED | COMMUNITY
Start: 2019-11-20 | End: 2019-12-11

## 2019-12-27 RX ORDER — PANTOPRAZOLE 40 MG/1
40 TABLET, DELAYED RELEASE ORAL DAILY
Qty: 30 | Refills: 0 | Status: ACTIVE | COMMUNITY
Start: 2019-11-20 | End: 1900-01-01

## 2019-12-27 RX ORDER — ASPIRIN 81 MG/1
81 TABLET ORAL DAILY
Qty: 30 | Refills: 0 | Status: ACTIVE | COMMUNITY
Start: 2019-11-20 | End: 1900-01-01

## 2020-01-17 ENCOUNTER — APPOINTMENT (OUTPATIENT)
Dept: ELECTROPHYSIOLOGY | Facility: CLINIC | Age: 65
End: 2020-01-17

## 2020-02-03 ENCOUNTER — APPOINTMENT (OUTPATIENT)
Dept: ELECTROPHYSIOLOGY | Facility: CLINIC | Age: 65
End: 2020-02-03
Payer: COMMERCIAL

## 2020-02-03 ENCOUNTER — NON-APPOINTMENT (OUTPATIENT)
Age: 65
End: 2020-02-03

## 2020-02-03 VITALS
HEART RATE: 76 BPM | SYSTOLIC BLOOD PRESSURE: 96 MMHG | BODY MASS INDEX: 37.56 KG/M2 | HEIGHT: 64 IN | WEIGHT: 220 LBS | DIASTOLIC BLOOD PRESSURE: 61 MMHG | OXYGEN SATURATION: 97 %

## 2020-02-03 PROCEDURE — 99213 OFFICE O/P EST LOW 20 MIN: CPT

## 2020-02-03 PROCEDURE — 93000 ELECTROCARDIOGRAM COMPLETE: CPT

## 2020-02-03 RX ORDER — FUROSEMIDE 40 MG/1
40 TABLET ORAL DAILY
Refills: 0 | Status: DISCONTINUED | COMMUNITY
Start: 2019-11-20 | End: 2020-02-03

## 2020-02-03 RX ORDER — POTASSIUM CHLORIDE 1500 MG/1
20 TABLET, FILM COATED, EXTENDED RELEASE ORAL
Qty: 30 | Refills: 0 | Status: DISCONTINUED | COMMUNITY
Start: 2019-12-09 | End: 2020-02-03

## 2020-02-03 NOTE — DISCUSSION/SUMMARY
[FreeTextEntry1] : In summary, this is a 64 year old woman with history bio Bentall and ascending aortic replacement with post-operative atrial fibrillation s/p cardioversion back to sinus rhythm. She is currently on amiodarone. She will continue therapy for an additional 2 weeks and then stop therapy to assess for recurrent arrhythmia. She will return for follow up in 3 months at which time she will be given an event monitor to assess for arrhythmia recurrence. It may be reasonable to discontinue anti-coagulation should no further AF be seen.\par \par Ms. Veronica appeared to understand the whole discussion and verbalized that all of her questions were answered to her satisfaction.\par \par Thank you for allowing me to be involved in the care of this pleasant woman. Please feel free to contact me with any questions.\par \par \par Justin Patel MD\par  of Cardiology\par Electrophysiology Section\par [address] [place] [address], [address] [place] [address]\[place] [address]\par Office: [phone]\par Fax: [phone]\par

## 2020-02-03 NOTE — PHYSICAL EXAM
[General Appearance - Well Developed] : well developed [Well Groomed] : well groomed [Normal Appearance] : normal appearance [General Appearance - Well Nourished] : well nourished [No Deformities] : no deformities [General Appearance - In No Acute Distress] : no acute distress [Normal Conjunctiva] : the conjunctiva exhibited no abnormalities [Eyelids - No Xanthelasma] : the eyelids demonstrated no xanthelasmas [Normal Oral Mucosa] : normal oral mucosa [No Oral Cyanosis] : no oral cyanosis [No Oral Pallor] : no oral pallor [No Jugular Venous Hardin A Waves] : no jugular venous hardin A waves [Normal Jugular Venous V Waves Present] : normal jugular venous V waves present [Normal Jugular Venous A Waves Present] : normal jugular venous A waves present [Heart Sounds] : normal S1 and S2 [Heart Rate And Rhythm] : heart rate and rhythm were normal [Murmurs] : no murmurs present [Respiration, Rhythm And Depth] : normal respiratory rhythm and effort [Auscultation Breath Sounds / Voice Sounds] : lungs were clear to auscultation bilaterally [Exaggerated Use Of Accessory Muscles For Inspiration] : no accessory muscle use [Abdomen Soft] : soft [Abdomen Tenderness] : non-tender [Gait - Sufficient For Exercise Testing] : the gait was sufficient for exercise testing [Abnormal Walk] : normal gait [Abdomen Mass (___ Cm)] : no abdominal mass palpated [Cyanosis, Localized] : no localized cyanosis [Nail Clubbing] : no clubbing of the fingernails [Petechial Hemorrhages (___cm)] : no petechial hemorrhages [Skin Color & Pigmentation] : normal skin color and pigmentation [] : no rash [No Venous Stasis] : no venous stasis [Skin Lesions] : no skin lesions [No Skin Ulcers] : no skin ulcer [No Xanthoma] : no  xanthoma was observed [Oriented To Time, Place, And Person] : oriented to person, place, and time [Affect] : the affect was normal [Mood] : the mood was normal [No Anxiety] : not feeling anxious

## 2020-02-03 NOTE — HISTORY OF PRESENT ILLNESS
[FreeTextEntry1] : Referring Physician: Solomon Morin MD and Roel Morales MD\par \par Dear Herminio Morales and Patience:\par \par Ms. Virgie Veronica was seen in the [facility] today. For our records, please allow me to summarize the history and my findings.\par \par This pleasant 64 year old woman has a cardiovascular history significant for HTN, HLD, COPD, T2DM, s/p aortic root replacement and ascending aorta replacement/aortic valve replacement on 11/12/19 with Dr. Morin. 10 days subsequently she was admitted from CT sx office with rapid atrial fibrillation after presenting with c/o dizziness, SOB, weakness and palpitations. Pt started anticoagulation and underwent ALLI/DCCV on 12/2/19. \par \par She presents now for follow up. She feels well without recurrent arrhythmic symptoms. She notes blood in the tissue paper when she blows her nose. Ms. Veronica denies any recent history of chest pain, shortness of breath, palpitations, dizziness, or syncope.

## 2020-02-04 NOTE — H&P PST ADULT - CONSTITUTIONAL
Foot & Ankle Surgery  February 4, 2020    Patient seen at bedside this PM for follow up on left foot, POD#1 sp 2nd toe amp and I&D foot for gas gangrene. No acute concerns.    BP (!) 146/87 (BP Location: Right arm)   Pulse 89   Temp 98.6  F (37  C) (Oral)   Resp 16   Ht 1.829 m (6')   Wt 111.1 kg (245 lb)   SpO2 99%   BMI 33.23 kg/m      PE - moderate drainage on bandage.  Retention sutures intact, packing noted. Mild seropurulent drainage noted and questionable viability of tissue laterally.    MRI - IMPRESSION:  1.  New amputation of the second toe. Edema at the second metatarsal head is likely reactive.  2.  Partial amputation of the first ray. Adjacent reactive soft tissue thickening and dystrophic calcification. No evidence for osteomyelitis.  3.  Tract/wound along the first and second ray's amputation margin with soft tissue edema/enhancement. No subcutaneous abscess.    Gram stain -   Gram Stain Abnormal  02/03/2020  1:24    Many   Gram positive cocci    Gram Stain Abnormal  02/03/2020  1:24    Moderate   Gram negative cocci    Gram Stain Abnormal  02/03/2020  1:24    Rare   Gram positive rods    Gram Stain 02/03/2020  1:24    Many   WBC'S seen   predominantly PMN's      Aerobic cultures pending    A/P - 51 yo neuropathic diabetic male POD#1 sp above for gas gangrene left foot  -personally reviewed culture/imaging results  -to OR tomorrow with Misael @ 1400 for revision I&D; anticipate debridement q2d until stable wound.  -NPO after midnight      Joseph Randhawa DPM FACFAS FACFAOM  Podiatric Foot & Ankle Surgeon  [facility]  [phone]       detailed exam

## 2020-06-10 ENCOUNTER — APPOINTMENT (OUTPATIENT)
Dept: CARDIOTHORACIC SURGERY | Facility: CLINIC | Age: 65
End: 2020-06-10
Payer: COMMERCIAL

## 2020-06-10 DIAGNOSIS — Z98.890 OTHER SPECIFIED POSTPROCEDURAL STATES: ICD-10-CM

## 2020-06-10 DIAGNOSIS — Z86.79 OTHER SPECIFIED POSTPROCEDURAL STATES: ICD-10-CM

## 2020-06-10 DIAGNOSIS — Z95.4 PRESENCE OF OTHER HEART-VALVE REPLACEMENT: ICD-10-CM

## 2020-06-10 DIAGNOSIS — Z95.2 PRESENCE OF OTHER HEART-VALVE REPLACEMENT: ICD-10-CM

## 2020-06-10 PROCEDURE — 99213 OFFICE O/P EST LOW 20 MIN: CPT | Mod: 95

## 2020-06-10 RX ORDER — AMIODARONE HYDROCHLORIDE 200 MG/1
200 TABLET ORAL DAILY
Qty: 30 | Refills: 0 | Status: COMPLETED | COMMUNITY
Start: 2019-12-09 | End: 2020-06-10

## 2020-06-10 RX ORDER — METOPROLOL TARTRATE 25 MG/1
25 TABLET, FILM COATED ORAL
Qty: 30 | Refills: 0 | Status: COMPLETED | COMMUNITY
Start: 2019-12-09 | End: 2020-06-10

## 2020-06-10 RX ORDER — APIXABAN 5 MG/1
5 TABLET, FILM COATED ORAL
Qty: 60 | Refills: 3 | Status: COMPLETED | COMMUNITY
Start: 2019-12-09 | End: 2020-06-10

## 2020-06-11 PROBLEM — Z95.4 STATUS POST COMBINED AORTIC ROOT AND VALVE REPLACEMENT USING STENTLESS BIOPROSTHETIC AORTIC VALVE: Status: ACTIVE | Noted: 2019-11-26

## 2020-06-11 PROBLEM — Z98.890 S/P THORACIC AORTIC ANEURYSM REPAIR: Status: ACTIVE | Noted: 2019-12-11

## 2020-06-15 NOTE — CONSULT LETTER
[FreeTextEntry1] : I had the pleasure of seeing your patient, OSWALD KULKARNI, in my office today. \par \par We take a multidisciplinary team approach to patient care and consider you, the physician, an extension of our team. We will maintain an open line of communication with you throughout your patient's treatment course.  \par \par As you recall, she is a 64 year year old female status post aortic root replacement and ascending aorta replacement/aortic valve replacement on 11/12/19. The patient presents to the office today for a routine follow up visit with repeat diagnostic imaging. I have enclosed a copy for your records.\par \par The surgical repair is intact and stable. Therefore, I have recommended that the patient will follow up in the [facility] in 1 year with a CTA chest to monitor her surgical repair. My office will assist the patient with her upcoming appointment and I will update you on her  progress at that time.\par \par I have discussed with the patient that we will continue to monitor her aortic pathology closely at the [facility], that encompasses the entire health care system and is one of the largest in the [place] at this point.\par \par I appreciate the opportunity to care for your patient at the [facility] based at [facility]. If there are any questions or concerns, please call me directly at [phone]. \par \par  [FreeTextEntry2] : Roel Morales MD\par [facility] \par [address]\[place] [address]\par Phone: [phone]  [FreeTextEntry3] : \par Solomon Morin M.D.\par Professor of Cardiovascular and Thoracic Surgery\par Minimally Invasive Valve Surgeon\par Director of Aortic Surgery, [facility]\par Cell: [phone]\par Email: [email or web address] \par \par [facility]:\par [address], [address] Floor, [place] [address]\par Office: [phone]\par Fax: [phone]\par \par [facility]:\par Department of Cardiovascular and Thoracic Surgery\par [address] [place] [address], [address]\par Office: [phone]\par Fax: [phone]\par \par Practice Manager: Ms. Zenia Marie\par Email: [email or web address]\par Phone: [phone]\par \par

## 2020-06-15 NOTE — PROCEDURE
[FreeTextEntry1] : \par Dr. Morin discussed activity restrictions with the patient, and would advise exercise at a moderate amount with no heavy lifting over one third of body weight, and avoiding heart rates that exceed 140 beats per minute. Every patient must abstain from tobacco and illicit drug use, especially stimulants such as cocaine or methamphetamine. The patient was also counseled on maintaining a healthy heart diet, and losing any excessive weight as this also put undue stress on both the aorta and entire cardiovascular system. Patient was counseled on the importance of medication adherence for blood pressure control, as hypertension is a significant risk factor associated with aortic dissections. First degree family members should be screened for bicuspid valve disease, and ascending aortic aneurysms.\par \par

## 2020-06-15 NOTE — DATA REVIEWED
[FreeTextEntry1] : Echo 12/2/19:\par 1. Bioprosthetic aortic valve replacement. No aortic valve\par regurgitation seen.\par 2. Patient status-post biological bentall. Status-post\par aortic root and ascending aorta replacement. Echo-free\par space with material seen surrounding the aortic graft. No\par color doppler flow seen into the space surrounding the\par aortic graft. Mild atheroma noted in the aortic arch and\par descending aorta.\par 3. No left atrial or left atrial appendage thrombus. Low\par normal left atrial appendage function (maximum\par velocity about 45 cm/s).\par 4. Patient in atrial fibrillation; ejection fraction varies\par with R-R interval. Grossly preserved left ventricular\par systolic function. Paradoxical septal motion consistent\par with prior cardiac surgery.\par 5. Grossly normal right ventricular size and function.\par \par \par CTA chest 6/3/20:\par There has been interval placement aortic valve prosthesis and the repaired ascending thoracic aorta. Overall the dilated thoracic aorta otherwise remain stable in appearance. Interval resolution left lung base nodule. Stable exam otherwise.

## 2020-06-15 NOTE — ASSESSMENT
[FreeTextEntry1] : 64 year old woman with HTN, HLD, COPD, T2DM, s/p aortic root replacement and ascending aorta replacement/aortic valve replacement on 11/12/19. Postop course significant for afib, on antiacogulation. Patient presents today for a followup visit with recent diagnostic imaging. \par \par The patient's medical records and diagnostic images were reviewed at the time of this office visit, and the following recommendation was made. The surgical repair is intact and stable.\par \par Plan:\par 1. Continue medication regimen\par 2. Follow up with PCP and cardiologist\par 3. BP control- I have recommended the patient to monitor her blood pressure closely. I have also advised the patient to take daily blood pressures at home and adhere to medication regimen.\par 4. Return to the [facility] in 1 year with a CTA chest

## 2020-06-15 NOTE — END OF VISIT
[FreeTextEntry3] : I, HO AL , am scribing for and in the presence of SUBHA DAY the following sections: History of present illness, past Medical/family/surgical/family/social history, review of systems, vital signs, physical exam and disposition.\par \par I personally performed the services described in the documentation, reviewed the documentation recorded by the scribe in my presence and it accurately and completely records my words and actions.\par \par

## 2020-06-15 NOTE — HISTORY OF PRESENT ILLNESS
[FreeTextEntry1] : 64 year old woman with HTN, HLD, COPD, T2DM, s/p aortic root replacement and ascending aorta replacement/aortic valve replacement on 11/12/19. Postop course significant for afib, on antiacogulation. Patient presents today for a followup visit with recent diagnostic imaging. \par \par CTA chest 6/3/20:\par There has been interval placement aortic valve prosthesis and the repaired ascending thoracic aorta. Overall the dilated thoracic aorta otherwise remain stable in appearance. Interval resolution left lung base nodule. Stable exam otherwise. \par \par Patient is doing well and denies recent hospitalization, ER visits, or surgeries. She  denies fever, chills, fatigue, headache, blurred vision, dizziness, syncope, chest pain, palpitations, shortness of breath, orthopnea, paroxysmal nocturnal dyspnea, nausea, vomiting, abdominal pain, back pain, BRBPR or swelling to legs.\par

## 2020-07-13 ENCOUNTER — NON-APPOINTMENT (OUTPATIENT)
Age: 65
End: 2020-07-13

## 2020-07-13 ENCOUNTER — APPOINTMENT (OUTPATIENT)
Dept: ELECTROPHYSIOLOGY | Facility: CLINIC | Age: 65
End: 2020-07-13
Payer: COMMERCIAL

## 2020-07-13 VITALS
HEART RATE: 106 BPM | OXYGEN SATURATION: 97 % | SYSTOLIC BLOOD PRESSURE: 118 MMHG | DIASTOLIC BLOOD PRESSURE: 77 MMHG | BODY MASS INDEX: 41.15 KG/M2 | HEIGHT: 64 IN | WEIGHT: 241 LBS

## 2020-07-13 DIAGNOSIS — R00.0 TACHYCARDIA, UNSPECIFIED: ICD-10-CM

## 2020-07-13 PROCEDURE — 99213 OFFICE O/P EST LOW 20 MIN: CPT

## 2020-07-13 PROCEDURE — 93000 ELECTROCARDIOGRAM COMPLETE: CPT

## 2020-07-13 RX ORDER — BLOOD SUGAR DIAGNOSTIC
STRIP MISCELLANEOUS
Qty: 100 | Refills: 0 | Status: DISCONTINUED | COMMUNITY
Start: 2020-06-30

## 2020-07-13 RX ORDER — METOPROLOL SUCCINATE 25 MG/1
25 TABLET, EXTENDED RELEASE ORAL DAILY
Refills: 3 | Status: ACTIVE | COMMUNITY
Start: 2020-06-02

## 2020-07-13 RX ORDER — ASCORBIC ACID 500 MG
500 TABLET ORAL
Refills: 0 | Status: ACTIVE | COMMUNITY

## 2020-07-13 RX ORDER — METFORMIN ER 500 MG 500 MG/1
500 TABLET ORAL TWICE DAILY
Refills: 0 | Status: ACTIVE | COMMUNITY
Start: 2020-06-02

## 2020-07-13 RX ORDER — ATORVASTATIN CALCIUM 20 MG/1
20 TABLET, FILM COATED ORAL
Refills: 0 | Status: DISCONTINUED | COMMUNITY
End: 2020-07-13

## 2020-07-13 RX ORDER — METFORMIN ER 750 MG 750 MG/1
750 TABLET ORAL DAILY
Refills: 0 | Status: DISCONTINUED | COMMUNITY
Start: 2019-11-20 | End: 2020-07-13

## 2020-07-13 RX ORDER — MONTELUKAST 10 MG/1
10 TABLET, FILM COATED ORAL
Qty: 1 | Refills: 6 | Status: DISCONTINUED | COMMUNITY
End: 2020-07-13

## 2020-07-13 RX ORDER — OMEPRAZOLE 40 MG/1
40 CAPSULE, DELAYED RELEASE ORAL
Qty: 30 | Refills: 0 | Status: DISCONTINUED | COMMUNITY
Start: 2020-06-23

## 2020-07-13 RX ORDER — ATORVASTATIN CALCIUM 40 MG/1
40 TABLET, FILM COATED ORAL
Qty: 90 | Refills: 0 | Status: ACTIVE | COMMUNITY
Start: 2020-03-11

## 2020-07-13 RX ORDER — METOPROLOL TARTRATE 25 MG/1
25 TABLET, FILM COATED ORAL
Qty: 60 | Refills: 2 | Status: DISCONTINUED | COMMUNITY
Start: 2020-06-10 | End: 2020-07-13

## 2020-07-14 NOTE — PHYSICAL EXAM
[General Appearance - Well Developed] : well developed [Normal Appearance] : normal appearance [General Appearance - Well Nourished] : well nourished [Well Groomed] : well groomed [General Appearance - In No Acute Distress] : no acute distress [No Deformities] : no deformities [Normal Conjunctiva] : the conjunctiva exhibited no abnormalities [Eyelids - No Xanthelasma] : the eyelids demonstrated no xanthelasmas [Normal Oral Mucosa] : normal oral mucosa [No Oral Pallor] : no oral pallor [No Oral Cyanosis] : no oral cyanosis [Normal Jugular Venous A Waves Present] : normal jugular venous A waves present [Normal Jugular Venous V Waves Present] : normal jugular venous V waves present [No Jugular Venous Hardin A Waves] : no jugular venous hardin A waves [Exaggerated Use Of Accessory Muscles For Inspiration] : no accessory muscle use [Auscultation Breath Sounds / Voice Sounds] : lungs were clear to auscultation bilaterally [Respiration, Rhythm And Depth] : normal respiratory rhythm and effort [Heart Sounds] : normal S1 and S2 [Heart Rate And Rhythm] : heart rate and rhythm were normal [Murmurs] : no murmurs present [Abdomen Tenderness] : non-tender [Abdomen Soft] : soft [Abdomen Mass (___ Cm)] : no abdominal mass palpated [Gait - Sufficient For Exercise Testing] : the gait was sufficient for exercise testing [Abnormal Walk] : normal gait [Nail Clubbing] : no clubbing of the fingernails [Skin Color & Pigmentation] : normal skin color and pigmentation [Cyanosis, Localized] : no localized cyanosis [Petechial Hemorrhages (___cm)] : no petechial hemorrhages [No Venous Stasis] : no venous stasis [] : no rash [No Skin Ulcers] : no skin ulcer [Skin Lesions] : no skin lesions [No Xanthoma] : no  xanthoma was observed [Oriented To Time, Place, And Person] : oriented to person, place, and time [Affect] : the affect was normal [No Anxiety] : not feeling anxious [Mood] : the mood was normal [FreeTextEntry1] : Bilateral 1 + pitting edema

## 2020-07-14 NOTE — DISCUSSION/SUMMARY
[FreeTextEntry1] : In summary, this is a 64 year old woman with history bio Bentall and ascending aortic replacement with post-operative atrial fibrillation s/p cardioversion back to sinus rhythm. Finished amiodarone therapy. Follows Dr Morales and got event monitor. Our office does not have results of event monitor and will get from Dr Morales office. Carmineenlty off apixaban.\par Has sinus tachycardia, unclear etiology at this point of sinus tachycardia. Also has weight gain of about 20 pounds since last visit in feb, 2020 and has bilateral 1 + pitting edema.\par Ordered TSH, Free T3, and CBC.\par Recommend to get a repeat Echocardiogram given change in clinical status as above.\par \par Ms. Veronica appeared to understand the whole discussion and verbalized that all of her questions were answered to her satisfaction.\par \par Thank you for allowing me to be involved in the care of this pleasant woman. Please feel free to contact me with any questions.\par \par Justin Patel MD\par  of Cardiology\par Electrophysiology Section\par [address] [place] [address], [address] [place] [address]\[place] [address]\par Office: [phone]\par Fax: [phone]\par

## 2020-07-14 NOTE — HISTORY OF PRESENT ILLNESS
[FreeTextEntry1] : Referring Physician: Solomon Morin MD and Roel Morales MD\par \par Dear Herminio Morales and Patience:\par \par Ms. Virgie Veronica was seen in the [facility] today. For our records, please allow me to summarize the history and my findings.\par \par This pleasant 64 year old woman has a cardiovascular history significant for HTN, HLD, COPD, T2DM, s/p aortic root replacement and ascending aorta replacement/aortic valve replacement on 11/12/19 with Dr. Morin. 10 days subsequently she was admitted from CT sx office with rapid atrial fibrillation after presenting with c/o dizziness, SOB, weakness and palpitations. Pt started anticoagulation and underwent ALLI/DCCV on 12/2/19. \par \par She presents now for follow up. Last seen on Feb 3, 2020. \par States she is doing well. Denies any chest pain, palpitations. Notices that her legs get swollen as the day progress. Denies any shortness of breath, dizziness or syncope.\par Currently off apixaban.\par Currently on Toprol XL 25 mg daily. States she was on lopressor 50 mg bid before and it was reduced to toprol XL 25 mg daily about 4 weeks ago. Checks vitals at home and heart rate is usually above 100.\par Follows Dr Morales and was last seen in June. States she got Event monitor at Dr Morales office.\par

## 2020-07-15 LAB
BASOPHILS # BLD AUTO: 0.06 K/UL
BASOPHILS NFR BLD AUTO: 0.7 %
EOSINOPHIL # BLD AUTO: 0.08 K/UL
EOSINOPHIL NFR BLD AUTO: 1 %
HCT VFR BLD CALC: 41.1 %
HGB BLD-MCNC: 13 G/DL
IMM GRANULOCYTES NFR BLD AUTO: 0.5 %
LYMPHOCYTES # BLD AUTO: 2.14 K/UL
LYMPHOCYTES NFR BLD AUTO: 26.2 %
MAN DIFF?: NORMAL
MCHC RBC-ENTMCNC: 27.7 PG
MCHC RBC-ENTMCNC: 31.6 GM/DL
MCV RBC AUTO: 87.4 FL
MONOCYTES # BLD AUTO: 0.5 K/UL
MONOCYTES NFR BLD AUTO: 6.1 %
NEUTROPHILS # BLD AUTO: 5.36 K/UL
NEUTROPHILS NFR BLD AUTO: 65.5 %
PLATELET # BLD AUTO: 300 K/UL
RBC # BLD: 4.7 M/UL
RBC # FLD: 14.9 %
T3FREE SERPL-MCNC: 2.47 PG/ML
TSH SERPL-ACNC: 3.37 UIU/ML
WBC # FLD AUTO: 8.18 K/UL

## 2020-10-01 NOTE — PATIENT PROFILE ADULT - NSPROEXTENSIONSOFSELF_GEN_A_NUR
Benign prostatic hyperplasia without lower urinary tract symptoms     Coronary artery disease involving native heart without angina pectoris
none

## 2020-12-15 PROBLEM — Z01.419 ENCOUNTER FOR ANNUAL ROUTINE GYNECOLOGICAL EXAMINATION: Status: RESOLVED | Noted: 2017-10-13 | Resolved: 2020-12-15

## 2020-12-23 PROBLEM — Z12.11 ENCOUNTER FOR COLORECTAL CANCER SCREENING: Status: RESOLVED | Noted: 2019-08-28 | Resolved: 2020-12-23

## 2021-02-09 NOTE — H&P PST ADULT - DOES THIS PATIENT HAVE A HISTORY OF OR HAS BEEN DX WITH HEART FAILURE?
Prep Survey      Responses   [facility] patient discharged from?  [facility]   Coco Abbasi RN         no

## 2021-05-14 NOTE — H&P PST ADULT - SURGICAL SITE INCISION
CHIEF COMPLAINT: presents for CEE.   Glasses and computer Rx are both working well    HISTORY OF PRESENT ILLNESS:  agree with tech note, denies pain, redness or discharge and denies vision changes.      OCT of the nerve fiber layer was performed today.     Signal strength: 9, 10    The average nerve fiber layer thickness was:  OD: 77 OS:  75    There were areas of mild sectoral thinning superior in the right eye and left eye        Vertical C/D  OD:  0.64   OS: 0.60    GLAUCOMA SUMMARY:    FAMILY HISTORY: negative for glaucoma  DILATED EXAM: Last done 5/14/21  VISUAL FIELD: last done-   OCT (optical coherence tomography): Right eye- 77 (5/14/21 slight superior thinning), 75 (3/3/20), 75 (1/28/19),  72.63 (11/13/17)   Left eye- 75 (5/14/21 slight superior thinning), 75 (3/3/20), 73 (1/28/19),  73.33 (11/13/17)  GONIOSCOPY: last done-   CONTRAINDICATIONS/ALLERGIES: none  PREVIOUS GLAUCOMA LASERS: none  PREVIOUS GLAUCOMA SURGICAL PROCEDURES: none.  PREVIOUS REFRACTIVE SURGERY: none        ASSESSMENT/PLAN      ASSESSMENT: (H52.13,  H52.203,  H52.4) Myopia of both eyes with astigmatism and presbyopia  (primary encounter diagnosis)  Comment: slight change in Rx  Plan: Rx was written as shown in the SmartForm.      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 Stable for 4 years  Plan: Monitor annually, no treatment or further testing this year               no

## 2021-08-27 NOTE — PHYSICAL THERAPY INITIAL EVALUATION ADULT - IMPAIRMENTS FOUND, PT EVAL
Called pt and he was informed to let us know if any other seizures occur. aerobic capacity/endurance/gait, locomotion, and balance

## 2022-03-06 NOTE — DATA REVIEWED
[FreeTextEntry1] : Echocardiogram 9/26/19: aortic root 5.6cm, EF40-45%.\par \par CTA chest 10/17/19: aortic root 4.2cm, ascending aorta 5.8cm, descending aorta tortuous and dilated. \par \par Cardiac cath 10/7/19 revealed: normal coronaries. \par  Motrin given at home./none

## 2023-03-22 NOTE — DISCHARGE NOTE NURSING/CASE MANAGEMENT/SOCIAL WORK - MODE OF TRANSPORTATION
hypertension      atorvastatin (LIPITOR) 20 MG tablet Take 1 tablet by mouth daily  Qty: 90 tablet, Refills: 0    Associated Diagnoses: Hyperlipidemia, unspecified hyperlipidemia type      Blood Glucose Monitoring Suppl (ONETOUCH VERIO REFLECT) w/Device KIT USE AS DIRECTED ONCE DAILY  Qty: 1 kit, Refills: 0      !! Insulin Pen Needle 32G X 4 MM MISC 1 each by Does not apply route daily  Qty: 100 each, Refills: 3      blood glucose monitor strips Test 2 times a day & as needed for symptoms of irregular blood glucose. Dispense sufficient amount for indicated testing frequency plus additional to accommodate PRN testing needs. Qty: 100 strip, Refills: 0    Comments: Brand per patient preference. May round up to next available package size.      !! Insulin Pen Needle (MEIJER PEN NEEDLES) 31G X 6 MM MISC 1 each by Does not apply route daily  Qty: 100 each, Refills: 3    Associated Diagnoses: Type 2 diabetes mellitus with autonomic neuropathy, unspecified long term insulin use status       ! ! - Potential duplicate medications found. Please discuss with provider. ALLERGIES     Patient has no known allergies. FAMILY HISTORY       Family History   Problem Relation Age of Onset    Stroke Mother     COPD Father     High Blood Pressure Father     Breast Cancer Sister     Diabetes Sister     High Cholesterol Sister     Diabetes Brother     High Blood Pressure Brother     High Cholesterol Brother           SOCIAL HISTORY       Social History     Socioeconomic History    Marital status:      Spouse name: None    Number of children: None    Years of education: None    Highest education level: None   Tobacco Use    Smoking status: Every Day     Packs/day: 1.00     Years: 25.00     Pack years: 25.00     Types: Cigarettes    Smokeless tobacco: Never   Vaping Use    Vaping Use: Never used   Substance and Sexual Activity    Alcohol use: Yes     Comment: once a week.   Beer    Drug use: No    Sexual activity: Yes Ambulatory

## 2023-05-22 NOTE — H&P PST ADULT - LYMPH NODES
General Surgery H&P  Consuelo Rose MRN# [id number]   Age/Sex: 47 year old female YOB: 1976     Reason for consult: appendicitis                           Assessment and Plan:   Assessment:  Acute appendicitis, possible perforation    Plan:  Reviewed exam, lab, and imaging findings with them. Findings consistent with appenidicitis with question of perforation. Recommend laparoscopic appendectomy. The procedure itself, along with risks and benefits, were discussed and she wishes to proceed with surgery. Pending findings in OR, she may possible be able to discharge from PACU or may need admission and IV abx if signs of rupture and abscess. Remain NPO. Will update further once timing in the OR is figured out.        Orestes Francis PA-C  [facility]  Surgery Clinic - [address]  [place] [address]?  Office: [phone]    I have seen and examined the patient, and agree with the assessment and plan as outlined above.  Tentative plan for OR at 11:00 this morning, I think we can anticipate discharge to home from PACU even if a small perforation is present depending on the degree of contamination.    Rahul Dent MD, FACS  [phone]  [facility]  General and Bariatric Surgery             Chief Complaint:     Chief Complaint   Patient presents with     Abdominal Pain        History is obtained from the patient    HPI:   Consuelo Rose is a 47 year old female who presents with worsening RLQ pain. She had generalized pain 2 days ago, but localized to the RLQ overnight. She presented to the ED and CT was consistent with appendicitis with possible perforation. He WBC, HR and temp are all elevated. Her n/v has improved since Saturday.     She denies any previous abdominal surgeries.           Past Medical History:   History reviewed. No pertinent past medical history.           Past Surgical History:   History reviewed. No pertinent  "surgical history.          Social History:               Family History:   History reviewed. No pertinent family history.           Allergies:   No Known Allergies           Medications:     Prior to Admission medications    Medication Sig Start Date End Date Taking? Authorizing Provider   aspirin (ASA) 325 MG tablet Take 325-650 mg by mouth every 6 hours as needed for moderate pain   Yes Unknown, Entered By History   aspirin 81 MG EC tablet Take 81 mg by mouth daily   Yes Unknown, Entered By History   lactobacillus rhamnosus, GG, (CULTURELL) capsule Take 1 capsule by mouth daily   Yes Unknown, Entered By History   lisinopril-hydrochlorothiazide (ZESTORETIC) 10-12.5 MG tablet Take 1 tablet by mouth daily   Yes Unknown, Entered By History   multivitamin, therapeutic (THERA-VIT) TABS tablet Take 1 tablet by mouth daily   Yes Unknown, Entered By History   simvastatin (ZOCOR) 20 MG tablet Take 20 mg by mouth At Bedtime   Yes Unknown, Entered By History   vitamin D3 (CHOLECALCIFEROL) 50 mcg (2000 units) tablet Take 50 mcg by mouth daily   Yes Unknown, Entered By History              Review of Systems:   The Review of Systems is negative other than noted in the HPI            Physical Exam:     Patient Vitals for the past 24 hrs:   BP Temp Pulse Resp SpO2 Height Weight   05/22/23 0734 120/64 -- 103 18 96 % -- --   05/22/23 0715 (!) 143/82 97.3  F (36.3  C) (!) 121 18 97 % 1.626 m (5' 4\") 100.8 kg (222 lb 3.6 oz)        No intake or output data in the 24 hours ending 05/22/23 0851   Constitutional:   awake, alert, cooperative, no apparent distress, and appears stated age       Eyes:   PERRL, conjunctiva/corneas clear, EOM's intact; no scleral edema or icterus noted        ENT:   Normocephalic, without obvious abnormality, atraumatic, Lips, mucosa, and tongue normal        Hematologic / Lymphatic:   No lymphadenopathy       Lungs:   Normal respiratory effort, no accessory muscle use       Cardiovascular:   Regular rate and " rhythm       Abdomen:   Soft, obese, nondistended, moderate ttp RLQ, positive rovsing's, no rebound or guarding       Musculoskeletal:   No obvious swelling, bruising or deformity       Skin:   Skin color and texture normal for patient, no rashes or lesions              Data:        Admission on 05/22/2023   Component Date Value     Hold Specimen 05/22/2023 [facility]      Hold Specimen 05/22/2023 Critical [facility] hospital      Sodium 05/22/2023 138      Potassium 05/22/2023 3.2 (L)      Chloride 05/22/2023 100      Carbon Dioxide (CO2) 05/22/2023 25      Anion Gap 05/22/2023 13      Urea Nitrogen 05/22/2023 10      Creatinine 05/22/2023 0.84      Calcium 05/22/2023 9.6      Glucose 05/22/2023 170 (H)      GFR Estimate 05/22/2023 85      Bilirubin Total 05/22/2023 1.1 (H)      Bilirubin Direct 05/22/2023 0.4      Protein Total 05/22/2023 7.3      Albumin 05/22/2023 4.1      Alkaline Phosphatase 05/22/2023 65      AST 05/22/2023 14      ALT 05/22/2023 23      Lipase 05/22/2023 17      Color Urine 05/22/2023 Yellow      Appearance Urine 05/22/2023 Clear      Glucose Urine 05/22/2023 Negative      Bilirubin Urine 05/22/2023 Negative      Ketones Urine 05/22/2023 10 (A)      Specific Gravity Urine 05/22/2023 1.025      Blood Urine 05/22/2023 Negative      pH Urine 05/22/2023 6.0      Protein Albumin Urine 05/22/2023 30 (A)      Urobilinogen Urine 05/22/2023 2.0 (A)      Nitrite Urine 05/22/2023 Negative      Leukocyte Esterase Urine 05/22/2023 500 Damaris/uL (A)      Bacteria Urine 05/22/2023 Few (A)      Mucus Urine 05/22/2023 Present (A)      RBC Urine 05/22/2023 2      WBC Urine 05/22/2023 47 (H)      Squamous Epithelials Uri* 05/22/2023 6 (H)      hCG Urine Qualitative 05/22/2023 Negative      WBC Count 05/22/2023 19.4 (H)      RBC Count 05/22/2023 5.26 (H)      Hemoglobin 05/22/2023 15.6      Hematocrit 05/22/2023 45.6      MCV 05/22/2023 87      MCH 05/22/2023 29.7      MCHC 05/22/2023 34.2      RDW 05/22/2023 13.0      Platelet Count 05/22/2023  281      % Neutrophils 05/22/2023 81      % Lymphocytes 05/22/2023 8      % Monocytes 05/22/2023 10      % Eosinophils 05/22/2023 0      % Basophils 05/22/2023 0      % Immature Granulocytes 05/22/2023 1      NRBCs per 100 WBC 05/22/2023 0      Absolute Neutrophils 05/22/2023 15.8 (H)      Absolute Lymphocytes 05/22/2023 1.5      Absolute Monocytes 05/22/2023 2.0 (H)      Absolute Eosinophils 05/22/2023 0.0      Absolute Basophils 05/22/2023 0.0      Absolute Immature Granul* 05/22/2023 0.1      Absolute NRBCs 05/22/2023 0.0          All imaging studies reviewed by me.          detailed exam

## 2023-08-09 NOTE — DISCHARGE NOTE PROVIDER - NSDCPNSUBOBJ_GEN_ALL_CORE
36.6 Vital Signs Last 24 Hrs    T(C): 36.5 (04 Dec 2019 05:25), Max: 36.8 (03 Dec 2019 14:18)    T(F): 97.7 (04 Dec 2019 05:25), Max: 98.2 (03 Dec 2019 14:18)    HR: 93 (04 Dec 2019 05:25) (90 - 93)    BP: 111/73 (04 Dec 2019 05:25) (98/54 - 111/73)    BP(mean): 70 (03 Dec 2019 13:51) (70 - 71)    RR: 18 (04 Dec 2019 05:25) (16 - 18)    SpO2: 90% (04 Dec 2019 05:25) (90% - 93%)            PHYSICAL EXAM    Neurology: A&Ox3, nonfocal, no gross deficits    CV : RRR+S1S2    Sternal Wound: MSI CDI CHELE, Stable    Lungs: Respirations non-labored, B/L BS clear, diminished at bases    Abdomen: Soft, NT/ND, +BSx4Q, last BM 12/4 (-)N/V/D    : Voiding without difficulty    Extremities: B/L LE trace edema, negative calf tenderness, +PP            45 minutes face to face spent on total encounter, patient counseling and discharge instructions.

## 2023-10-04 NOTE — PHYSICAL THERAPY INITIAL EVALUATION ADULT - LEVEL OF INDEPENDENCE: SIT/SUPINE, REHAB EVAL
Pt called, regarding upcoming appt on Oct 07th, at 8:55. Pt accidentally canceled marianne, responding to appt reminder text. Pt would like to keep initial appt.  Please assist independent

## 2024-02-05 NOTE — PRE-ANESTHESIA EVALUATION ADULT - NSANTHASARD_GEN_ALL_CORE
Becka Duckworth is a 63 year old female presenting with   Chief Complaint   Patient presents with    Office Visit    New Patient     R76.8 (ICD-10-CM) - Elevated rheumatoid factor      Patient unable to use her hands.  Patient's pain in hands worsens throughout the day and her hands feel cold.   Patient had spinal surgery on November 4th 2023.     PAIN: How much pain have you had because of your arthritis/disease in the past week?    NO PAIN [] [] [] [] [x] [] [] [] [] [] [] SEVERE PAIN    0 1 2 3 4 5 6 7 8 9 10      DISEASE ACTIVITY:  Considering all the ways your arthritis/disease affects you?  VERY WELL [] [] [] [] [] [] [] [] [] [x] [] VERY POORLY    0 1 2 3 4 5 6 7 8 9 10      FATIGUE:  How much of a problem has unusual fatigue or tiredness been for you in the past week?    NO PROBLEM [x] [] [] [] [] [] [] [] [] [] [] MAJOR PROBLEM    0 1 2 3 4 5 6 7 8 9 10      Are you currently pregnant?  No  Is there any possibility that you could be pregnant?  No  Are you planning on becoming pregnant within the next year? No     Visit Vitals  BP (!) 144/82 (BP Location: LUE - Left upper extremity, Patient Position: Sitting)   Pulse (!) 105   Temp 99 °F (37.2 °C) (Temporal)   Resp 16   Wt 62.6 kg (138 lb)   LMP  (LMP Unknown)   BMI 26.07 kg/m²         Medications have been reviewed and updated    Denies known Latex allergy or symptoms of Latex sensitivity.  Tobacco use verified.    Health Maintenance Due   Topic Date Due    Osteoporosis Screening  Never done    COVID-19 Vaccine (5 - 2023-24 season) 09/01/2023    Traditional Medicare- Medicare Wellness Visit  06/01/2024       Patient would like communication of their results via:   [employer]      Immunization History   Administered Date(s) Administered    COVID Pfizer 12Y+ (Requires Dilution) 03/10/2021, 04/01/2021, 12/10/2021    COVID Pfizer Bivalent 12Y+ 03/13/2023    Hep B, adult 01/10/2023, 02/24/2023, 08/30/2023    Influenza, High Dose quadrivalent, preserve-free  10/02/2023    Influenza, quadrivalent, multi-dose 09/21/2017    Influenza, quadrivalent, preserve-free 10/09/2018, 10/21/2019, 10/02/2020, 10/24/2022    Influenza, seasonal, injectable, trivalent 10/10/2016, 10/07/2021    Pneumococcal Conjugate 20 Valent Vacc (Prevnar 20) 01/10/2023    Pneumococcal Polysaccharide Vacc (Pneumovax 23) 09/18/2012    Shingrix (Shingles Zoster) 01/28/2020, 07/06/2020    Tdap 10/09/2018              4

## 2024-03-29 ENCOUNTER — NON-APPOINTMENT (OUTPATIENT)
Age: 69
End: 2024-03-29

## 2024-07-05 ENCOUNTER — APPOINTMENT (OUTPATIENT)
Dept: BREAST CENTER | Facility: CLINIC | Age: 69
End: 2024-07-05
Payer: COMMERCIAL

## 2024-07-05 VITALS
SYSTOLIC BLOOD PRESSURE: 114 MMHG | DIASTOLIC BLOOD PRESSURE: 77 MMHG | WEIGHT: 224 LBS | HEART RATE: 85 BPM | BODY MASS INDEX: 39.69 KG/M2 | HEIGHT: 63 IN

## 2024-07-05 DIAGNOSIS — C77.3 MALIGNANT NEOPLASM OF UNSPECIFIED SITE OF LEFT FEMALE BREAST: ICD-10-CM

## 2024-07-05 DIAGNOSIS — Z78.9 OTHER SPECIFIED HEALTH STATUS: ICD-10-CM

## 2024-07-05 DIAGNOSIS — Z72.3 LACK OF PHYSICAL EXERCISE: ICD-10-CM

## 2024-07-05 DIAGNOSIS — Z83.3 FAMILY HISTORY OF DIABETES MELLITUS: ICD-10-CM

## 2024-07-05 DIAGNOSIS — C50.912 MALIGNANT NEOPLASM OF UNSPECIFIED SITE OF LEFT FEMALE BREAST: ICD-10-CM

## 2024-07-05 PROCEDURE — 99205 OFFICE O/P NEW HI 60 MIN: CPT

## 2024-07-05 RX ORDER — SEMAGLUTIDE 2.68 MG/ML
INJECTION, SOLUTION SUBCUTANEOUS
Refills: 0 | Status: ACTIVE | COMMUNITY

## 2024-07-08 ENCOUNTER — NON-APPOINTMENT (OUTPATIENT)
Age: 69
End: 2024-07-08

## 2024-07-16 ENCOUNTER — OUTPATIENT (OUTPATIENT)
Dept: OUTPATIENT SERVICES | Facility: HOSPITAL | Age: 69
LOS: 1 days | End: 2024-07-16
Payer: COMMERCIAL

## 2024-07-16 ENCOUNTER — APPOINTMENT (OUTPATIENT)
Dept: MRI IMAGING | Facility: CLINIC | Age: 69
End: 2024-07-16
Payer: COMMERCIAL

## 2024-07-16 ENCOUNTER — NON-APPOINTMENT (OUTPATIENT)
Age: 69
End: 2024-07-16

## 2024-07-16 DIAGNOSIS — Z95.2 PRESENCE OF PROSTHETIC HEART VALVE: Chronic | ICD-10-CM

## 2024-07-16 DIAGNOSIS — Z00.8 ENCOUNTER FOR OTHER GENERAL EXAMINATION: ICD-10-CM

## 2024-07-16 DIAGNOSIS — Z98.890 OTHER SPECIFIED POSTPROCEDURAL STATES: Chronic | ICD-10-CM

## 2024-07-16 DIAGNOSIS — C50.912 MALIGNANT NEOPLASM OF UNSPECIFIED SITE OF LEFT FEMALE BREAST: ICD-10-CM

## 2024-07-16 PROCEDURE — C8908: CPT

## 2024-07-16 PROCEDURE — A9585: CPT

## 2024-07-16 PROCEDURE — C8937: CPT

## 2024-07-16 PROCEDURE — 77049 MRI BREAST C-+ W/CAD BI: CPT | Mod: 26

## 2024-07-17 ENCOUNTER — APPOINTMENT (OUTPATIENT)
Dept: NUCLEAR MEDICINE | Facility: CLINIC | Age: 69
End: 2024-07-17
Payer: COMMERCIAL

## 2024-07-17 ENCOUNTER — OUTPATIENT (OUTPATIENT)
Dept: OUTPATIENT SERVICES | Facility: HOSPITAL | Age: 69
LOS: 1 days | End: 2024-07-17
Payer: COMMERCIAL

## 2024-07-17 DIAGNOSIS — C50.912 MALIGNANT NEOPLASM OF UNSPECIFIED SITE OF LEFT FEMALE BREAST: ICD-10-CM

## 2024-07-17 DIAGNOSIS — Z98.890 OTHER SPECIFIED POSTPROCEDURAL STATES: Chronic | ICD-10-CM

## 2024-07-17 DIAGNOSIS — Z95.2 PRESENCE OF PROSTHETIC HEART VALVE: Chronic | ICD-10-CM

## 2024-07-17 PROCEDURE — A9552: CPT

## 2024-07-17 PROCEDURE — 78815 PET IMAGE W/CT SKULL-THIGH: CPT

## 2024-07-17 PROCEDURE — 78815 PET IMAGE W/CT SKULL-THIGH: CPT | Mod: 26,PI

## 2024-07-18 ENCOUNTER — NON-APPOINTMENT (OUTPATIENT)
Age: 69
End: 2024-07-18

## 2024-07-18 DIAGNOSIS — R92.8 OTHER ABNORMAL AND INCONCLUSIVE FINDINGS ON DIAGNOSTIC IMAGING OF BREAST: ICD-10-CM

## 2024-07-20 DIAGNOSIS — Z86.39 PERSONAL HISTORY OF OTHER ENDOCRINE, NUTRITIONAL AND METABOLIC DISEASE: ICD-10-CM

## 2024-07-20 DIAGNOSIS — Z09 ENCOUNTER FOR FOLLOW-UP EXAMINATION AFTER COMPLETED TREATMENT FOR CONDITIONS OTHER THAN MALIGNANT NEOPLASM: ICD-10-CM

## 2024-07-20 DIAGNOSIS — E78.5 HYPERLIPIDEMIA, UNSPECIFIED: ICD-10-CM

## 2024-07-20 DIAGNOSIS — R19.5 OTHER FECAL ABNORMALITIES: ICD-10-CM

## 2024-07-20 DIAGNOSIS — Z86.018 PERSONAL HISTORY OF OTHER BENIGN NEOPLASM: ICD-10-CM

## 2024-07-20 DIAGNOSIS — R93.8 ABNORMAL FINDINGS ON DIAGNOSTIC IMAGING OF OTHER SPECIFIED BODY STRUCTURES: ICD-10-CM

## 2024-07-20 DIAGNOSIS — J44.9 CHRONIC OBSTRUCTIVE PULMONARY DISEASE, UNSPECIFIED: ICD-10-CM

## 2024-07-20 PROBLEM — Z13.79 GENETIC TESTING: Status: RESOLVED | Noted: 2024-07-05 | Resolved: 2024-07-20

## 2024-07-23 ENCOUNTER — OUTPATIENT (OUTPATIENT)
Dept: OUTPATIENT SERVICES | Facility: HOSPITAL | Age: 69
LOS: 1 days | Discharge: HOME | End: 2024-07-23
Payer: COMMERCIAL

## 2024-07-23 DIAGNOSIS — R92.8 OTHER ABNORMAL AND INCONCLUSIVE FINDINGS ON DIAGNOSTIC IMAGING OF BREAST: ICD-10-CM

## 2024-07-23 DIAGNOSIS — Z95.2 PRESENCE OF PROSTHETIC HEART VALVE: Chronic | ICD-10-CM

## 2024-07-23 DIAGNOSIS — Z98.890 OTHER SPECIFIED POSTPROCEDURAL STATES: Chronic | ICD-10-CM

## 2024-07-24 ENCOUNTER — APPOINTMENT (OUTPATIENT)
Dept: HEMATOLOGY ONCOLOGY | Facility: CLINIC | Age: 69
End: 2024-07-24

## 2024-07-24 VITALS
SYSTOLIC BLOOD PRESSURE: 116 MMHG | WEIGHT: 221 LBS | HEART RATE: 83 BPM | HEIGHT: 63 IN | DIASTOLIC BLOOD PRESSURE: 73 MMHG | OXYGEN SATURATION: 94 % | TEMPERATURE: 97.8 F | BODY MASS INDEX: 39.16 KG/M2

## 2024-07-24 PROCEDURE — XXXXX: CPT | Mod: 1L

## 2024-07-24 NOTE — HISTORY OF PRESENT ILLNESS
[de-identified] : 67 y/o female with newly diagnosed invasive lobular left breast cancer metastatic to axillary LN. She was found to have abnormalities on her first   mammogram May 2024.  Mammogram : 23 x 15 mm focal asymmetry lower outer left . US : Left 3:00 N 8 irregular mass 8x4x5 mm    ;  left 5 N 10 irregular mass [id number] mm Left breast biopsy  24  : left 5N10 :  invasive lobular cancer , grade 2 ( 6/9)   ER >90 % AR > 90 % HER2 2+, FISH    Left 3N8  benign Left axillary LN - metastatic carcinoma   Breast MRI - 3.6 cm dominat mass left breast at 5:00 ( known cancer) with tiny satellite foci  and additional smaller nodules for which MR biopsy and US biopsy were recommended  PET 24 :  avid left breast mass at 5:00, avid left  axillary lymph node. No FDG avid distant mets.  Focal FDG activuity in mid distant colon with no CT correlate.  She was evaluated by Dr Morales- lumpectomy would be preferred given her comorbidities. Additional MR and US biopsies of the left breast are scheduled.   She has  significant medical comorbidities: valvular heart disease s/p aortic valve replacement ( bioprosthetic valve)   s/p aotric aneurysm repair  A fib  DM2  GYn Hx : menarche at 11  menopause at 48   No HRT  [de-identified] : Genetic testing [facility]  BRCA1/2 plus  negative  ( 13 genes)

## 2024-07-24 NOTE — ASSESSMENT
[FreeTextEntry1] : 69 y/o female with newly diagnosed clinical T2 N1 stage II A invasive lobular  left breast cancer ER

## 2024-07-26 DIAGNOSIS — Z13.79 ENCOUNTER FOR OTHER SCREENING FOR GENETIC AND CHROMOSOMAL ANOMALIES: ICD-10-CM

## 2024-07-30 ENCOUNTER — APPOINTMENT (OUTPATIENT)
Dept: GASTROENTEROLOGY | Facility: CLINIC | Age: 69
End: 2024-07-30
Payer: COMMERCIAL

## 2024-07-30 VITALS
WEIGHT: 221 LBS | HEIGHT: 63 IN | OXYGEN SATURATION: 96 % | BODY MASS INDEX: 39.16 KG/M2 | HEART RATE: 88 BPM | SYSTOLIC BLOOD PRESSURE: 138 MMHG | DIASTOLIC BLOOD PRESSURE: 87 MMHG

## 2024-07-30 DIAGNOSIS — C77.3 MALIGNANT NEOPLASM OF UNSPECIFIED SITE OF LEFT FEMALE BREAST: ICD-10-CM

## 2024-07-30 DIAGNOSIS — J44.9 CHRONIC OBSTRUCTIVE PULMONARY DISEASE, UNSPECIFIED: ICD-10-CM

## 2024-07-30 DIAGNOSIS — Z98.890 OTHER SPECIFIED POSTPROCEDURAL STATES: ICD-10-CM

## 2024-07-30 DIAGNOSIS — C50.912 MALIGNANT NEOPLASM OF UNSPECIFIED SITE OF LEFT FEMALE BREAST: ICD-10-CM

## 2024-07-30 DIAGNOSIS — I71.9 AORTIC ANEURYSM OF UNSPECIFIED SITE, W/OUT RUPTURE: ICD-10-CM

## 2024-07-30 DIAGNOSIS — R93.3 ABNORMAL FINDINGS ON DIAGNOSTIC IMAGING OF OTHER PARTS OF DIGESTIVE TRACT: ICD-10-CM

## 2024-07-30 DIAGNOSIS — Z86.79 OTHER SPECIFIED POSTPROCEDURAL STATES: ICD-10-CM

## 2024-07-30 DIAGNOSIS — E11.9 TYPE 2 DIABETES MELLITUS W/OUT COMPLICATIONS: ICD-10-CM

## 2024-07-30 DIAGNOSIS — E78.5 HYPERLIPIDEMIA, UNSPECIFIED: ICD-10-CM

## 2024-07-30 DIAGNOSIS — I48.91 UNSPECIFIED ATRIAL FIBRILLATION: ICD-10-CM

## 2024-07-30 PROCEDURE — 99205 OFFICE O/P NEW HI 60 MIN: CPT

## 2024-07-30 RX ORDER — POLYETHYLENE GLYCOL 3350 AND ELECTROLYTES WITH LEMON FLAVOR 236; 22.74; 6.74; 5.86; 2.97 G/4L; G/4L; G/4L; G/4L; G/4L
236 POWDER, FOR SOLUTION ORAL
Qty: 1 | Refills: 0 | Status: ACTIVE | COMMUNITY
Start: 2024-07-30 | End: 1900-01-01

## 2024-07-30 NOTE — HISTORY OF PRESENT ILLNESS
[FreeTextEntry1] : 67 y/o MOTHER of three with Hx of valvular heart disease s/p bioprosthetic valve replacement, s/p aortic aneurysm repair, left Breast cancer 6/2024, DM2, hyperlipidemia, COPD, active smoker who is referred for abnormal imaging.  She was last seen in 8/2019 for positive FIT (did not have colonoscopy at the time) - she was seen by cardiology prior to colonoscopy and found to have aortic aneurysm that was then repaired.   PET 7/17/2024:  Avid left breast mass at 5:00, avid left axillary lymph node. No FDG avid distant mets. Focal FDG activity in mid distant colon with no CT correlate.  Feels constipated however when eating almond she has better bowel movement - has a BM every day.  -She attributes constipation to Ozempic.   She has lost since being on ozempic - lost 25 lbs initially and the gained 10 lbs.   Patient denies having abdominal pain, diarrhea, melena and hematochezia.   Initial office visit 8/2019:  Recent blood test in 8/2019 showed H/H of 14.6/45.8. MCV 91.1. Has occasional slight discomfort in rectum. 2 to 3 times a day goes to the bathroom for years. She has to push at times to have a bowel movement. Lost 11 pounds recently intensionally. Pt denies having abdominal pain, heartburn, dysphagia, odynophagia, nausea, vomiting, fevers, chills, jaundice, loss of appetite, diarrhea, hematochezia and melena. Mom had liver cancer. Patient denies family Hx of other GI cancers.   Discussion/Summary 8/2019 Patient's daughter (Merlyn) called and says she spoke with her mom and her mom wants both an endoscopy and colonoscopy since blood seen in stool. risks and potential complications have been discussed and she is willing to proceed. Will have staff arrange.

## 2024-07-30 NOTE — HISTORY OF PRESENT ILLNESS
[FreeTextEntry1] : 69 y/o MOTHER of three with Hx of valvular heart disease s/p bioprosthetic valve replacement, s/p aortic aneurysm repair, left Breast cancer 6/2024, DM2, hyperlipidemia, COPD, active smoker who is referred for abnormal imaging.  She was last seen in 8/2019 for positive FIT (did not have colonoscopy at the time) - she was seen by cardiology prior to colonoscopy and found to have aortic aneurysm that was then repaired.   PET 7/17/2024:  Avid left breast mass at 5:00, avid left axillary lymph node. No FDG avid distant mets. Focal FDG activity in mid distant colon with no CT correlate.  Feels constipated however when eating almond she has better bowel movement - has a BM every day.  -She attributes constipation to Ozempic.   She has lost since being on ozempic - lost 25 lbs initially and the gained 10 lbs.   Patient denies having abdominal pain, diarrhea, melena and hematochezia.   Initial office visit 8/2019:  Recent blood test in 8/2019 showed H/H of 14.6/45.8. MCV 91.1. Has occasional slight discomfort in rectum. 2 to 3 times a day goes to the bathroom for years. She has to push at times to have a bowel movement. Lost 11 pounds recently intensionally. Pt denies having abdominal pain, heartburn, dysphagia, odynophagia, nausea, vomiting, fevers, chills, jaundice, loss of appetite, diarrhea, hematochezia and melena. Mom had liver cancer. Patient denies family Hx of other GI cancers.   Discussion/Summary 8/2019 Patient's daughter (Merlyn) called and says she spoke with her mom and her mom wants both an endoscopy and colonoscopy since blood seen in stool. risks and potential complications have been discussed and she is willing to proceed. Will have staff arrange.

## 2024-07-30 NOTE — PHYSICAL EXAM
[Bowel Sounds] : normal bowel sounds [Abdomen Tenderness] : non-tender [No Masses] : no abdominal mass palpated [Abdomen Soft] : soft [] : no hepatosplenomegaly [Normal] : oriented to person, place, and time

## 2024-07-30 NOTE — ASSESSMENT
[FreeTextEntry1] : IMPRESSION: #  Abnormal activity in the mid distant colon on PET/CT 7/17/2024   -  PET 7/17/2024:  Avid left breast mass at 5:00, avid left axillary lymph node. No FDG avid distant mets. Focal FDG activity in mid distant colon with no CT correlate. -  Never had colonoscopy   #  Mom had liver cancer. Patient denies family Hx of other GI cancers.  #  Comorbidities:  Valvular heart disease s/p bioprosthetic valve replacement, s/p aortic aneurysm repair, DM2, hyperlipidemia, COPD, active smoker, newly diagnosed left Breast cancer 6/2024     PLAN I have advised the patient to arrange for a colonoscopy to rule out colon polyps, colorectal cancer etc. under monitored anesthesia care.  Risks such as perforation requiring surgery, colostomy, bleeding requiring blood transfusion, infection/sepsis, diverticulitis, colitis, missed colon cancer (2% to 6%), internal organ injury such as splenic hemorrhage (1/6000, risk thin, female gender) etc, adverse reaction to medication etc. and risks of anesthesia including cardiopulmonary compromise requiring ICU care were discussed with patient.  Alternatives were discussed.  Patient verbalized understanding and agrees to proceed with a colonoscopy under anesthesia.  She will need to arrange for colonoscopy within 4 weeks.   She will need presurgical clearance, cardiology clearance.  May continue aspirin - can hold fish oil a week before procedure.   Hold Ozempic more than 7 days prior to procedure.

## 2024-08-01 ENCOUNTER — NON-APPOINTMENT (OUTPATIENT)
Age: 69
End: 2024-08-01

## 2024-08-01 ENCOUNTER — RESULT REVIEW (OUTPATIENT)
Age: 69
End: 2024-08-01

## 2024-08-01 ENCOUNTER — APPOINTMENT (OUTPATIENT)
Dept: MRI IMAGING | Facility: CLINIC | Age: 69
End: 2024-08-01
Payer: COMMERCIAL

## 2024-08-01 ENCOUNTER — APPOINTMENT (OUTPATIENT)
Dept: ULTRASOUND IMAGING | Facility: CLINIC | Age: 69
End: 2024-08-01

## 2024-08-01 PROCEDURE — A4648: CPT

## 2024-08-01 PROCEDURE — 19083 BX BREAST 1ST LESION US IMAG: CPT | Mod: LT

## 2024-08-01 PROCEDURE — A9585: CPT

## 2024-08-01 PROCEDURE — 77065 DX MAMMO INCL CAD UNI: CPT | Mod: LT

## 2024-08-01 PROCEDURE — 19085Z: CUSTOM | Mod: LT

## 2024-08-05 PROBLEM — Z13.79 GENETIC TESTING: Status: ACTIVE | Noted: 2024-07-05

## 2024-08-06 ENCOUNTER — OUTPATIENT (OUTPATIENT)
Dept: OUTPATIENT SERVICES | Facility: HOSPITAL | Age: 69
LOS: 1 days | End: 2024-08-06
Payer: COMMERCIAL

## 2024-08-06 ENCOUNTER — NON-APPOINTMENT (OUTPATIENT)
Age: 69
End: 2024-08-06

## 2024-08-06 VITALS
HEART RATE: 82 BPM | SYSTOLIC BLOOD PRESSURE: 117 MMHG | OXYGEN SATURATION: 96 % | TEMPERATURE: 98 F | DIASTOLIC BLOOD PRESSURE: 65 MMHG | RESPIRATION RATE: 16 BRPM | WEIGHT: 220.02 LBS

## 2024-08-06 DIAGNOSIS — Z12.11 ENCOUNTER FOR SCREENING FOR MALIGNANT NEOPLASM OF COLON: ICD-10-CM

## 2024-08-06 DIAGNOSIS — Z95.2 PRESENCE OF PROSTHETIC HEART VALVE: Chronic | ICD-10-CM

## 2024-08-06 DIAGNOSIS — Z01.818 ENCOUNTER FOR OTHER PREPROCEDURAL EXAMINATION: ICD-10-CM

## 2024-08-06 DIAGNOSIS — Z86.79 PERSONAL HISTORY OF OTHER DISEASES OF THE CIRCULATORY SYSTEM: ICD-10-CM

## 2024-08-06 DIAGNOSIS — Z98.890 OTHER SPECIFIED POSTPROCEDURAL STATES: Chronic | ICD-10-CM

## 2024-08-06 DIAGNOSIS — E11.9 TYPE 2 DIABETES MELLITUS WITHOUT COMPLICATIONS: ICD-10-CM

## 2024-08-06 LAB
ALBUMIN SERPL ELPH-MCNC: 3.5 G/DL — SIGNIFICANT CHANGE UP (ref 3.3–5)
ALP SERPL-CCNC: 85 U/L — SIGNIFICANT CHANGE UP (ref 40–120)
ALT FLD-CCNC: 25 U/L — SIGNIFICANT CHANGE UP (ref 12–78)
ANION GAP SERPL CALC-SCNC: 4 MMOL/L — LOW (ref 5–17)
AST SERPL-CCNC: 17 U/L — SIGNIFICANT CHANGE UP (ref 15–37)
BILIRUB SERPL-MCNC: 0.3 MG/DL — SIGNIFICANT CHANGE UP (ref 0.2–1.2)
BUN SERPL-MCNC: 11 MG/DL — SIGNIFICANT CHANGE UP (ref 7–23)
CALCIUM SERPL-MCNC: 8.7 MG/DL — SIGNIFICANT CHANGE UP (ref 8.5–10.1)
CHLORIDE SERPL-SCNC: 104 MMOL/L — SIGNIFICANT CHANGE UP (ref 96–108)
CO2 SERPL-SCNC: 28 MMOL/L — SIGNIFICANT CHANGE UP (ref 22–31)
CREAT SERPL-MCNC: 0.5 MG/DL — SIGNIFICANT CHANGE UP (ref 0.5–1.3)
EGFR: 101 ML/MIN/1.73M2 — SIGNIFICANT CHANGE UP
GLUCOSE SERPL-MCNC: 93 MG/DL — SIGNIFICANT CHANGE UP (ref 70–99)
HCT VFR BLD CALC: 40.6 % — SIGNIFICANT CHANGE UP (ref 34.5–45)
HGB BLD-MCNC: 13.4 G/DL — SIGNIFICANT CHANGE UP (ref 11.5–15.5)
MCHC RBC-ENTMCNC: 29.2 PG — SIGNIFICANT CHANGE UP (ref 27–34)
MCHC RBC-ENTMCNC: 33 GM/DL — SIGNIFICANT CHANGE UP (ref 32–36)
MCV RBC AUTO: 88.5 FL — SIGNIFICANT CHANGE UP (ref 80–100)
NRBC # BLD: 0 /100 WBCS — SIGNIFICANT CHANGE UP (ref 0–0)
PLATELET # BLD AUTO: 293 K/UL — SIGNIFICANT CHANGE UP (ref 150–400)
POTASSIUM SERPL-MCNC: 4.2 MMOL/L — SIGNIFICANT CHANGE UP (ref 3.5–5.3)
POTASSIUM SERPL-SCNC: 4.2 MMOL/L — SIGNIFICANT CHANGE UP (ref 3.5–5.3)
PROT SERPL-MCNC: 7.2 G/DL — SIGNIFICANT CHANGE UP (ref 6–8.3)
RBC # BLD: 4.59 M/UL — SIGNIFICANT CHANGE UP (ref 3.8–5.2)
RBC # FLD: 15.1 % — HIGH (ref 10.3–14.5)
SODIUM SERPL-SCNC: 136 MMOL/L — SIGNIFICANT CHANGE UP (ref 135–145)
WBC # BLD: 8.67 K/UL — SIGNIFICANT CHANGE UP (ref 3.8–10.5)
WBC # FLD AUTO: 8.67 K/UL — SIGNIFICANT CHANGE UP (ref 3.8–10.5)

## 2024-08-06 PROCEDURE — G0463: CPT

## 2024-08-06 PROCEDURE — 83036 HEMOGLOBIN GLYCOSYLATED A1C: CPT

## 2024-08-06 PROCEDURE — 85027 COMPLETE CBC AUTOMATED: CPT

## 2024-08-06 PROCEDURE — 36415 COLL VENOUS BLD VENIPUNCTURE: CPT

## 2024-08-06 PROCEDURE — 80053 COMPREHEN METABOLIC PANEL: CPT

## 2024-08-06 RX ORDER — MULTIVITAMIN WITH MINERALS
1 SYRUP ORAL
Refills: 0 | DISCHARGE

## 2024-08-06 RX ORDER — ATORVASTATIN CALCIUM 40 MG/1
1 TABLET, FILM COATED ORAL
Refills: 0 | DISCHARGE

## 2024-08-06 RX ORDER — ORAL SEMAGLUTIDE 14 MG/1
0.5 TABLET ORAL
Refills: 0 | DISCHARGE

## 2024-08-06 RX ORDER — ASPIRIN 500 MG
0 TABLET ORAL
Refills: 0 | DISCHARGE

## 2024-08-06 RX ORDER — METOPROLOL TARTRATE 100 MG
1 TABLET ORAL
Refills: 0 | DISCHARGE

## 2024-08-06 NOTE — H&P PST ADULT - HISTORY OF PRESENT ILLNESS
64 yo female, PMH HTN, HLD, COPD, DM2, + Cologuard test, going through cardiac clearance for colonoscopy, Echocardiogram revealed an aortic root 5.6 cm ascending aorta 5.8 cm. Pt. referred to CT surgery and presents to PST for scheduled Aortic Root Replacement with Ascending Aorta Replacement/Aortic Valve Repair on 11/12/19. Pt. reports feeling well since actively trying to lose weight and has lost 30 lbs since August, occasional PIEDRA and swelling of LE. Denies back pain, chest pain, palpitations recent fever, chills, changes in bowel/urinary habits. 69 y/o female with PMH of HTN, Type 2 DM and Left breast cancer here for PST. Pt s/p PET scan on 7/17/24 and was found to have focal FDG activity in mid distant colon. Pt has never had a prior colonoscopy. Pt denies n/v/d and current abdominal pain. Pt electing for colonoscopy on 8/9/24.

## 2024-08-06 NOTE — H&P PST ADULT - PROBLEM SELECTOR PLAN 3
Instructed pt of need for endocrine clearance if HgbA1c is 9 or greater. No further Ozempic before colonoscopy. Pt verbalized understanding.

## 2024-08-06 NOTE — H&P PST ADULT - PROBLEM SELECTOR PLAN 4
CBC, Comprehensive panel and HgbA1c ordered. EKG from 6/24/24 in chart. Pre-op instructions given along with reminder for bowel prep as prescribed by GI reinforced. Pt  verbalized understanding.

## 2024-08-06 NOTE — H&P PST ADULT - PRO TOBACCO QUIT READY
Pt stopped smoking 8 months in 2019/not motivated to quit Pt stopped smoking for 8 months in 2019/not motivated to quit

## 2024-08-06 NOTE — H&P PST ADULT - PROBLEM SELECTOR PLAN 1
Cardiac clearance needed and received in chart. The patient does not require anticoagulation for their valvular disease.

## 2024-08-06 NOTE — H&P PST ADULT - NSICDXPASTMEDICALHX_GEN_ALL_CORE_FT
PAST MEDICAL HISTORY:  Arthritis     Breast cancer     COPD, mild Singulair at night, no nebulizers    DM2 (diabetes mellitus, type 2)     H/O aortic aneurysm Dx 10/2019    High cholesterol     Hypertension     Lung nodule Dx 2016, left 3mm    Obesity     Renal cyst      PAST MEDICAL HISTORY:  Arthritis     Breast cancer     DM2 (diabetes mellitus, type 2)     Encounter for screening for malignant neoplasm of colon     H/O aortic aneurysm Dx 10/2019    High cholesterol     Hypertension     Lung nodule Dx 2016, left 3mm    Obesity     Renal cyst

## 2024-08-07 PROBLEM — Z87.891 PERSONAL HISTORY OF NICOTINE DEPENDENCE: Chronic | Status: INACTIVE | Noted: 2019-10-07 | Resolved: 2024-08-06

## 2024-08-07 PROBLEM — J44.9 CHRONIC OBSTRUCTIVE PULMONARY DISEASE, UNSPECIFIED: Chronic | Status: INACTIVE | Noted: 2019-11-07 | Resolved: 2024-08-06

## 2024-08-07 PROBLEM — N84.0 POLYP OF CORPUS UTERI: Chronic | Status: INACTIVE | Noted: 2018-01-25 | Resolved: 2024-08-06

## 2024-08-07 LAB
A1C WITH ESTIMATED AVERAGE GLUCOSE RESULT: 5.6 % — SIGNIFICANT CHANGE UP (ref 4–5.6)
ESTIMATED AVERAGE GLUCOSE: 114 MG/DL — SIGNIFICANT CHANGE UP (ref 68–114)

## 2024-08-09 ENCOUNTER — APPOINTMENT (OUTPATIENT)
Dept: GASTROENTEROLOGY | Facility: HOSPITAL | Age: 69
End: 2024-08-09

## 2024-08-09 ENCOUNTER — OUTPATIENT (OUTPATIENT)
Dept: OUTPATIENT SERVICES | Facility: HOSPITAL | Age: 69
LOS: 1 days | End: 2024-08-09
Payer: COMMERCIAL

## 2024-08-09 ENCOUNTER — RESULT REVIEW (OUTPATIENT)
Age: 69
End: 2024-08-09

## 2024-08-09 ENCOUNTER — TRANSCRIPTION ENCOUNTER (OUTPATIENT)
Age: 69
End: 2024-08-09

## 2024-08-09 DIAGNOSIS — Z12.11 ENCOUNTER FOR SCREENING FOR MALIGNANT NEOPLASM OF COLON: ICD-10-CM

## 2024-08-09 DIAGNOSIS — Z01.818 ENCOUNTER FOR OTHER PREPROCEDURAL EXAMINATION: ICD-10-CM

## 2024-08-09 DIAGNOSIS — Z95.2 PRESENCE OF PROSTHETIC HEART VALVE: Chronic | ICD-10-CM

## 2024-08-09 DIAGNOSIS — Z98.890 OTHER SPECIFIED POSTPROCEDURAL STATES: Chronic | ICD-10-CM

## 2024-08-09 LAB — GLUCOSE BLDC GLUCOMTR-MCNC: 110 MG/DL — HIGH (ref 70–99)

## 2024-08-09 PROCEDURE — 88305 TISSUE EXAM BY PATHOLOGIST: CPT | Mod: 26

## 2024-08-09 PROCEDURE — 45385 COLONOSCOPY W/LESION REMOVAL: CPT

## 2024-08-09 PROCEDURE — 82962 GLUCOSE BLOOD TEST: CPT

## 2024-08-09 PROCEDURE — 45380 COLONOSCOPY AND BIOPSY: CPT | Mod: XS

## 2024-08-09 PROCEDURE — 88305 TISSUE EXAM BY PATHOLOGIST: CPT

## 2024-08-12 ENCOUNTER — NON-APPOINTMENT (OUTPATIENT)
Age: 69
End: 2024-08-12

## 2024-08-12 LAB — SURGICAL PATHOLOGY STUDY: SIGNIFICANT CHANGE UP

## 2024-08-13 PROBLEM — Z12.11 ENCOUNTER FOR SCREENING FOR MALIGNANT NEOPLASM OF COLON: Chronic | Status: ACTIVE | Noted: 2024-08-06

## 2024-08-13 PROBLEM — C50.919 MALIGNANT NEOPLASM OF UNSPECIFIED SITE OF UNSPECIFIED FEMALE BREAST: Chronic | Status: ACTIVE | Noted: 2024-08-06

## 2024-08-16 ENCOUNTER — RESULT REVIEW (OUTPATIENT)
Age: 69
End: 2024-08-16

## 2024-08-16 LAB — SURGICAL PATHOLOGY STUDY: SIGNIFICANT CHANGE UP

## 2024-08-16 PROCEDURE — 88321 CONSLTJ&REPRT SLD PREP ELSWR: CPT

## 2024-08-16 NOTE — CONSULT LETTER
[Dear  ___] : Dear  [unfilled], [Consult Letter:] : I had the pleasure of evaluating your patient, [unfilled]. [Sincerely,] : Sincerely, [DrBarbie  ___] : Dr. ZUÑIGA

## 2024-08-19 ENCOUNTER — APPOINTMENT (OUTPATIENT)
Dept: BREAST CENTER | Facility: CLINIC | Age: 69
End: 2024-08-19
Payer: COMMERCIAL

## 2024-08-19 VITALS
HEIGHT: 63 IN | SYSTOLIC BLOOD PRESSURE: 106 MMHG | BODY MASS INDEX: 39.16 KG/M2 | HEART RATE: 87 BPM | DIASTOLIC BLOOD PRESSURE: 73 MMHG | WEIGHT: 221 LBS

## 2024-08-19 PROCEDURE — 99215 OFFICE O/P EST HI 40 MIN: CPT

## 2024-08-19 NOTE — PHYSICAL EXAM
[EOMI] : extra ocular movement intact [Sclera nonicteric] : sclera nonicteric [Supple] : supple [No Supraclavicular Adenopathy] : no supraclavicular adenopathy [No Thyromegaly] : no thyromegaly [No Cervical Adenopathy] : no cervical adenopathy [Clear to Auscultation Bilat] : clear to auscultation bilaterally [Normal Sinus Rhythm] : normal sinus rhythm [Examined in the supine and seated position] : examined in the supine and seated position [Bra Size: ___] : Bra Size: [unfilled] [No dominant masses] : no dominant masses in right breast  [No dominant masses] : no dominant masses left breast [No Nipple Retraction] : no left nipple retraction [No Nipple Discharge] : no left nipple discharge [No Axillary Lymphadenopathy] : no right axillary lymphadenopathy [Soft] : abdomen soft [Not Tender] : non-tender [No Palpable Masses] : no abdominal mass palpated [de-identified] : Resolving ecchymoses lower outer. [de-identified] : 2 cm mobile node lower lateral axilla.  Suggestion of second smaller node mid axilla. [de-identified] : Right subcostal scar. [de-identified] : Right hand dominance

## 2024-08-19 NOTE — DATA REVIEWED
[FreeTextEntry1] : Bilateral mammogram 5/3/2024:  Right asymmetry, rec additional imaging, Left focal asymmetry with questionable distortion outer lower, rec additional imaging.  Bilateral mammogram 6/5/2024:  Asymmetry lateral right effaces, focal asymmetry lower outer left with distortion persists 23x15 mm  Bilateral breast ultrasound 6/5/2024:  Left 3 N8 hypoechoic irregular mass [id number], rec biopsy, left 5N10 [id number] mm irregular hypoechoic mass, rec biopsy, 1 axillary node with possible mild eccentric cortical thickening up to 4 mm, rec biopsy  Pathology 6/17/2024:  Left 3N8= benign breast tissue with fibrous stroma (X  clip)                                     Left 5N10= invasive lobular cancer, grade 2 (SBR 6/9), LCIS, ER >90% NE >90% Her 2 2+, CISH nonamplified ( Vision clip)                                     Left axillary node= metastatic carcinoma (enhanced coil clip)  Bilateral breast MRI 7/16/2024:  Known biopsy-proven approximately 3.6 cm dominant mass in the left 5:00 location extending to the 6:00 location with tiny foci for which a wide excision surrounding the mass especially medially and inferiorly is recommended to assure adequate excision of the tiny foci extending medial to the dominant mass.  Susceptibility artifact in association with site of prior benign biopsy and ultrasound of nodule in the left 3:00 location in the mid plane at site of biopsy of nodule in the left 3:00 location 8 cm from the nipple.  6 mm nodule in the outer anterior left breast for which a left breast MRI guided biopsy is recommended. An additional irregular 7 mm mass was reported in the left 4:00 location 9 cm from the nipple on ultrasound dated 6/17/2024 which can be biopsied using ultrasound guidance.  Pathology 8/1/2024:  Left MRI biopsy=  fatty tissue with focal fibrosis (INFINITY CLIP)                  8/1/2024  Left 4N9 US core= (RIBBON clip) invasive mod diff lobular cancer 16 mm, % NE 40% Her 2 2+ CISH nonamplified  PET/CT 7/17/2024:  1. FDG avid soft tissue with biopsy clip in the inferior aspect of the left breast correlates to the malignant 5:00 lesion. Smaller tissue with mild activity and microclip corresponds to benign 3:00 biopsied lesion.  2. FDG avid left axillary node compatible with biopsy-proven axillary raina metastasis. No suspicious FDG avid malignant bone lesion.  3. Focal activity in the mid transverse colon without low-dose CT abnormality; correlation with recent colonoscopy is recommended.

## 2024-08-19 NOTE — PAST MEDICAL HISTORY
[Postmenopausal] : The patient is postmenopausal [Menarche Age ____] : age at menarche was [unfilled] [Approximately ___] : the LMP was approximately [unfilled] [Total Preg ___] : G[unfilled] [Live Births ___] : P[unfilled]  [Age At Live Birth ___] : Age at live birth: [unfilled] [History of Hormone Replacement Treatment] : has no history of hormone replacement treatment [FreeTextEntry2] : 2 daughters, son [FreeTextEntry7] : None [FreeTextEntry8] : 6 months

## 2024-08-19 NOTE — HISTORY OF PRESENT ILLNESS
[FreeTextEntry1] : This is a 68 year old female who was found to have abnormalities on her first mammogram in 5/2024.  A biopsy of the left breast showed invasive lobular cancer, ER>90% NM>90% Her 2 2+ CISH nonamplified.  Oncotype score is 9.  A biopsy of a left axillary node was positive also.  A second site in the left breast that was biopsied was benign. She was seen on July 5, 2024.  An MRI showed two additional abnormalities in the left breast.  MRI biopsy was benign.  An US guided biopsy showed invasive cancer that is very close to the original site.  She had an aortic root and valve replacement (cow valve) in 2019. She had rapid A-fib after that resolved with cardioversion.  She follows with Dr. Noé Rome.  She met with Dr. Engle and it was advised she proceed with surgery. She returns for additional discussion of surgical options.

## 2024-08-19 NOTE — DATA REVIEWED
[FreeTextEntry1] : Bilateral mammogram 5/3/2024:  Right asymmetry, rec additional imaging, Left focal asymmetry with questionable distortion outer lower, rec additional imaging.  Bilateral mammogram 6/5/2024:  Asymmetry lateral right effaces, focal asymmetry lower outer left with distortion persists 23x15 mm  Bilateral breast ultrasound 6/5/2024:  Left 3 N8 hypoechoic irregular mass [id number], rec biopsy, left 5N10 [id number] mm irregular hypoechoic mass, rec biopsy, 1 axillary node with possible mild eccentric cortical thickening up to 4 mm, rec biopsy  Pathology 6/17/2024:  Left 3N8= benign breast tissue with fibrous stroma (X  clip)                                     Left 5N10= invasive lobular cancer, grade 2 (SBR 6/9), LCIS, ER >90% CA >90% Her 2 2+, CISH nonamplified ( Vision clip)                                     Left axillary node= metastatic carcinoma (enhanced coil clip)  Bilateral breast MRI 7/16/2024:  Known biopsy-proven approximately 3.6 cm dominant mass in the left 5:00 location extending to the 6:00 location with tiny foci for which a wide excision surrounding the mass especially medially and inferiorly is recommended to assure adequate excision of the tiny foci extending medial to the dominant mass.  Susceptibility artifact in association with site of prior benign biopsy and ultrasound of nodule in the left 3:00 location in the mid plane at site of biopsy of nodule in the left 3:00 location 8 cm from the nipple.  6 mm nodule in the outer anterior left breast for which a left breast MRI guided biopsy is recommended. An additional irregular 7 mm mass was reported in the left 4:00 location 9 cm from the nipple on ultrasound dated 6/17/2024 which can be biopsied using ultrasound guidance.  Pathology 8/1/2024:  Left MRI biopsy=  fatty tissue with focal fibrosis (INFINITY CLIP)                  8/1/2024  Left 4N9 US core= (RIBBON clip) invasive mod diff lobular cancer 16 mm, % CA 40% Her 2 2+ CISH nonamplified  PET/CT 7/17/2024:  1. FDG avid soft tissue with biopsy clip in the inferior aspect of the left breast correlates to the malignant 5:00 lesion. Smaller tissue with mild activity and microclip corresponds to benign 3:00 biopsied lesion.  2. FDG avid left axillary node compatible with biopsy-proven axillary raina metastasis. No suspicious FDG avid malignant bone lesion.  3. Focal activity in the mid transverse colon without low-dose CT abnormality; correlation with recent colonoscopy is recommended.

## 2024-08-19 NOTE — HISTORY OF PRESENT ILLNESS
[FreeTextEntry1] : This is a 68 year old female who was found to have abnormalities on her first mammogram in 5/2024.  A biopsy of the left breast showed invasive lobular cancer, ER>90% ME>90% Her 2 2+ CISH nonamplified.  Oncotype score is 9.  A biopsy of a left axillary node was positive also.  A second site in the left breast that was biopsied was benign. She was seen on July 5, 2024.  An MRI showed two additional abnormalities in the left breast.  MRI biopsy was benign.  An US guided biopsy showed invasive cancer that is very close to the original site.  She had an aortic root and valve replacement (cow valve) in 2019. She had rapid A-fib after that resolved with cardioversion.  She follows with Dr. Noé Rome.  She met with Dr. Engle and it was advised she proceed with surgery. She returns for additional discussion of surgical options.

## 2024-08-19 NOTE — PHYSICAL EXAM
[EOMI] : extra ocular movement intact [Sclera nonicteric] : sclera nonicteric [Supple] : supple [No Supraclavicular Adenopathy] : no supraclavicular adenopathy [No Cervical Adenopathy] : no cervical adenopathy [No Thyromegaly] : no thyromegaly [Clear to Auscultation Bilat] : clear to auscultation bilaterally [Normal Sinus Rhythm] : normal sinus rhythm [Examined in the supine and seated position] : examined in the supine and seated position [Bra Size: ___] : Bra Size: [unfilled] [No dominant masses] : no dominant masses in right breast  [No dominant masses] : no dominant masses left breast [No Nipple Retraction] : no left nipple retraction [No Nipple Discharge] : no left nipple discharge [No Axillary Lymphadenopathy] : no right axillary lymphadenopathy [Soft] : abdomen soft [Not Tender] : non-tender [No Palpable Masses] : no abdominal mass palpated [de-identified] : Resolving ecchymoses lower outer. [de-identified] : 2 cm mobile node lower lateral axilla.  Suggestion of second smaller node mid axilla. [de-identified] : Right subcostal scar. [de-identified] : Right hand dominance

## 2024-08-20 ENCOUNTER — APPOINTMENT (OUTPATIENT)
Dept: BREAST CENTER | Facility: CLINIC | Age: 69
End: 2024-08-20

## 2024-08-20 ENCOUNTER — NON-APPOINTMENT (OUTPATIENT)
Age: 69
End: 2024-08-20

## 2024-08-20 RX ORDER — ANASTROZOLE TABLETS 1 MG/1
1 TABLET ORAL DAILY
Qty: 30 | Refills: 4 | Status: ACTIVE | COMMUNITY
Start: 2024-08-20 | End: 1900-01-01

## 2024-08-23 ENCOUNTER — APPOINTMENT (OUTPATIENT)
Dept: PLASTIC SURGERY | Facility: CLINIC | Age: 69
End: 2024-08-23

## 2024-08-23 VITALS
DIASTOLIC BLOOD PRESSURE: 84 MMHG | SYSTOLIC BLOOD PRESSURE: 131 MMHG | OXYGEN SATURATION: 96 % | WEIGHT: 222 LBS | HEIGHT: 63 IN | BODY MASS INDEX: 39.34 KG/M2 | TEMPERATURE: 97.9 F | HEART RATE: 81 BPM

## 2024-08-23 DIAGNOSIS — N64.81 PTOSIS OF BREAST: ICD-10-CM

## 2024-08-23 DIAGNOSIS — C77.3 MALIGNANT NEOPLASM OF UNSPECIFIED SITE OF LEFT FEMALE BREAST: ICD-10-CM

## 2024-08-23 DIAGNOSIS — C50.912 MALIGNANT NEOPLASM OF UNSPECIFIED SITE OF LEFT FEMALE BREAST: ICD-10-CM

## 2024-08-23 PROCEDURE — 99204 OFFICE O/P NEW MOD 45 MIN: CPT

## 2024-08-26 PROBLEM — N64.81 BREAST PTOSIS: Status: ACTIVE | Noted: 2024-08-26

## 2024-08-26 NOTE — ADDENDUM
[FreeTextEntry1] :  I, David Beal, documented this note as a scribe on behalf of Dr. Lauren Shikowitz-Behr, MD on 08/23/2024.

## 2024-08-26 NOTE — END OF VISIT
[FreeTextEntry3] : All medical record entries made by the Scribe were at my, Dr. Lauren Shikowitz-Behr, MD, direction and personally dictated by me on 08/23/2024. I have reviewed the chart and agree that the record accurately reflects my personal performance of the history, physical exam, assessment and plan. I have also personally directed, reviewed, and agreed with the chart.

## 2024-08-26 NOTE — PHYSICAL EXAM
[NI] : Normal [de-identified] : NAD, AxOx3  [de-identified] : nonlabored breathing  [de-identified] : normal HR [de-identified] : RIGHT: SN-N 35.5, N-IMF 17, BW 18 LEFT: SN-N 36, N-IMF 17, BW 17 No masses, no nipple discharge nor retraction Grade 3 ptosis bilaterally  Healing left biopsy site at 1 o'clock  [de-identified] : no edema  [de-identified] : as above [de-identified] : grossly intact [de-identified] : normal affect

## 2024-08-26 NOTE — PHYSICAL EXAM
[NI] : Normal [de-identified] : NAD, AxOx3  [de-identified] : nonlabored breathing  [de-identified] : normal HR [de-identified] : RIGHT: SN-N 35.5, N-IMF 17, BW 18 LEFT: SN-N 36, N-IMF 17, BW 17 No masses, no nipple discharge nor retraction Grade 3 ptosis bilaterally  Healing left biopsy site at 1 o'clock  [de-identified] : no edema  [de-identified] : as above [de-identified] : grossly intact [de-identified] : normal affect

## 2024-08-26 NOTE — HISTORY OF PRESENT ILLNESS
[FreeTextEntry1] : OSWALD KULKARNI is a 68-year-old female presenting to the office today with a newly diagnosed left breast cancer. She is a candidate for breast conservative treatment. Patient is scheduled for a lumpectomy with Dr. Andrew Beltre. She presents today for her oncoplastic options at the time of surgery.   PMHx- DM, Atrial Fibrillation (s/p cardioversion)   PSHx- Cholecystectomy, foot surgery, Aortic valve replacement    Currently taking - Ozempic, Metoprolol, Aspirin, Anastrozole    Allergies- NKDA   Current smoker   Family history- denied    Work history- retired   Bra size- DD-DDD

## 2024-09-23 ENCOUNTER — OUTPATIENT (OUTPATIENT)
Dept: OUTPATIENT SERVICES | Facility: HOSPITAL | Age: 69
LOS: 1 days | End: 2024-09-23
Payer: COMMERCIAL

## 2024-09-23 DIAGNOSIS — Z01.818 ENCOUNTER FOR OTHER PREPROCEDURAL EXAMINATION: ICD-10-CM

## 2024-09-23 DIAGNOSIS — Z95.2 PRESENCE OF PROSTHETIC HEART VALVE: Chronic | ICD-10-CM

## 2024-09-23 DIAGNOSIS — Z98.890 OTHER SPECIFIED POSTPROCEDURAL STATES: Chronic | ICD-10-CM

## 2024-09-23 PROBLEM — C50.919 MALIGNANT NEOPLASM OF UNSPECIFIED SITE OF UNSPECIFIED FEMALE BREAST: Chronic | Status: INACTIVE | Noted: 2024-08-06 | Resolved: 2024-09-23

## 2024-09-23 LAB
A1C WITH ESTIMATED AVERAGE GLUCOSE RESULT: 5.7 % — HIGH (ref 4–5.6)
ABO RH CONFIRMATION: SIGNIFICANT CHANGE UP
ALBUMIN SERPL ELPH-MCNC: 3.4 G/DL — SIGNIFICANT CHANGE UP (ref 3.3–5)
ALP SERPL-CCNC: 86 U/L — SIGNIFICANT CHANGE UP (ref 40–120)
ALT FLD-CCNC: 34 U/L — SIGNIFICANT CHANGE UP (ref 12–78)
ANION GAP SERPL CALC-SCNC: 3 MMOL/L — LOW (ref 5–17)
APTT BLD: 31.8 SEC — SIGNIFICANT CHANGE UP (ref 24.5–35.6)
AST SERPL-CCNC: 20 U/L — SIGNIFICANT CHANGE UP (ref 15–37)
BASOPHILS # BLD AUTO: 0.03 K/UL — SIGNIFICANT CHANGE UP (ref 0–0.2)
BASOPHILS NFR BLD AUTO: 0.4 % — SIGNIFICANT CHANGE UP (ref 0–2)
BILIRUB SERPL-MCNC: 0.3 MG/DL — SIGNIFICANT CHANGE UP (ref 0.2–1.2)
BLD GP AB SCN SERPL QL: SIGNIFICANT CHANGE UP
BUN SERPL-MCNC: 11 MG/DL — SIGNIFICANT CHANGE UP (ref 7–23)
CALCIUM SERPL-MCNC: 8.9 MG/DL — SIGNIFICANT CHANGE UP (ref 8.5–10.1)
CHLORIDE SERPL-SCNC: 104 MMOL/L — SIGNIFICANT CHANGE UP (ref 96–108)
CO2 SERPL-SCNC: 30 MMOL/L — SIGNIFICANT CHANGE UP (ref 22–31)
CREAT SERPL-MCNC: 0.52 MG/DL — SIGNIFICANT CHANGE UP (ref 0.5–1.3)
EGFR: 100 ML/MIN/1.73M2 — SIGNIFICANT CHANGE UP
EOSINOPHIL # BLD AUTO: 0.07 K/UL — SIGNIFICANT CHANGE UP (ref 0–0.5)
EOSINOPHIL NFR BLD AUTO: 0.8 % — SIGNIFICANT CHANGE UP (ref 0–6)
ESTIMATED AVERAGE GLUCOSE: 117 MG/DL — HIGH (ref 68–114)
GLUCOSE SERPL-MCNC: 86 MG/DL — SIGNIFICANT CHANGE UP (ref 70–99)
HCT VFR BLD CALC: 41.7 % — SIGNIFICANT CHANGE UP (ref 34.5–45)
HGB BLD-MCNC: 13.7 G/DL — SIGNIFICANT CHANGE UP (ref 11.5–15.5)
IMM GRANULOCYTES NFR BLD AUTO: 0.4 % — SIGNIFICANT CHANGE UP (ref 0–0.9)
INR BLD: 1.1 RATIO — SIGNIFICANT CHANGE UP (ref 0.85–1.18)
LYMPHOCYTES # BLD AUTO: 2.1 K/UL — SIGNIFICANT CHANGE UP (ref 1–3.3)
LYMPHOCYTES # BLD AUTO: 24.7 % — SIGNIFICANT CHANGE UP (ref 13–44)
MCHC RBC-ENTMCNC: 29.7 PG — SIGNIFICANT CHANGE UP (ref 27–34)
MCHC RBC-ENTMCNC: 32.9 GM/DL — SIGNIFICANT CHANGE UP (ref 32–36)
MCV RBC AUTO: 90.5 FL — SIGNIFICANT CHANGE UP (ref 80–100)
MONOCYTES # BLD AUTO: 0.6 K/UL — SIGNIFICANT CHANGE UP (ref 0–0.9)
MONOCYTES NFR BLD AUTO: 7.1 % — SIGNIFICANT CHANGE UP (ref 2–14)
NEUTROPHILS # BLD AUTO: 5.66 K/UL — SIGNIFICANT CHANGE UP (ref 1.8–7.4)
NEUTROPHILS NFR BLD AUTO: 66.6 % — SIGNIFICANT CHANGE UP (ref 43–77)
PLATELET # BLD AUTO: 298 K/UL — SIGNIFICANT CHANGE UP (ref 150–400)
POTASSIUM SERPL-MCNC: 4 MMOL/L — SIGNIFICANT CHANGE UP (ref 3.5–5.3)
POTASSIUM SERPL-SCNC: 4 MMOL/L — SIGNIFICANT CHANGE UP (ref 3.5–5.3)
PROT SERPL-MCNC: 7.2 GM/DL — SIGNIFICANT CHANGE UP (ref 6–8.3)
PROTHROM AB SERPL-ACNC: 12.4 SEC — SIGNIFICANT CHANGE UP (ref 9.5–13)
RBC # BLD: 4.61 M/UL — SIGNIFICANT CHANGE UP (ref 3.8–5.2)
RBC # FLD: 15.1 % — HIGH (ref 10.3–14.5)
SODIUM SERPL-SCNC: 137 MMOL/L — SIGNIFICANT CHANGE UP (ref 135–145)
WBC # BLD: 8.49 K/UL — SIGNIFICANT CHANGE UP (ref 3.8–10.5)
WBC # FLD AUTO: 8.49 K/UL — SIGNIFICANT CHANGE UP (ref 3.8–10.5)

## 2024-09-23 PROCEDURE — 93010 ELECTROCARDIOGRAM REPORT: CPT

## 2024-09-23 PROCEDURE — 93005 ELECTROCARDIOGRAM TRACING: CPT

## 2024-09-23 PROCEDURE — 86901 BLOOD TYPING SEROLOGIC RH(D): CPT

## 2024-09-23 PROCEDURE — 71046 X-RAY EXAM CHEST 2 VIEWS: CPT

## 2024-09-23 PROCEDURE — 86850 RBC ANTIBODY SCREEN: CPT

## 2024-09-23 PROCEDURE — 80053 COMPREHEN METABOLIC PANEL: CPT

## 2024-09-23 PROCEDURE — 71046 X-RAY EXAM CHEST 2 VIEWS: CPT | Mod: 26

## 2024-09-23 PROCEDURE — 85610 PROTHROMBIN TIME: CPT

## 2024-09-23 PROCEDURE — 36415 COLL VENOUS BLD VENIPUNCTURE: CPT

## 2024-09-23 PROCEDURE — 86900 BLOOD TYPING SEROLOGIC ABO: CPT

## 2024-09-23 PROCEDURE — 83036 HEMOGLOBIN GLYCOSYLATED A1C: CPT

## 2024-09-23 PROCEDURE — 85730 THROMBOPLASTIN TIME PARTIAL: CPT

## 2024-09-23 PROCEDURE — 85025 COMPLETE CBC W/AUTO DIFF WBC: CPT

## 2024-09-23 NOTE — ASU PATIENT PROFILE, ADULT - NSICDXPASTMEDICALHX_GEN_ALL_CORE_FT
PAST MEDICAL HISTORY:  Arthritis     Atrial fibrillation     Breast cancer, left with metastases to lymph node    Constipation     COPD (chronic obstructive pulmonary disease)     DM2 (diabetes mellitus, type 2)     Encounter for screening for malignant neoplasm of colon     H/O aortic aneurysm Dx 10/2019    High cholesterol     Hypertension     Lung nodule Dx 2016    Obesity     Renal cyst

## 2024-09-23 NOTE — ASU PATIENT PROFILE, ADULT - NSICDXPASTSURGICALHX_GEN_ALL_CORE_FT
PAST SURGICAL HISTORY:  Dental disorder teeth removal 2010    History of D&C 12/6/17 and 2018 for thickened endometrium    S/P AVR (aortic valve replacement)     S/P cholecystectomy 1988    S/P foot surgery bunionectomy right foot 2013  left foot 2013     PAST SURGICAL HISTORY:  Dental disorder teeth removal 2010    History of D&C 12/6/17 and 2018 for thickened endometrium    S/P ablation of atrial fibrillation     S/P AVR (aortic valve replacement)     S/P cholecystectomy 1988    S/P foot surgery bunionectomy right foot 2013  left foot 2013

## 2024-09-23 NOTE — CHART NOTE - NSCHARTNOTEFT_GEN_A_CORE
68 year old female presents to PST for planned magseed localization left breast lumpectomy latonya  localized left axillary node excision and left axillary sentinel node biopsy       Plan:  1. PST instructions given ; NPO status/  instructions to be given by ASU   2. Pt instructed to take following meds on day of surgery: anastrzole metoprolol   3. Pt instructed to take routine evening medications unless indicated   4. Stop NSAIDS ( Aspirin Alev Motrin Mobic Diclofenac), herbal supplements , MVI , Vitamin fish oil 7 days prior to surgery  unless   directed by surgeon or cardiologist;   5. Medical Optimization  with Dr Lagos and cardio Dr Rome   6. EZ wash instructions given & mupirocin instructions given  7. Labs EKG CXR as per surgeon request   8. Pt denies covid symptoms shortness of breath fever cough

## 2024-09-24 DIAGNOSIS — Z01.818 ENCOUNTER FOR OTHER PREPROCEDURAL EXAMINATION: ICD-10-CM

## 2024-09-25 PROBLEM — J44.9 CHRONIC OBSTRUCTIVE PULMONARY DISEASE, UNSPECIFIED: Chronic | Status: ACTIVE | Noted: 2024-09-23

## 2024-09-25 PROBLEM — I48.91 UNSPECIFIED ATRIAL FIBRILLATION: Chronic | Status: ACTIVE | Noted: 2024-09-23

## 2024-09-25 PROBLEM — C50.912 MALIGNANT NEOPLASM OF UNSPECIFIED SITE OF LEFT FEMALE BREAST: Chronic | Status: ACTIVE | Noted: 2024-09-23

## 2024-09-25 PROBLEM — K59.00 CONSTIPATION, UNSPECIFIED: Chronic | Status: ACTIVE | Noted: 2024-09-23

## 2024-10-02 ENCOUNTER — RESULT REVIEW (OUTPATIENT)
Age: 69
End: 2024-10-02

## 2024-10-03 ENCOUNTER — APPOINTMENT (OUTPATIENT)
Dept: ULTRASOUND IMAGING | Facility: CLINIC | Age: 69
End: 2024-10-03

## 2024-10-04 RX ORDER — CEFADROXIL 500 MG/1
500 CAPSULE ORAL TWICE DAILY
Qty: 14 | Refills: 0 | Status: ACTIVE | COMMUNITY
Start: 2024-10-04 | End: 1900-01-01

## 2024-10-04 RX ORDER — OXYCODONE 5 MG/1
5 TABLET ORAL
Qty: 12 | Refills: 0 | Status: ACTIVE | COMMUNITY
Start: 2024-10-04 | End: 1900-01-01

## 2024-10-04 RX ORDER — ONDANSETRON 4 MG/1
4 TABLET, ORALLY DISINTEGRATING ORAL
Qty: 9 | Refills: 0 | Status: ACTIVE | COMMUNITY
Start: 2024-10-04 | End: 1900-01-01

## 2024-10-08 ENCOUNTER — APPOINTMENT (OUTPATIENT)
Dept: ULTRASOUND IMAGING | Facility: CLINIC | Age: 69
End: 2024-10-08
Payer: COMMERCIAL

## 2024-10-08 ENCOUNTER — APPOINTMENT (OUTPATIENT)
Dept: ULTRASOUND IMAGING | Facility: CLINIC | Age: 69
End: 2024-10-08

## 2024-10-08 ENCOUNTER — OUTPATIENT (OUTPATIENT)
Dept: OUTPATIENT SERVICES | Facility: HOSPITAL | Age: 69
LOS: 1 days | End: 2024-10-08
Payer: COMMERCIAL

## 2024-10-08 DIAGNOSIS — Z95.2 PRESENCE OF PROSTHETIC HEART VALVE: Chronic | ICD-10-CM

## 2024-10-08 DIAGNOSIS — Z98.890 OTHER SPECIFIED POSTPROCEDURAL STATES: Chronic | ICD-10-CM

## 2024-10-08 DIAGNOSIS — C50.912 MALIGNANT NEOPLASM OF UNSPECIFIED SITE OF LEFT FEMALE BREAST: ICD-10-CM

## 2024-10-08 DIAGNOSIS — Z00.8 ENCOUNTER FOR OTHER GENERAL EXAMINATION: ICD-10-CM

## 2024-10-08 PROCEDURE — 19286 PERQ DEV BREAST ADD US IMAG: CPT

## 2024-10-08 PROCEDURE — A4648: CPT

## 2024-10-08 PROCEDURE — 19285 PERQ DEV BREAST 1ST US IMAG: CPT | Mod: LT

## 2024-10-08 PROCEDURE — 19285 PERQ DEV BREAST 1ST US IMAG: CPT

## 2024-10-08 PROCEDURE — C1739: CPT

## 2024-10-08 PROCEDURE — 19286 PERQ DEV BREAST ADD US IMAG: CPT | Mod: LT

## 2024-10-10 ENCOUNTER — APPOINTMENT (OUTPATIENT)
Dept: PLASTIC SURGERY | Facility: HOSPITAL | Age: 69
End: 2024-10-10

## 2024-10-10 ENCOUNTER — RESULT REVIEW (OUTPATIENT)
Age: 69
End: 2024-10-10

## 2024-10-10 ENCOUNTER — TRANSCRIPTION ENCOUNTER (OUTPATIENT)
Age: 69
End: 2024-10-10

## 2024-10-10 ENCOUNTER — APPOINTMENT (OUTPATIENT)
Dept: BREAST CENTER | Facility: HOSPITAL | Age: 69
End: 2024-10-10

## 2024-10-10 ENCOUNTER — OUTPATIENT (OUTPATIENT)
Dept: INPATIENT UNIT | Facility: HOSPITAL | Age: 69
LOS: 1 days | Discharge: HOME | DRG: 601 | End: 2024-10-10
Payer: COMMERCIAL

## 2024-10-10 VITALS
HEIGHT: 63 IN | OXYGEN SATURATION: 98 % | RESPIRATION RATE: 16 BRPM | DIASTOLIC BLOOD PRESSURE: 69 MMHG | SYSTOLIC BLOOD PRESSURE: 114 MMHG | TEMPERATURE: 98 F | WEIGHT: 227.08 LBS | HEART RATE: 81 BPM

## 2024-10-10 DIAGNOSIS — I71.9 AORTIC ANEURYSM OF UNSPECIFIED SITE, WITHOUT RUPTURE: ICD-10-CM

## 2024-10-10 DIAGNOSIS — Z79.85 LONG-TERM (CURRENT) USE OF INJECTABLE NON-INSULIN ANTIDIABETIC DRUGS: ICD-10-CM

## 2024-10-10 DIAGNOSIS — C50.912 MALIGNANT NEOPLASM OF UNSPECIFIED SITE OF LEFT FEMALE BREAST: ICD-10-CM

## 2024-10-10 DIAGNOSIS — N62 HYPERTROPHY OF BREAST: ICD-10-CM

## 2024-10-10 DIAGNOSIS — Z98.890 OTHER SPECIFIED POSTPROCEDURAL STATES: Chronic | ICD-10-CM

## 2024-10-10 DIAGNOSIS — C77.3 SECONDARY AND UNSPECIFIED MALIGNANT NEOPLASM OF AXILLA AND UPPER LIMB LYMPH NODES: ICD-10-CM

## 2024-10-10 DIAGNOSIS — E11.9 TYPE 2 DIABETES MELLITUS WITHOUT COMPLICATIONS: ICD-10-CM

## 2024-10-10 DIAGNOSIS — N60.21 FIBROADENOSIS OF RIGHT BREAST: ICD-10-CM

## 2024-10-10 DIAGNOSIS — F17.200 NICOTINE DEPENDENCE, UNSPECIFIED, UNCOMPLICATED: ICD-10-CM

## 2024-10-10 DIAGNOSIS — J44.9 CHRONIC OBSTRUCTIVE PULMONARY DISEASE, UNSPECIFIED: ICD-10-CM

## 2024-10-10 DIAGNOSIS — Z95.2 PRESENCE OF PROSTHETIC HEART VALVE: Chronic | ICD-10-CM

## 2024-10-10 DIAGNOSIS — I48.91 UNSPECIFIED ATRIAL FIBRILLATION: ICD-10-CM

## 2024-10-10 DIAGNOSIS — E78.5 HYPERLIPIDEMIA, UNSPECIFIED: ICD-10-CM

## 2024-10-10 LAB
GLUCOSE BLDC GLUCOMTR-MCNC: 133 MG/DL — HIGH (ref 70–99)
GLUCOSE BLDC GLUCOMTR-MCNC: 137 MG/DL — HIGH (ref 70–99)

## 2024-10-10 PROCEDURE — 88305 TISSUE EXAM BY PATHOLOGIST: CPT | Mod: 26

## 2024-10-10 PROCEDURE — 19318 BREAST REDUCTION: CPT | Mod: 50

## 2024-10-10 PROCEDURE — 88307 TISSUE EXAM BY PATHOLOGIST: CPT | Mod: 26

## 2024-10-10 PROCEDURE — 76098 X-RAY EXAM SURGICAL SPECIMEN: CPT | Mod: 26

## 2024-10-10 RX ORDER — ACETAMINOPHEN 325 MG
650 TABLET ORAL ONCE
Refills: 0 | Status: COMPLETED | OUTPATIENT
Start: 2024-10-10 | End: 2024-10-11

## 2024-10-10 RX ORDER — ANASTROZOLE 1 MG/1
1 TABLET ORAL
Refills: 0 | DISCHARGE

## 2024-10-10 RX ORDER — SODIUM CHLORIDE IRRIG SOLUTION 0.9 %
1000 SOLUTION, IRRIGATION IRRIGATION
Refills: 0 | Status: DISCONTINUED | OUTPATIENT
Start: 2024-10-10 | End: 2024-10-11

## 2024-10-10 RX ORDER — OXYCODONE HYDROCHLORIDE 30 MG/1
5 TABLET, FILM COATED, EXTENDED RELEASE ORAL ONCE
Refills: 0 | Status: DISCONTINUED | OUTPATIENT
Start: 2024-10-10 | End: 2024-10-10

## 2024-10-10 RX ORDER — OXYCODONE HYDROCHLORIDE 30 MG/1
10 TABLET, FILM COATED, EXTENDED RELEASE ORAL ONCE
Refills: 0 | Status: DISCONTINUED | OUTPATIENT
Start: 2024-10-10 | End: 2024-10-11

## 2024-10-10 RX ORDER — OXYCODONE HYDROCHLORIDE 30 MG/1
5 TABLET, FILM COATED, EXTENDED RELEASE ORAL ONCE
Refills: 0 | Status: DISCONTINUED | OUTPATIENT
Start: 2024-10-10 | End: 2024-10-11

## 2024-10-10 RX ORDER — ALCOHOL ANTISEPTIC PADS
15 PADS, MEDICATED (EA) TOPICAL ONCE
Refills: 0 | Status: DISCONTINUED | OUTPATIENT
Start: 2024-10-10 | End: 2024-10-11

## 2024-10-10 RX ORDER — ALCOHOL ANTISEPTIC PADS
12.5 PADS, MEDICATED (EA) TOPICAL ONCE
Refills: 0 | Status: DISCONTINUED | OUTPATIENT
Start: 2024-10-10 | End: 2024-10-11

## 2024-10-10 RX ORDER — HYDROMORPHONE HYDROCHLORIDE 1 MG/ML
0.5 INJECTION, SOLUTION INTRAMUSCULAR; INTRAVENOUS; SUBCUTANEOUS
Refills: 0 | Status: DISCONTINUED | OUTPATIENT
Start: 2024-10-10 | End: 2024-10-10

## 2024-10-10 RX ORDER — INSULIN LISPRO 100/ML
VIAL (ML) SUBCUTANEOUS AT BEDTIME
Refills: 0 | Status: DISCONTINUED | OUTPATIENT
Start: 2024-10-10 | End: 2024-10-11

## 2024-10-10 RX ORDER — SODIUM CHLORIDE IRRIG SOLUTION 0.9 %
1000 SOLUTION, IRRIGATION IRRIGATION
Refills: 0 | Status: DISCONTINUED | OUTPATIENT
Start: 2024-10-10 | End: 2024-10-10

## 2024-10-10 RX ORDER — ONDANSETRON HCL/PF 4 MG/2 ML
4 VIAL (ML) INJECTION ONCE
Refills: 0 | Status: DISCONTINUED | OUTPATIENT
Start: 2024-10-10 | End: 2024-10-10

## 2024-10-10 RX ORDER — CRANBERRY FRUIT EXTRACT 650 MG
0 CAPSULE ORAL
Refills: 0 | DISCHARGE

## 2024-10-10 RX ORDER — OXYCODONE HYDROCHLORIDE 30 MG/1
10 TABLET, FILM COATED, EXTENDED RELEASE ORAL EVERY 6 HOURS
Refills: 0 | Status: DISCONTINUED | OUTPATIENT
Start: 2024-10-10 | End: 2024-10-11

## 2024-10-10 RX ORDER — FENTANYL CITRATE-0.9 % NACL/PF 300MCG/30
25 PATIENT CONTROLLED ANALGESIA VIAL INJECTION
Refills: 0 | Status: DISCONTINUED | OUTPATIENT
Start: 2024-10-10 | End: 2024-10-10

## 2024-10-10 RX ORDER — GLUCAGON INJECTION, SOLUTION 0.5 MG/.1ML
1 INJECTION, SOLUTION SUBCUTANEOUS ONCE
Refills: 0 | Status: DISCONTINUED | OUTPATIENT
Start: 2024-10-10 | End: 2024-10-11

## 2024-10-10 RX ORDER — INSULIN LISPRO 100/ML
VIAL (ML) SUBCUTANEOUS
Refills: 0 | Status: DISCONTINUED | OUTPATIENT
Start: 2024-10-10 | End: 2024-10-11

## 2024-10-10 RX ORDER — ACETAMINOPHEN 325 MG
650 TABLET ORAL EVERY 6 HOURS
Refills: 0 | Status: DISCONTINUED | OUTPATIENT
Start: 2024-10-10 | End: 2024-10-11

## 2024-10-10 RX ORDER — ALCOHOL ANTISEPTIC PADS
25 PADS, MEDICATED (EA) TOPICAL ONCE
Refills: 0 | Status: DISCONTINUED | OUTPATIENT
Start: 2024-10-10 | End: 2024-10-11

## 2024-10-10 RX ORDER — HYDROMORPHONE HYDROCHLORIDE 1 MG/ML
0.5 INJECTION, SOLUTION INTRAMUSCULAR; INTRAVENOUS; SUBCUTANEOUS EVERY 6 HOURS
Refills: 0 | Status: DISCONTINUED | OUTPATIENT
Start: 2024-10-10 | End: 2024-10-11

## 2024-10-10 RX ORDER — PEDIATRIC MULTIVIT 61/D3/VIT K 1500-800
0 CAPSULE ORAL
Refills: 0 | DISCHARGE

## 2024-10-10 RX ORDER — OMEGA-3-ACID ETHYL ESTERS 1 G/1
1 CAPSULE, LIQUID FILLED ORAL
Refills: 0 | DISCHARGE

## 2024-10-10 NOTE — ASU DISCHARGE PLAN (ADULT/PEDIATRIC) - ASU DC SPECIAL INSTRUCTIONSFT
Keep incisions clean and dry for 48hrs. May shower after 48hrs  Keep back to shower water. Replace bra after showering  Keep surgical bra in place at all other times  Empty and record drain output twice a day  No heavy lifting or straining  Ambulate often  Followup with Dr. Miller in one week and Dr. Morales as previously scheduled Keep incisions clean and dry for 48hrs. May shower after 48hrs  Keep back to shower water. Replace bra after showering  Keep surgical bra in place at all other times  Empty and record drain outputs twice a day  No heavy lifting or straining  Ambulate often  Followup with Dr. Miller in one week and Dr. Morales as previously scheduled

## 2024-10-10 NOTE — BRIEF OPERATIVE NOTE - NSICDXBRIEFPROCEDURE_GEN_ALL_CORE_FT
PROCEDURES:  Left breast lumpectomy 10-Oct-2024 16:04:37  Nikky Dietz  Biopsy of left axillary node 10-Oct-2024 16:04:48  Nikky Dietz  
PROCEDURES:  Breast reduction, bilateral 10-Oct-2024 17:37:25  Ally Ray

## 2024-10-10 NOTE — BRIEF OPERATIVE NOTE - OPERATION/FINDINGS
SLN1 blue and radioactive, SLN 2 radioactive and originally positive node, SLN 3 radioactive, 2 clips and magseed seen on specimen image
as above

## 2024-10-10 NOTE — BRIEF OPERATIVE NOTE - NSICDXBRIEFPREOP_GEN_ALL_CORE_FT
PRE-OP DIAGNOSIS:  Breast cancer, left 10-Oct-2024 16:05:02  Nikky Dietz  Macromastia 10-Oct-2024 17:37:38  Ally Ray  
PRE-OP DIAGNOSIS:  Breast cancer, left 10-Oct-2024 16:05:02  Nikky Dietz

## 2024-10-10 NOTE — ASU DISCHARGE PLAN (ADULT/PEDIATRIC) - CARE PROVIDER_API CALL
Shikowitz-Behr, Lauren [facility]  Plastic Surgery  [address], [address]  [place] [address]  Phone: [phone]  Fax: [phone]  Follow Up Time:     Nikky Dietz  Surgery  [address], [place] [address]  Phone: [phone]  Fax: [phone]  Follow Up Time:

## 2024-10-10 NOTE — ASU DISCHARGE PLAN (ADULT/PEDIATRIC) - PROCEDURE
Left breast lumpectomy, left oncoplastic and right symmetrizing breast reductio Left breast lumpectomy, left axillary SLNbx , left oncoplastic and right symmetrizing breast reduction

## 2024-10-10 NOTE — BRIEF OPERATIVE NOTE - SPECIMENS
right breast skin and tissue, additional right breast skin and tissue
SLn 1,2,3; Left breast oncoplastic lumpectomy, posterior margins, superior margin

## 2024-10-10 NOTE — ASU PATIENT PROFILE, ADULT - REASON FOR ADMISSION, PROFILE
magseed localized left breast lumpecatomy latonya  localized left axillary node excision and left axillary left oncoplastic and right symemetrizing breast reduction

## 2024-10-11 VITALS
TEMPERATURE: 98 F | SYSTOLIC BLOOD PRESSURE: 111 MMHG | OXYGEN SATURATION: 97 % | DIASTOLIC BLOOD PRESSURE: 67 MMHG | HEART RATE: 86 BPM | RESPIRATION RATE: 18 BRPM

## 2024-10-11 LAB
GLUCOSE BLDC GLUCOMTR-MCNC: 133 MG/DL — HIGH (ref 70–99)
GLUCOSE BLDC GLUCOMTR-MCNC: 168 MG/DL — HIGH (ref 70–99)

## 2024-10-11 PROCEDURE — 93010 ELECTROCARDIOGRAM REPORT: CPT

## 2024-10-11 RX ORDER — METOPROLOL TARTRATE 50 MG
25 TABLET ORAL
Refills: 0 | Status: DISCONTINUED | OUTPATIENT
Start: 2024-10-11 | End: 2024-10-11

## 2024-10-11 RX ORDER — SODIUM CHLORIDE 0.9 % (FLUSH) 0.9 %
500 SYRINGE (ML) INJECTION ONCE
Refills: 0 | Status: COMPLETED | OUTPATIENT
Start: 2024-10-11 | End: 2024-10-11

## 2024-10-11 RX ADMIN — Medication 25 MILLIGRAM(S): at 12:32

## 2024-10-11 RX ADMIN — Medication 650 MILLIGRAM(S): at 05:00

## 2024-10-11 RX ADMIN — Medication 1000 MILLILITER(S): at 11:27

## 2024-10-11 RX ADMIN — Medication 650 MILLIGRAM(S): at 06:43

## 2024-10-11 NOTE — CHART NOTE - NSCHARTNOTEFT_GEN_A_CORE
Home Health:  Discharged with Home Health Care Services?      Yes  Face-To-Face Contact    As certified below, I, Physician assistant  had a face-to-face encounter that meets the physician face-to-face encounter requirements.  Need for Skilled Services, Rehabilitation services,  Teaching and training  Based on the above findings, the following intermittent skilled services are medically necessary home health services:  Nursing   Home Bound Status: Fall risk

## 2024-10-11 NOTE — PROGRESS NOTE ADULT - SUBJECTIVE AND OBJECTIVE BOX
S:  Comfortable night.    O:  T m 100.4, VSS       Breasts soft.  NAC viable.  Axillary incision soft.       Drains serosang

## 2024-10-11 NOTE — PROGRESS NOTE ADULT - ASSESSMENT
S/p Left breast lumpectomy/axillary node biopsy/reduction  Slight temp- encourage cough, deep breathing, ambulation  Discharge home  Follow-up in office
room air

## 2024-10-14 ENCOUNTER — NON-APPOINTMENT (OUTPATIENT)
Age: 69
End: 2024-10-14

## 2024-10-16 ENCOUNTER — APPOINTMENT (OUTPATIENT)
Dept: PLASTIC SURGERY | Facility: CLINIC | Age: 69
End: 2024-10-16
Payer: MEDICARE

## 2024-10-16 DIAGNOSIS — Z98.890 OTHER SPECIFIED POSTPROCEDURAL STATES: ICD-10-CM

## 2024-10-16 PROCEDURE — 99024 POSTOP FOLLOW-UP VISIT: CPT

## 2024-10-18 PROBLEM — Z98.890 S/P BREAST RECONSTRUCTION, BILATERAL: Status: ACTIVE | Noted: 2024-10-18

## 2024-10-18 LAB — SURGICAL PATHOLOGY STUDY: SIGNIFICANT CHANGE UP

## 2024-10-21 PROBLEM — Z09 POSTOP CHECK: Status: ACTIVE | Noted: 2024-10-21

## 2024-10-22 ENCOUNTER — APPOINTMENT (OUTPATIENT)
Dept: BREAST CENTER | Facility: CLINIC | Age: 69
End: 2024-10-22
Payer: COMMERCIAL

## 2024-10-22 VITALS
BODY MASS INDEX: 39.34 KG/M2 | HEART RATE: 84 BPM | WEIGHT: 222 LBS | SYSTOLIC BLOOD PRESSURE: 118 MMHG | HEIGHT: 63 IN | DIASTOLIC BLOOD PRESSURE: 77 MMHG

## 2024-10-22 DIAGNOSIS — Z09 ENCOUNTER FOR FOLLOW-UP EXAMINATION AFTER COMPLETED TREATMENT FOR CONDITIONS OTHER THAN MALIGNANT NEOPLASM: ICD-10-CM

## 2024-10-22 PROCEDURE — 99024 POSTOP FOLLOW-UP VISIT: CPT

## 2024-10-24 ENCOUNTER — NON-APPOINTMENT (OUTPATIENT)
Age: 69
End: 2024-10-24

## 2024-10-25 ENCOUNTER — APPOINTMENT (OUTPATIENT)
Dept: PLASTIC SURGERY | Facility: CLINIC | Age: 69
End: 2024-10-25

## 2024-10-25 DIAGNOSIS — C50.912 MALIGNANT NEOPLASM OF UNSPECIFIED SITE OF LEFT FEMALE BREAST: ICD-10-CM

## 2024-10-25 DIAGNOSIS — N64.81 PTOSIS OF BREAST: ICD-10-CM

## 2024-10-25 DIAGNOSIS — C77.3 MALIGNANT NEOPLASM OF UNSPECIFIED SITE OF LEFT FEMALE BREAST: ICD-10-CM

## 2024-10-25 PROCEDURE — 99024 POSTOP FOLLOW-UP VISIT: CPT

## 2024-11-01 ENCOUNTER — APPOINTMENT (OUTPATIENT)
Dept: PLASTIC SURGERY | Facility: CLINIC | Age: 69
End: 2024-11-01
Payer: COMMERCIAL

## 2024-11-01 DIAGNOSIS — Z98.890 OTHER SPECIFIED POSTPROCEDURAL STATES: ICD-10-CM

## 2024-11-01 PROCEDURE — 99024 POSTOP FOLLOW-UP VISIT: CPT

## 2024-11-01 NOTE — ASU PATIENT PROFILE, ADULT - NSICDXPASTSURGICALHX_GEN_ALL_CORE_FT
PAST SURGICAL HISTORY:  Dental disorder teeth removal 2010    History of D&C 12/6/17 and 2018 for thickened endometrium    S/P ablation of atrial fibrillation     S/P AVR (aortic valve replacement)     S/P cholecystectomy 1988    S/P foot surgery bunionectomy right foot 2013  left foot 2013

## 2024-11-01 NOTE — ASU PATIENT PROFILE, ADULT - REASON FOR ADMISSION, PROFILE
magseed localized left breast lumpecatomy latonya  localized left axillary node excision and left axillary left oncoplastic and right symemetrizing breast reduction left  axillary dissection

## 2024-11-04 ENCOUNTER — TRANSCRIPTION ENCOUNTER (OUTPATIENT)
Age: 69
End: 2024-11-04

## 2024-11-04 ENCOUNTER — OUTPATIENT (OUTPATIENT)
Dept: INPATIENT UNIT | Facility: HOSPITAL | Age: 69
LOS: 1 days | Discharge: HOME | End: 2024-11-04
Payer: MEDICARE

## 2024-11-04 ENCOUNTER — RESULT REVIEW (OUTPATIENT)
Age: 69
End: 2024-11-04

## 2024-11-04 ENCOUNTER — APPOINTMENT (OUTPATIENT)
Dept: BREAST CENTER | Facility: HOSPITAL | Age: 69
End: 2024-11-04

## 2024-11-04 VITALS
OXYGEN SATURATION: 98 % | TEMPERATURE: 97 F | WEIGHT: 227.96 LBS | HEART RATE: 84 BPM | DIASTOLIC BLOOD PRESSURE: 81 MMHG | SYSTOLIC BLOOD PRESSURE: 131 MMHG | RESPIRATION RATE: 16 BRPM | HEIGHT: 63 IN

## 2024-11-04 VITALS
HEART RATE: 99 BPM | TEMPERATURE: 98 F | DIASTOLIC BLOOD PRESSURE: 76 MMHG | RESPIRATION RATE: 16 BRPM | OXYGEN SATURATION: 97 % | SYSTOLIC BLOOD PRESSURE: 124 MMHG

## 2024-11-04 DIAGNOSIS — Z95.2 PRESENCE OF PROSTHETIC HEART VALVE: Chronic | ICD-10-CM

## 2024-11-04 DIAGNOSIS — Z98.890 OTHER SPECIFIED POSTPROCEDURAL STATES: Chronic | ICD-10-CM

## 2024-11-04 DIAGNOSIS — C50.912 MALIGNANT NEOPLASM OF UNSPECIFIED SITE OF LEFT FEMALE BREAST: ICD-10-CM

## 2024-11-04 PROCEDURE — 38745 REMOVE ARMPIT LYMPH NODES: CPT | Mod: AS,LT

## 2024-11-04 PROCEDURE — 38999 UNLISTD PX HEMIC/LYMPHTC SYS: CPT | Mod: 58

## 2024-11-04 PROCEDURE — 88305 TISSUE EXAM BY PATHOLOGIST: CPT | Mod: 26

## 2024-11-04 PROCEDURE — 88342 IMHCHEM/IMCYTCHM 1ST ANTB: CPT | Mod: 26

## 2024-11-04 PROCEDURE — 88307 TISSUE EXAM BY PATHOLOGIST: CPT

## 2024-11-04 PROCEDURE — 38745 REMOVE ARMPIT LYMPH NODES: CPT | Mod: 58,LT

## 2024-11-04 PROCEDURE — 88307 TISSUE EXAM BY PATHOLOGIST: CPT | Mod: 26

## 2024-11-04 PROCEDURE — 88342 IMHCHEM/IMCYTCHM 1ST ANTB: CPT

## 2024-11-04 PROCEDURE — 88305 TISSUE EXAM BY PATHOLOGIST: CPT

## 2024-11-04 PROCEDURE — 88341 IMHCHEM/IMCYTCHM EA ADD ANTB: CPT

## 2024-11-04 PROCEDURE — 88341 IMHCHEM/IMCYTCHM EA ADD ANTB: CPT | Mod: 26

## 2024-11-04 PROCEDURE — C1889: CPT

## 2024-11-04 RX ORDER — FENTANYL CITRAT/DEXTROSE 5%/PF 1250MCG/50
50 PATIENT CONTROLLED ANALGESIA SYRINGE INTRAVENOUS
Refills: 0 | Status: DISCONTINUED | OUTPATIENT
Start: 2024-11-04 | End: 2024-11-04

## 2024-11-04 RX ORDER — OXYCODONE HYDROCHLORIDE 30 MG/1
5 TABLET ORAL EVERY 6 HOURS
Refills: 0 | Status: DISCONTINUED | OUTPATIENT
Start: 2024-11-04 | End: 2024-11-04

## 2024-11-04 RX ORDER — OXYCODONE HYDROCHLORIDE 30 MG/1
5 TABLET ORAL ONCE
Refills: 0 | Status: DISCONTINUED | OUTPATIENT
Start: 2024-11-04 | End: 2024-11-04

## 2024-11-04 RX ORDER — ONDANSETRON HYDROCHLORIDE 2 MG/ML
4 INJECTION, SOLUTION INTRAMUSCULAR; INTRAVENOUS ONCE
Refills: 0 | Status: DISCONTINUED | OUTPATIENT
Start: 2024-11-04 | End: 2024-11-04

## 2024-11-04 NOTE — ASU DISCHARGE PLAN (ADULT/PEDIATRIC) - CARE PROVIDER_API CALL
Nikky Dietz  Surgery  [address], [place] [address]  Phone: [phone]  Fax: [phone]  Follow Up Time:

## 2024-11-04 NOTE — BRIEF OPERATIVE NOTE - NSICDXBRIEFPROCEDURE_GEN_ALL_CORE_FT
PROCEDURES:  Left axillary lymph node dissection 04-Nov-2024 10:40:08  Mt Chakraborty   PROCEDURES:  Axillary reverse lymphatic mapping 04-Nov-2024 12:53:41  Nikky Dietz

## 2024-11-04 NOTE — BRIEF OPERATIVE NOTE - NSICDXBRIEFPREOP_GEN_ALL_CORE_FT
PRE-OP DIAGNOSIS:  Breast cancer metastasized to axillary lymph node, left 04-Nov-2024 10:41:10  Mt Chakraborty

## 2024-11-04 NOTE — BRIEF OPERATIVE NOTE - NSICDXBRIEFPOSTOP_GEN_ALL_CORE_FT
POST-OP DIAGNOSIS:  Breast cancer metastasized to axillary lymph node, left 04-Nov-2024 10:41:18  Mt Chakraborty

## 2024-11-04 NOTE — ASU DISCHARGE PLAN (ADULT/PEDIATRIC) - FINANCIAL ASSISTANCE
[facility] provides services at a reduced cost to those who are determined to be eligible through [facility]’s financial assistance program. Information regarding [facility]’s financial assistance program can be found by going to [email or web address] or by calling [phone].

## 2024-11-05 ENCOUNTER — NON-APPOINTMENT (OUTPATIENT)
Age: 69
End: 2024-11-05

## 2024-11-06 NOTE — H&P PST ADULT - COMFORT LEVEL, ACCEPTABLE
----- Message from Renee sent at 11/6/2024 10:42 AM CST -----  Contact: Self [phone]  Would like to receive medical advice.    Would they like a call back or a response via [facility]:  call back    Additional information:  Calling to request lab orders added for upcoming appt.   8

## 2024-11-07 LAB — SURGICAL PATHOLOGY STUDY: SIGNIFICANT CHANGE UP

## 2024-11-08 ENCOUNTER — APPOINTMENT (OUTPATIENT)
Dept: PLASTIC SURGERY | Facility: CLINIC | Age: 69
End: 2024-11-08

## 2024-11-08 ENCOUNTER — APPOINTMENT (OUTPATIENT)
Dept: BREAST CENTER | Facility: CLINIC | Age: 69
End: 2024-11-08
Payer: COMMERCIAL

## 2024-11-08 VITALS
SYSTOLIC BLOOD PRESSURE: 126 MMHG | WEIGHT: 222 LBS | BODY MASS INDEX: 39.34 KG/M2 | HEART RATE: 81 BPM | HEIGHT: 63 IN | DIASTOLIC BLOOD PRESSURE: 83 MMHG

## 2024-11-08 DIAGNOSIS — Z79.82 LONG TERM (CURRENT) USE OF ASPIRIN: ICD-10-CM

## 2024-11-08 DIAGNOSIS — I48.0 PAROXYSMAL ATRIAL FIBRILLATION: ICD-10-CM

## 2024-11-08 DIAGNOSIS — Z98.890 OTHER SPECIFIED POSTPROCEDURAL STATES: ICD-10-CM

## 2024-11-08 DIAGNOSIS — E11.9 TYPE 2 DIABETES MELLITUS WITHOUT COMPLICATIONS: ICD-10-CM

## 2024-11-08 DIAGNOSIS — E78.5 HYPERLIPIDEMIA, UNSPECIFIED: ICD-10-CM

## 2024-11-08 DIAGNOSIS — N64.81 PTOSIS OF BREAST: ICD-10-CM

## 2024-11-08 DIAGNOSIS — Z87.891 PERSONAL HISTORY OF NICOTINE DEPENDENCE: ICD-10-CM

## 2024-11-08 DIAGNOSIS — C50.912 MALIGNANT NEOPLASM OF UNSPECIFIED SITE OF LEFT FEMALE BREAST: ICD-10-CM

## 2024-11-08 DIAGNOSIS — C77.3 SECONDARY AND UNSPECIFIED MALIGNANT NEOPLASM OF AXILLA AND UPPER LIMB LYMPH NODES: ICD-10-CM

## 2024-11-08 DIAGNOSIS — Z79.85 LONG-TERM (CURRENT) USE OF INJECTABLE NON-INSULIN ANTIDIABETIC DRUGS: ICD-10-CM

## 2024-11-08 DIAGNOSIS — J44.9 CHRONIC OBSTRUCTIVE PULMONARY DISEASE, UNSPECIFIED: ICD-10-CM

## 2024-11-08 PROCEDURE — 99024 POSTOP FOLLOW-UP VISIT: CPT

## 2024-11-10 ENCOUNTER — OUTPATIENT (OUTPATIENT)
Dept: OUTPATIENT SERVICES | Facility: HOSPITAL | Age: 69
LOS: 1 days | Discharge: HOME | End: 2024-11-10

## 2024-11-10 DIAGNOSIS — Z98.890 OTHER SPECIFIED POSTPROCEDURAL STATES: Chronic | ICD-10-CM

## 2024-11-10 DIAGNOSIS — Z95.2 PRESENCE OF PROSTHETIC HEART VALVE: Chronic | ICD-10-CM

## 2024-11-10 DIAGNOSIS — R92.8 OTHER ABNORMAL AND INCONCLUSIVE FINDINGS ON DIAGNOSTIC IMAGING OF BREAST: ICD-10-CM

## 2024-11-12 ENCOUNTER — APPOINTMENT (OUTPATIENT)
Dept: HEMATOLOGY ONCOLOGY | Facility: CLINIC | Age: 69
End: 2024-11-12
Payer: COMMERCIAL

## 2024-11-12 ENCOUNTER — APPOINTMENT (OUTPATIENT)
Dept: BREAST CENTER | Facility: CLINIC | Age: 69
End: 2024-11-12
Payer: COMMERCIAL

## 2024-11-12 VITALS
HEART RATE: 85 BPM | WEIGHT: 230 LBS | SYSTOLIC BLOOD PRESSURE: 117 MMHG | HEIGHT: 63 IN | OXYGEN SATURATION: 95 % | DIASTOLIC BLOOD PRESSURE: 71 MMHG | BODY MASS INDEX: 40.75 KG/M2 | TEMPERATURE: 98 F

## 2024-11-12 VITALS
HEIGHT: 63 IN | DIASTOLIC BLOOD PRESSURE: 74 MMHG | BODY MASS INDEX: 40.75 KG/M2 | HEART RATE: 82 BPM | WEIGHT: 230 LBS | SYSTOLIC BLOOD PRESSURE: 118 MMHG

## 2024-11-12 DIAGNOSIS — Z09 ENCOUNTER FOR FOLLOW-UP EXAMINATION AFTER COMPLETED TREATMENT FOR CONDITIONS OTHER THAN MALIGNANT NEOPLASM: ICD-10-CM

## 2024-11-12 DIAGNOSIS — C77.3 MALIGNANT NEOPLASM OF UNSPECIFIED SITE OF LEFT FEMALE BREAST: ICD-10-CM

## 2024-11-12 DIAGNOSIS — C50.912 MALIGNANT NEOPLASM OF UNSPECIFIED SITE OF LEFT FEMALE BREAST: ICD-10-CM

## 2024-11-12 PROCEDURE — 99024 POSTOP FOLLOW-UP VISIT: CPT

## 2024-11-12 PROCEDURE — 99205 OFFICE O/P NEW HI 60 MIN: CPT

## 2024-11-13 DIAGNOSIS — C50.912 MALIGNANT NEOPLASM OF UNSPECIFIED SITE OF LEFT FEMALE BREAST: ICD-10-CM

## 2024-11-19 ENCOUNTER — NON-APPOINTMENT (OUTPATIENT)
Age: 69
End: 2024-11-19

## 2024-11-20 ENCOUNTER — NON-APPOINTMENT (OUTPATIENT)
Age: 69
End: 2024-11-20

## 2024-11-26 ENCOUNTER — APPOINTMENT (OUTPATIENT)
Dept: HEMATOLOGY ONCOLOGY | Facility: CLINIC | Age: 69
End: 2024-11-26

## 2024-11-26 ENCOUNTER — NON-APPOINTMENT (OUTPATIENT)
Age: 69
End: 2024-11-26

## 2024-12-11 ENCOUNTER — APPOINTMENT (OUTPATIENT)
Dept: NUCLEAR MEDICINE | Facility: CLINIC | Age: 69
End: 2024-12-11

## 2024-12-11 PROCEDURE — 78816 PET IMAGE W/CT FULL BODY: CPT | Mod: PI

## 2024-12-11 PROCEDURE — A9591: CPT

## 2024-12-16 ENCOUNTER — OUTPATIENT (OUTPATIENT)
Dept: OUTPATIENT SERVICES | Facility: HOSPITAL | Age: 69
LOS: 1 days | Discharge: HOME | End: 2024-12-16

## 2024-12-16 DIAGNOSIS — Z98.890 OTHER SPECIFIED POSTPROCEDURAL STATES: Chronic | ICD-10-CM

## 2024-12-16 DIAGNOSIS — C50.912 MALIGNANT NEOPLASM OF UNSPECIFIED SITE OF LEFT FEMALE BREAST: ICD-10-CM

## 2024-12-17 ENCOUNTER — APPOINTMENT (OUTPATIENT)
Dept: HEMATOLOGY ONCOLOGY | Facility: CLINIC | Age: 69
End: 2024-12-17
Payer: MEDICARE

## 2024-12-17 VITALS
BODY MASS INDEX: 40.4 KG/M2 | WEIGHT: 228 LBS | SYSTOLIC BLOOD PRESSURE: 131 MMHG | HEIGHT: 63 IN | DIASTOLIC BLOOD PRESSURE: 76 MMHG | HEART RATE: 87 BPM | OXYGEN SATURATION: 96 % | TEMPERATURE: 98.2 F

## 2024-12-17 DIAGNOSIS — N64.81 PTOSIS OF BREAST: ICD-10-CM

## 2024-12-17 DIAGNOSIS — I71.9 AORTIC ANEURYSM OF UNSPECIFIED SITE, W/OUT RUPTURE: ICD-10-CM

## 2024-12-17 DIAGNOSIS — Z13.79 ENCOUNTER FOR OTHER SCREENING FOR GENETIC AND CHROMOSOMAL ANOMALIES: ICD-10-CM

## 2024-12-17 DIAGNOSIS — C50.912 MALIGNANT NEOPLASM OF UNSPECIFIED SITE OF LEFT FEMALE BREAST: ICD-10-CM

## 2024-12-17 DIAGNOSIS — C77.3 MALIGNANT NEOPLASM OF UNSPECIFIED SITE OF LEFT FEMALE BREAST: ICD-10-CM

## 2024-12-17 DIAGNOSIS — R92.8 OTHER ABNORMAL AND INCONCLUSIVE FINDINGS ON DIAGNOSTIC IMAGING OF BREAST: ICD-10-CM

## 2024-12-17 DIAGNOSIS — I48.91 UNSPECIFIED ATRIAL FIBRILLATION: ICD-10-CM

## 2024-12-17 DIAGNOSIS — E11.9 TYPE 2 DIABETES MELLITUS W/OUT COMPLICATIONS: ICD-10-CM

## 2024-12-17 DIAGNOSIS — Z09 ENCOUNTER FOR FOLLOW-UP EXAMINATION AFTER COMPLETED TREATMENT FOR CONDITIONS OTHER THAN MALIGNANT NEOPLASM: ICD-10-CM

## 2024-12-17 DIAGNOSIS — Z86.79 OTHER SPECIFIED POSTPROCEDURAL STATES: ICD-10-CM

## 2024-12-17 DIAGNOSIS — E78.5 HYPERLIPIDEMIA, UNSPECIFIED: ICD-10-CM

## 2024-12-17 DIAGNOSIS — J44.9 CHRONIC OBSTRUCTIVE PULMONARY DISEASE, UNSPECIFIED: ICD-10-CM

## 2024-12-17 DIAGNOSIS — R93.3 ABNORMAL FINDINGS ON DIAGNOSTIC IMAGING OF OTHER PARTS OF DIGESTIVE TRACT: ICD-10-CM

## 2024-12-17 DIAGNOSIS — Z98.890 OTHER SPECIFIED POSTPROCEDURAL STATES: ICD-10-CM

## 2024-12-17 PROCEDURE — 99205 OFFICE O/P NEW HI 60 MIN: CPT

## 2024-12-17 PROCEDURE — 99215 OFFICE O/P EST HI 40 MIN: CPT

## 2024-12-18 ENCOUNTER — NON-APPOINTMENT (OUTPATIENT)
Age: 69
End: 2024-12-18

## 2024-12-23 ENCOUNTER — NON-APPOINTMENT (OUTPATIENT)
Age: 69
End: 2024-12-23

## 2024-12-26 DIAGNOSIS — Z79.69 LONG TERM (CURRENT) USE OF OTHER IMMUNOMODULATORS AND IMMUNOSUPPRESSANTS: ICD-10-CM

## 2024-12-26 DIAGNOSIS — K21.9 GASTRO-ESOPHAGEAL REFLUX DISEASE W/OUT ESOPHAGITIS: ICD-10-CM

## 2024-12-26 RX ORDER — OMEPRAZOLE 40 MG/1
40 CAPSULE, DELAYED RELEASE ORAL
Qty: 90 | Refills: 3 | Status: ACTIVE | COMMUNITY
Start: 2024-12-26 | End: 1900-01-01

## 2024-12-26 RX ORDER — PROCHLORPERAZINE MALEATE 10 MG/1
10 TABLET ORAL EVERY 6 HOURS
Qty: 30 | Refills: 3 | Status: ACTIVE | COMMUNITY
Start: 2024-12-26 | End: 1900-01-01

## 2024-12-27 NOTE — ASU PATIENT PROFILE, ADULT - FALL HARM RISK - DATE OF FALL
[facility]  OB/GYN  [address] [place] [address]  Phone: [phone]  Fax:   Follow Up Time: 19-Dec-2024

## 2024-12-27 NOTE — ASU PATIENT PROFILE, ADULT - NSICDXPASTSURGICALHX_GEN_ALL_CORE_FT
PAST SURGICAL HISTORY:  Dental disorder teeth removal 2010    History of D&C 12/6/17 and 2018 for thickened endometrium    S/P ablation of atrial fibrillation     S/P AVR (aortic valve replacement)     S/P cholecystectomy 1988    S/P foot surgery bunionectomy right foot 2013  left foot 2013     PAST SURGICAL HISTORY:  Dental disorder teeth removal 2010    History of D&C 12/6/17 and 2018 for thickened endometrium    History of surgery breast x2 25 lymph nodes removed    S/P ablation of atrial fibrillation     S/P AVR (aortic valve replacement)     S/P cholecystectomy 1988    S/P foot surgery bunionectomy right foot 2013  left foot 2013

## 2024-12-27 NOTE — ASU PATIENT PROFILE, ADULT - CAREGIVER
[Abdominal Pain] : abdominal pain [Vomiting] : no vomiting [Constipation] : constipation [Diarrhea] : diarrhea [Heartburn] : no heartburn [Melena] : no melena [Negative] : Heme/Lymph Declines

## 2024-12-27 NOTE — ASU PATIENT PROFILE, ADULT - FALL HARM RISK - HARM RISK INTERVENTIONS

## 2024-12-30 ENCOUNTER — NON-APPOINTMENT (OUTPATIENT)
Age: 69
End: 2024-12-30

## 2024-12-30 ENCOUNTER — OUTPATIENT (OUTPATIENT)
Dept: OUTPATIENT SERVICES | Facility: HOSPITAL | Age: 69
LOS: 1 days | End: 2024-12-30
Payer: MEDICARE

## 2024-12-30 ENCOUNTER — OUTPATIENT (OUTPATIENT)
Dept: INPATIENT UNIT | Facility: HOSPITAL | Age: 69
LOS: 1 days | Discharge: HOME | End: 2024-12-30
Payer: MEDICARE

## 2024-12-30 VITALS
HEART RATE: 96 BPM | WEIGHT: 227.96 LBS | RESPIRATION RATE: 16 BRPM | TEMPERATURE: 98 F | SYSTOLIC BLOOD PRESSURE: 142 MMHG | HEIGHT: 63 IN | DIASTOLIC BLOOD PRESSURE: 77 MMHG | OXYGEN SATURATION: 97 %

## 2024-12-30 VITALS
TEMPERATURE: 98 F | RESPIRATION RATE: 17 BRPM | SYSTOLIC BLOOD PRESSURE: 119 MMHG | HEART RATE: 90 BPM | DIASTOLIC BLOOD PRESSURE: 77 MMHG | OXYGEN SATURATION: 98 %

## 2024-12-30 DIAGNOSIS — Z98.890 OTHER SPECIFIED POSTPROCEDURAL STATES: Chronic | ICD-10-CM

## 2024-12-30 DIAGNOSIS — Z95.2 PRESENCE OF PROSTHETIC HEART VALVE: Chronic | ICD-10-CM

## 2024-12-30 DIAGNOSIS — C50.912 MALIGNANT NEOPLASM OF UNSPECIFIED SITE OF LEFT FEMALE BREAST: ICD-10-CM

## 2024-12-30 LAB
ANION GAP SERPL CALC-SCNC: 4 MMOL/L — LOW (ref 5–17)
BUN SERPL-MCNC: 15 MG/DL — SIGNIFICANT CHANGE UP (ref 7–23)
CALCIUM SERPL-MCNC: 9.2 MG/DL — SIGNIFICANT CHANGE UP (ref 8.5–10.1)
CHLORIDE SERPL-SCNC: 104 MMOL/L — SIGNIFICANT CHANGE UP (ref 96–108)
CO2 SERPL-SCNC: 30 MMOL/L — SIGNIFICANT CHANGE UP (ref 22–31)
CREAT SERPL-MCNC: 0.66 MG/DL — SIGNIFICANT CHANGE UP (ref 0.5–1.3)
EGFR: 94 ML/MIN/1.73M2 — SIGNIFICANT CHANGE UP
GLUCOSE SERPL-MCNC: 128 MG/DL — HIGH (ref 70–99)
HCT VFR BLD CALC: 41.2 % — SIGNIFICANT CHANGE UP (ref 34.5–45)
HGB BLD-MCNC: 13.2 G/DL — SIGNIFICANT CHANGE UP (ref 11.5–15.5)
INR BLD: 1.04 RATIO — SIGNIFICANT CHANGE UP (ref 0.85–1.16)
MCHC RBC-ENTMCNC: 28.9 PG — SIGNIFICANT CHANGE UP (ref 27–34)
MCHC RBC-ENTMCNC: 32 G/DL — SIGNIFICANT CHANGE UP (ref 32–36)
MCV RBC AUTO: 90.2 FL — SIGNIFICANT CHANGE UP (ref 80–100)
PLATELET # BLD AUTO: 288 K/UL — SIGNIFICANT CHANGE UP (ref 150–400)
POTASSIUM SERPL-MCNC: 4.1 MMOL/L — SIGNIFICANT CHANGE UP (ref 3.5–5.3)
POTASSIUM SERPL-SCNC: 4.1 MMOL/L — SIGNIFICANT CHANGE UP (ref 3.5–5.3)
PROTHROM AB SERPL-ACNC: 12.3 SEC — SIGNIFICANT CHANGE UP (ref 9.9–13.4)
RBC # BLD: 4.57 M/UL — SIGNIFICANT CHANGE UP (ref 3.8–5.2)
RBC # FLD: 14.7 % — HIGH (ref 10.3–14.5)
SODIUM SERPL-SCNC: 138 MMOL/L — SIGNIFICANT CHANGE UP (ref 135–145)
WBC # BLD: 12.55 K/UL — HIGH (ref 3.8–10.5)
WBC # FLD AUTO: 12.55 K/UL — HIGH (ref 3.8–10.5)

## 2024-12-30 PROCEDURE — 87077 CULTURE AEROBIC IDENTIFY: CPT

## 2024-12-30 PROCEDURE — 85027 COMPLETE CBC AUTOMATED: CPT

## 2024-12-30 PROCEDURE — 87086 URINE CULTURE/COLONY COUNT: CPT

## 2024-12-30 PROCEDURE — 87040 BLOOD CULTURE FOR BACTERIA: CPT | Mod: 91

## 2024-12-30 PROCEDURE — 80048 BASIC METABOLIC PNL TOTAL CA: CPT

## 2024-12-30 PROCEDURE — 85610 PROTHROMBIN TIME: CPT

## 2024-12-30 PROCEDURE — 36415 COLL VENOUS BLD VENIPUNCTURE: CPT

## 2024-12-30 PROCEDURE — 87040 BLOOD CULTURE FOR BACTERIA: CPT

## 2024-12-30 NOTE — CHART NOTE - NSCHARTNOTEFT_GEN_A_CORE
Pt seen in anticipation of port placement. Found to have new leukocytosis to 12.55 (baseline 7-9). Denies fevers, chills, cough or obvious UTI. As she is planned for an implantable device, infectious etiology should be ruled out prior to placement. Blood and urine cultures ordered. D/W Dr. Engle. will reschedule when once infection has been ruled out or treated.

## 2025-01-01 LAB
CULTURE RESULTS: ABNORMAL
SPECIMEN SOURCE: SIGNIFICANT CHANGE UP

## 2025-01-02 ENCOUNTER — APPOINTMENT (OUTPATIENT)
Dept: INFUSION THERAPY | Facility: CLINIC | Age: 70
End: 2025-01-02

## 2025-01-02 ENCOUNTER — NON-APPOINTMENT (OUTPATIENT)
Age: 70
End: 2025-01-02

## 2025-01-02 DIAGNOSIS — N39.0 URINARY TRACT INFECTION, SITE NOT SPECIFIED: ICD-10-CM

## 2025-01-04 LAB
CULTURE RESULTS: SIGNIFICANT CHANGE UP
CULTURE RESULTS: SIGNIFICANT CHANGE UP
SPECIMEN SOURCE: SIGNIFICANT CHANGE UP
SPECIMEN SOURCE: SIGNIFICANT CHANGE UP

## 2025-01-08 ENCOUNTER — TRANSCRIPTION ENCOUNTER (OUTPATIENT)
Age: 70
End: 2025-01-08

## 2025-01-08 ENCOUNTER — OUTPATIENT (OUTPATIENT)
Dept: INPATIENT UNIT | Facility: HOSPITAL | Age: 70
LOS: 1 days | Discharge: HOME | End: 2025-01-08
Payer: MEDICARE

## 2025-01-08 ENCOUNTER — RESULT REVIEW (OUTPATIENT)
Age: 70
End: 2025-01-08

## 2025-01-08 VITALS
DIASTOLIC BLOOD PRESSURE: 67 MMHG | HEART RATE: 79 BPM | HEIGHT: 63 IN | WEIGHT: 227.96 LBS | TEMPERATURE: 98 F | SYSTOLIC BLOOD PRESSURE: 131 MMHG | OXYGEN SATURATION: 96 % | RESPIRATION RATE: 16 BRPM

## 2025-01-08 VITALS
OXYGEN SATURATION: 97 % | HEART RATE: 71 BPM | RESPIRATION RATE: 18 BRPM | TEMPERATURE: 98 F | DIASTOLIC BLOOD PRESSURE: 79 MMHG | SYSTOLIC BLOOD PRESSURE: 152 MMHG

## 2025-01-08 DIAGNOSIS — Z98.890 OTHER SPECIFIED POSTPROCEDURAL STATES: Chronic | ICD-10-CM

## 2025-01-08 DIAGNOSIS — C50.912 MALIGNANT NEOPLASM OF UNSPECIFIED SITE OF LEFT FEMALE BREAST: ICD-10-CM

## 2025-01-08 DIAGNOSIS — Z95.2 PRESENCE OF PROSTHETIC HEART VALVE: Chronic | ICD-10-CM

## 2025-01-08 LAB
HCT VFR BLD CALC: 41.9 % — SIGNIFICANT CHANGE UP (ref 34.5–45)
HGB BLD-MCNC: 13.8 G/DL — SIGNIFICANT CHANGE UP (ref 11.5–15.5)
MCHC RBC-ENTMCNC: 29.2 PG — SIGNIFICANT CHANGE UP (ref 27–34)
MCHC RBC-ENTMCNC: 32.9 G/DL — SIGNIFICANT CHANGE UP (ref 32–36)
MCV RBC AUTO: 88.8 FL — SIGNIFICANT CHANGE UP (ref 80–100)
PLATELET # BLD AUTO: 272 K/UL — SIGNIFICANT CHANGE UP (ref 150–400)
RBC # BLD: 4.72 M/UL — SIGNIFICANT CHANGE UP (ref 3.8–5.2)
RBC # FLD: 14.3 % — SIGNIFICANT CHANGE UP (ref 10.3–14.5)
WBC # BLD: 9.78 K/UL — SIGNIFICANT CHANGE UP (ref 3.8–10.5)
WBC # FLD AUTO: 9.78 K/UL — SIGNIFICANT CHANGE UP (ref 3.8–10.5)

## 2025-01-08 PROCEDURE — 36561 INSERT TUNNELED CV CATH: CPT | Mod: RT

## 2025-01-08 PROCEDURE — 85027 COMPLETE CBC AUTOMATED: CPT

## 2025-01-08 PROCEDURE — 77001 FLUOROGUIDE FOR VEIN DEVICE: CPT

## 2025-01-08 PROCEDURE — 36415 COLL VENOUS BLD VENIPUNCTURE: CPT

## 2025-01-08 PROCEDURE — C1769: CPT

## 2025-01-08 PROCEDURE — 36561 INSERT TUNNELED CV CATH: CPT

## 2025-01-08 PROCEDURE — 77001 FLUOROGUIDE FOR VEIN DEVICE: CPT | Mod: 26

## 2025-01-08 PROCEDURE — C1894: CPT

## 2025-01-08 PROCEDURE — 76937 US GUIDE VASCULAR ACCESS: CPT

## 2025-01-08 PROCEDURE — 76937 US GUIDE VASCULAR ACCESS: CPT | Mod: 26

## 2025-01-08 RX ORDER — SODIUM CHLORIDE 9 MG/ML
1000 INJECTION, SOLUTION INTRAMUSCULAR; INTRAVENOUS; SUBCUTANEOUS
Refills: 0 | Status: DISCONTINUED | OUTPATIENT
Start: 2025-01-08 | End: 2025-01-08

## 2025-01-08 RX ORDER — ACETAMINOPHEN 80 MG/.8ML
1000 SOLUTION/ DROPS ORAL ONCE
Refills: 0 | Status: DISCONTINUED | OUTPATIENT
Start: 2025-01-08 | End: 2025-01-08

## 2025-01-08 RX ORDER — ONDANSETRON 4 MG/1
4 TABLET ORAL EVERY 6 HOURS
Refills: 0 | Status: DISCONTINUED | OUTPATIENT
Start: 2025-01-08 | End: 2025-01-08

## 2025-01-08 RX ORDER — FENTANYL 75 UG/H
25 PATCH, EXTENDED RELEASE TRANSDERMAL
Refills: 0 | Status: DISCONTINUED | OUTPATIENT
Start: 2025-01-08 | End: 2025-01-08

## 2025-01-08 NOTE — ASU DISCHARGE PLAN (ADULT/PEDIATRIC) - FINANCIAL ASSISTANCE
[facility] provides services at a reduced cost to those who are determined to be eligible through [facility]’s financial assistance program. Information regarding [facility]’s financial assistance program can be found by going to [email or web address] or by calling [phone].

## 2025-01-08 NOTE — ASU PATIENT PROFILE, ADULT - NSICDXPASTSURGICALHX_GEN_ALL_CORE_FT
PAST SURGICAL HISTORY:  Dental disorder teeth removal 2010    History of D&C 12/6/17 and 2018 for thickened endometrium    History of surgery breast x2 25 lymph nodes remove Left    S/P ablation of atrial fibrillation     S/P AVR (aortic valve replacement)     S/P cholecystectomy 1988    S/P foot surgery bunionectomy right foot 2013  left foot 2013

## 2025-01-08 NOTE — ASU PATIENT PROFILE, ADULT - HEALTH/HEALTHCARE ANXIETIES, PROFILE
Brief Medicine Note    LFTs improved. HCV olive negative. HBV serologies consistent with immunity.     He will need a repeat hepatic panel as an outpatient, follow-up order placed in discharge navigator.     Medicine will sign off. Please do not hesitate to contact if new questions or concerns arise.     Mary Nair PA-C  Hospitalist Service  Pager: [phone]         denies

## 2025-01-09 ENCOUNTER — APPOINTMENT (OUTPATIENT)
Dept: HEMATOLOGY ONCOLOGY | Facility: CLINIC | Age: 70
End: 2025-01-09

## 2025-01-09 ENCOUNTER — RESULT REVIEW (OUTPATIENT)
Age: 70
End: 2025-01-09

## 2025-01-09 ENCOUNTER — APPOINTMENT (OUTPATIENT)
Dept: INFUSION THERAPY | Facility: CLINIC | Age: 70
End: 2025-01-09

## 2025-01-09 VITALS
HEIGHT: 63 IN | OXYGEN SATURATION: 96 % | BODY MASS INDEX: 40.4 KG/M2 | WEIGHT: 228 LBS | DIASTOLIC BLOOD PRESSURE: 71 MMHG | TEMPERATURE: 97.9 F | HEART RATE: 82 BPM | SYSTOLIC BLOOD PRESSURE: 104 MMHG

## 2025-01-09 DIAGNOSIS — C50.912 MALIGNANT NEOPLASM OF UNSPECIFIED SITE OF LEFT FEMALE BREAST: ICD-10-CM

## 2025-01-09 LAB
BASOPHILS # BLD AUTO: 0.03 K/UL — SIGNIFICANT CHANGE UP (ref 0–0.2)
BASOPHILS NFR BLD AUTO: 0.4 % — SIGNIFICANT CHANGE UP (ref 0–2)
EOSINOPHIL # BLD AUTO: 0.1 K/UL — SIGNIFICANT CHANGE UP (ref 0–0.5)
EOSINOPHIL NFR BLD AUTO: 1.2 % — SIGNIFICANT CHANGE UP (ref 0–6)
HCT VFR BLD CALC: 39.7 % — SIGNIFICANT CHANGE UP (ref 34.5–45)
HGB BLD-MCNC: 13 G/DL — SIGNIFICANT CHANGE UP (ref 11.5–15.5)
IMM GRANULOCYTES NFR BLD AUTO: 0.4 % — SIGNIFICANT CHANGE UP (ref 0–0.9)
LYMPHOCYTES # BLD AUTO: 2.07 K/UL — SIGNIFICANT CHANGE UP (ref 1–3.3)
LYMPHOCYTES # BLD AUTO: 25.3 % — SIGNIFICANT CHANGE UP (ref 13–44)
MCHC RBC-ENTMCNC: 28.8 PG — SIGNIFICANT CHANGE UP (ref 27–34)
MCHC RBC-ENTMCNC: 32.7 G/DL — SIGNIFICANT CHANGE UP (ref 32–36)
MCV RBC AUTO: 88 FL — SIGNIFICANT CHANGE UP (ref 80–100)
MONOCYTES # BLD AUTO: 0.53 K/UL — SIGNIFICANT CHANGE UP (ref 0–0.9)
MONOCYTES NFR BLD AUTO: 6.5 % — SIGNIFICANT CHANGE UP (ref 2–14)
NEUTROPHILS # BLD AUTO: 5.42 K/UL — SIGNIFICANT CHANGE UP (ref 1.8–7.4)
NEUTROPHILS NFR BLD AUTO: 66.2 % — SIGNIFICANT CHANGE UP (ref 43–77)
NRBC # BLD: 0 /100 WBCS — SIGNIFICANT CHANGE UP (ref 0–0)
NRBC BLD-RTO: 0 /100 WBCS — SIGNIFICANT CHANGE UP (ref 0–0)
PLATELET # BLD AUTO: 261 K/UL — SIGNIFICANT CHANGE UP (ref 150–400)
RBC # BLD: 4.51 M/UL — SIGNIFICANT CHANGE UP (ref 3.8–5.2)
RBC # FLD: 14.5 % — SIGNIFICANT CHANGE UP (ref 10.3–14.5)
WBC # BLD: 8.18 K/UL — SIGNIFICANT CHANGE UP (ref 3.8–10.5)
WBC # FLD AUTO: 8.18 K/UL — SIGNIFICANT CHANGE UP (ref 3.8–10.5)

## 2025-01-09 RX ORDER — ASPIRIN 81 MG
0 TABLET, DELAYED RELEASE (ENTERIC COATED) ORAL
Refills: 0 | DISCHARGE

## 2025-01-09 RX ORDER — CHOLECALCIFEROL (VITAMIN D3) 10 MCG
0 TABLET ORAL
Refills: 0 | DISCHARGE

## 2025-01-09 RX ORDER — ASCORBIC ACID 1000 MG
1 TABLET ORAL
Refills: 0 | DISCHARGE

## 2025-01-09 RX ORDER — ORAL SEMAGLUTIDE 3 MG/1
0.5 TABLET ORAL
Refills: 0 | DISCHARGE

## 2025-01-10 DIAGNOSIS — Z51.11 ENCOUNTER FOR ANTINEOPLASTIC CHEMOTHERAPY: ICD-10-CM

## 2025-01-10 DIAGNOSIS — R11.2 NAUSEA WITH VOMITING, UNSPECIFIED: ICD-10-CM

## 2025-01-10 LAB
ALBUMIN SERPL ELPH-MCNC: 4 G/DL — SIGNIFICANT CHANGE UP (ref 3.3–5)
ALP SERPL-CCNC: 95 U/L — SIGNIFICANT CHANGE UP (ref 40–120)
ALT FLD-CCNC: 23 U/L — SIGNIFICANT CHANGE UP (ref 10–45)
ANION GAP SERPL CALC-SCNC: 12 MMOL/L — SIGNIFICANT CHANGE UP (ref 5–17)
AST SERPL-CCNC: 18 U/L — SIGNIFICANT CHANGE UP (ref 10–40)
BILIRUB SERPL-MCNC: 0.2 MG/DL — SIGNIFICANT CHANGE UP (ref 0.2–1.2)
BUN SERPL-MCNC: 15 MG/DL — SIGNIFICANT CHANGE UP (ref 7–23)
CALCIUM SERPL-MCNC: 8.8 MG/DL — SIGNIFICANT CHANGE UP (ref 8.4–10.5)
CHLORIDE SERPL-SCNC: 100 MMOL/L — SIGNIFICANT CHANGE UP (ref 96–108)
CO2 SERPL-SCNC: 27 MMOL/L — SIGNIFICANT CHANGE UP (ref 22–31)
CREAT SERPL-MCNC: 0.49 MG/DL — LOW (ref 0.5–1.3)
EGFR: 101 ML/MIN/1.73M2 — SIGNIFICANT CHANGE UP
GLUCOSE SERPL-MCNC: 98 MG/DL — SIGNIFICANT CHANGE UP (ref 70–99)
POTASSIUM SERPL-MCNC: 4.3 MMOL/L — SIGNIFICANT CHANGE UP (ref 3.5–5.3)
POTASSIUM SERPL-SCNC: 4.3 MMOL/L — SIGNIFICANT CHANGE UP (ref 3.5–5.3)
PROT SERPL-MCNC: 6.3 G/DL — SIGNIFICANT CHANGE UP (ref 6–8.3)
SODIUM SERPL-SCNC: 140 MMOL/L — SIGNIFICANT CHANGE UP (ref 135–145)

## 2025-01-11 PROBLEM — Z86.39 HISTORY OF HYPERLIPIDEMIA: Status: RESOLVED | Noted: 2019-08-28 | Resolved: 2025-01-11

## 2025-01-11 RX ORDER — AMOXICILLIN 500 MG/1
500 CAPSULE ORAL 3 TIMES DAILY
Qty: 15 | Refills: 0 | Status: DISCONTINUED | COMMUNITY
Start: 2025-01-02 | End: 2025-01-11

## 2025-01-14 ENCOUNTER — NON-APPOINTMENT (OUTPATIENT)
Age: 70
End: 2025-01-14

## 2025-01-15 ENCOUNTER — APPOINTMENT (OUTPATIENT)
Dept: HEMATOLOGY ONCOLOGY | Facility: CLINIC | Age: 70
End: 2025-01-15

## 2025-01-15 DIAGNOSIS — Z86.39 PERSONAL HISTORY OF OTHER ENDOCRINE, NUTRITIONAL AND METABOLIC DISEASE: ICD-10-CM

## 2025-01-16 ENCOUNTER — APPOINTMENT (OUTPATIENT)
Dept: INFUSION THERAPY | Facility: CLINIC | Age: 70
End: 2025-01-16

## 2025-01-17 ENCOUNTER — RESULT REVIEW (OUTPATIENT)
Age: 70
End: 2025-01-17

## 2025-01-17 ENCOUNTER — APPOINTMENT (OUTPATIENT)
Dept: HEMATOLOGY ONCOLOGY | Facility: CLINIC | Age: 70
End: 2025-01-17

## 2025-01-17 ENCOUNTER — NON-APPOINTMENT (OUTPATIENT)
Age: 70
End: 2025-01-17

## 2025-01-17 VITALS
DIASTOLIC BLOOD PRESSURE: 67 MMHG | HEART RATE: 93 BPM | TEMPERATURE: 97.6 F | SYSTOLIC BLOOD PRESSURE: 105 MMHG | OXYGEN SATURATION: 96 % | WEIGHT: 225 LBS | BODY MASS INDEX: 39.87 KG/M2 | HEIGHT: 63 IN

## 2025-01-17 DIAGNOSIS — R93.8 ABNORMAL FINDINGS ON DIAGNOSTIC IMAGING OF OTHER SPECIFIED BODY STRUCTURES: ICD-10-CM

## 2025-01-17 DIAGNOSIS — R92.8 OTHER ABNORMAL AND INCONCLUSIVE FINDINGS ON DIAGNOSTIC IMAGING OF BREAST: ICD-10-CM

## 2025-01-17 DIAGNOSIS — R19.7 DIARRHEA, UNSPECIFIED: ICD-10-CM

## 2025-01-17 DIAGNOSIS — T45.1X5A NAUSEA WITH VOMITING, UNSPECIFIED: ICD-10-CM

## 2025-01-17 DIAGNOSIS — Z87.440 PERSONAL HISTORY OF URINARY (TRACT) INFECTIONS: ICD-10-CM

## 2025-01-17 DIAGNOSIS — R11.2 NAUSEA WITH VOMITING, UNSPECIFIED: ICD-10-CM

## 2025-01-17 DIAGNOSIS — Z79.69 LONG TERM (CURRENT) USE OF OTHER IMMUNOMODULATORS AND IMMUNOSUPPRESSANTS: ICD-10-CM

## 2025-01-17 DIAGNOSIS — C77.3 MALIGNANT NEOPLASM OF UNSPECIFIED SITE OF LEFT FEMALE BREAST: ICD-10-CM

## 2025-01-17 DIAGNOSIS — C50.912 MALIGNANT NEOPLASM OF UNSPECIFIED SITE OF LEFT FEMALE BREAST: ICD-10-CM

## 2025-01-17 LAB
BASOPHILS # BLD AUTO: 0 K/UL — SIGNIFICANT CHANGE UP (ref 0–0.2)
BASOPHILS NFR BLD AUTO: 0 % — SIGNIFICANT CHANGE UP (ref 0–2)
DOHLE BOD BLD QL SMEAR: PRESENT — SIGNIFICANT CHANGE UP
EOSINOPHIL # BLD AUTO: 0.04 K/UL — SIGNIFICANT CHANGE UP (ref 0–0.5)
EOSINOPHIL NFR BLD AUTO: 1 % — SIGNIFICANT CHANGE UP (ref 0–6)
GIANT PLATELETS BLD QL SMEAR: PRESENT — SIGNIFICANT CHANGE UP
HCT VFR BLD CALC: 36.1 % — SIGNIFICANT CHANGE UP (ref 34.5–45)
HGB BLD-MCNC: 12.2 G/DL — SIGNIFICANT CHANGE UP (ref 11.5–15.5)
LYMPHOCYTES # BLD AUTO: 1.45 K/UL — SIGNIFICANT CHANGE UP (ref 1–3.3)
LYMPHOCYTES # BLD AUTO: 35 % — SIGNIFICANT CHANGE UP (ref 13–44)
MCHC RBC-ENTMCNC: 29.2 PG — SIGNIFICANT CHANGE UP (ref 27–34)
MCHC RBC-ENTMCNC: 33.8 G/DL — SIGNIFICANT CHANGE UP (ref 32–36)
MCV RBC AUTO: 86.4 FL — SIGNIFICANT CHANGE UP (ref 80–100)
MONOCYTES # BLD AUTO: 0.41 K/UL — SIGNIFICANT CHANGE UP (ref 0–0.9)
MONOCYTES NFR BLD AUTO: 10 % — SIGNIFICANT CHANGE UP (ref 2–14)
NEUTROPHILS # BLD AUTO: 2.24 K/UL — SIGNIFICANT CHANGE UP (ref 1.8–7.4)
NEUTROPHILS NFR BLD AUTO: 50 % — SIGNIFICANT CHANGE UP (ref 43–77)
NEUTS BAND # BLD: 4 % — SIGNIFICANT CHANGE UP (ref 0–8)
NEUTS BAND NFR BLD: 4 % — SIGNIFICANT CHANGE UP (ref 0–8)
NRBC # BLD: 0 /100 WBCS — SIGNIFICANT CHANGE UP (ref 0–0)
NRBC # BLD: SIGNIFICANT CHANGE UP /100 WBCS (ref 0–0)
NRBC BLD-RTO: 0 /100 WBCS — SIGNIFICANT CHANGE UP (ref 0–0)
NRBC BLD-RTO: SIGNIFICANT CHANGE UP /100 WBCS (ref 0–0)
PLAT MORPH BLD: NORMAL — SIGNIFICANT CHANGE UP
PLATELET # BLD AUTO: 234 K/UL — SIGNIFICANT CHANGE UP (ref 150–400)
RBC # BLD: 4.18 M/UL — SIGNIFICANT CHANGE UP (ref 3.8–5.2)
RBC # FLD: 14 % — SIGNIFICANT CHANGE UP (ref 10.3–14.5)
RBC BLD AUTO: SIGNIFICANT CHANGE UP
TOXIC GRANULES BLD QL SMEAR: PRESENT — SIGNIFICANT CHANGE UP
WBC # BLD: 4.14 K/UL — SIGNIFICANT CHANGE UP (ref 3.8–10.5)
WBC # FLD AUTO: 4.14 K/UL — SIGNIFICANT CHANGE UP (ref 3.8–10.5)

## 2025-01-17 PROCEDURE — G2211 COMPLEX E/M VISIT ADD ON: CPT

## 2025-01-17 PROCEDURE — 99214 OFFICE O/P EST MOD 30 MIN: CPT

## 2025-01-20 LAB
ALBUMIN SERPL ELPH-MCNC: 3.8 G/DL
ALP BLD-CCNC: 97 U/L
ALT SERPL-CCNC: 14 U/L
ANION GAP SERPL CALC-SCNC: 13 MMOL/L
AST SERPL-CCNC: 14 U/L
BILIRUB SERPL-MCNC: <0.2 MG/DL
BUN SERPL-MCNC: 7 MG/DL
CALCIUM SERPL-MCNC: 8.7 MG/DL
CANCER AG27-29 SERPL-ACNC: 25.2 U/ML
CHLORIDE SERPL-SCNC: 100 MMOL/L
CO2 SERPL-SCNC: 27 MMOL/L
CREAT SERPL-MCNC: 0.47 MG/DL
EGFR: 102 ML/MIN/1.73M2
ERYTHROCYTE [SEDIMENTATION RATE] IN BLOOD BY WESTERGREN METHOD: 50 MM/HR
FERRITIN SERPL-MCNC: 657 NG/ML
FOLATE SERPL-MCNC: 13.8 NG/ML
GLUCOSE SERPL-MCNC: 137 MG/DL
IRON SATN MFR SERPL: 11 %
IRON SERPL-MCNC: 33 UG/DL
MAGNESIUM SERPL-MCNC: 1.7 MG/DL
POTASSIUM SERPL-SCNC: 3.7 MMOL/L
PROT SERPL-MCNC: 6 G/DL
SODIUM SERPL-SCNC: 140 MMOL/L
TIBC SERPL-MCNC: 297 UG/DL
UIBC SERPL-MCNC: 264 UG/DL
VIT B12 SERPL-MCNC: 954 PG/ML

## 2025-01-23 ENCOUNTER — APPOINTMENT (OUTPATIENT)
Dept: HEMATOLOGY ONCOLOGY | Facility: CLINIC | Age: 70
End: 2025-01-23

## 2025-01-23 ENCOUNTER — NON-APPOINTMENT (OUTPATIENT)
Age: 70
End: 2025-01-23

## 2025-01-23 ENCOUNTER — APPOINTMENT (OUTPATIENT)
Dept: INFUSION THERAPY | Facility: CLINIC | Age: 70
End: 2025-01-23

## 2025-01-24 ENCOUNTER — RESULT REVIEW (OUTPATIENT)
Age: 70
End: 2025-01-24

## 2025-01-24 ENCOUNTER — APPOINTMENT (OUTPATIENT)
Dept: HEMATOLOGY ONCOLOGY | Facility: CLINIC | Age: 70
End: 2025-01-24

## 2025-01-24 PROBLEM — R11.2 CINV (CHEMOTHERAPY-INDUCED NAUSEA AND VOMITING): Status: ACTIVE | Noted: 2025-01-24

## 2025-01-24 PROBLEM — Z87.440 HISTORY OF URINARY TRACT INFECTION: Status: RESOLVED | Noted: 2025-01-02 | Resolved: 2025-01-24

## 2025-01-24 PROBLEM — R19.7 DIARRHEA: Status: ACTIVE | Noted: 2025-01-23

## 2025-01-24 LAB
BASOPHILS # BLD AUTO: 0.07 K/UL — SIGNIFICANT CHANGE UP (ref 0–0.2)
BASOPHILS NFR BLD AUTO: 0.6 % — SIGNIFICANT CHANGE UP (ref 0–2)
EOSINOPHIL # BLD AUTO: 0.01 K/UL — SIGNIFICANT CHANGE UP (ref 0–0.5)
EOSINOPHIL NFR BLD AUTO: 0.1 % — SIGNIFICANT CHANGE UP (ref 0–6)
HCT VFR BLD CALC: 34.8 % — SIGNIFICANT CHANGE UP (ref 34.5–45)
HGB BLD-MCNC: 12.1 G/DL — SIGNIFICANT CHANGE UP (ref 11.5–15.5)
IMM GRANULOCYTES NFR BLD AUTO: 2.7 % — HIGH (ref 0–0.9)
LYMPHOCYTES # BLD AUTO: 1.57 K/UL — SIGNIFICANT CHANGE UP (ref 1–3.3)
LYMPHOCYTES # BLD AUTO: 13.7 % — SIGNIFICANT CHANGE UP (ref 13–44)
MCHC RBC-ENTMCNC: 29.4 PG — SIGNIFICANT CHANGE UP (ref 27–34)
MCHC RBC-ENTMCNC: 34.8 G/DL — SIGNIFICANT CHANGE UP (ref 32–36)
MCV RBC AUTO: 84.5 FL — SIGNIFICANT CHANGE UP (ref 80–100)
MONOCYTES # BLD AUTO: 0.86 K/UL — SIGNIFICANT CHANGE UP (ref 0–0.9)
MONOCYTES NFR BLD AUTO: 7.5 % — SIGNIFICANT CHANGE UP (ref 2–14)
NEUTROPHILS # BLD AUTO: 8.61 K/UL — HIGH (ref 1.8–7.4)
NEUTROPHILS NFR BLD AUTO: 75.4 % — SIGNIFICANT CHANGE UP (ref 43–77)
NRBC # BLD: 0 /100 WBCS — SIGNIFICANT CHANGE UP (ref 0–0)
NRBC BLD-RTO: 0 /100 WBCS — SIGNIFICANT CHANGE UP (ref 0–0)
PLATELET # BLD AUTO: 270 K/UL — SIGNIFICANT CHANGE UP (ref 150–400)
RBC # BLD: 4.12 M/UL — SIGNIFICANT CHANGE UP (ref 3.8–5.2)
RBC # FLD: 14.6 % — HIGH (ref 10.3–14.5)
WBC # BLD: 11.43 K/UL — HIGH (ref 3.8–10.5)
WBC # FLD AUTO: 11.43 K/UL — HIGH (ref 3.8–10.5)

## 2025-01-27 LAB
ANION GAP SERPL CALC-SCNC: 14 MMOL/L
BACTERIA STL CULT: NORMAL
BUN SERPL-MCNC: 10 MG/DL
CALCIUM SERPL-MCNC: 8.8 MG/DL
CDIFF BY PCR: NOT DETECTED
CHLORIDE SERPL-SCNC: 99 MMOL/L
CO2 SERPL-SCNC: 25 MMOL/L
CREAT SERPL-MCNC: 0.42 MG/DL
DEPRECATED O AND P PREP STL: NORMAL
EGFR: 105 ML/MIN/1.73M2
GLUCOSE SERPL-MCNC: 142 MG/DL
MAGNESIUM SERPL-MCNC: 1.9 MG/DL
POTASSIUM SERPL-SCNC: 3.3 MMOL/L
SODIUM SERPL-SCNC: 138 MMOL/L

## 2025-01-28 LAB
Lab: NOT DETECTED
NOROVIRUS GI AG STL QL: NOT DETECTED

## 2025-01-29 ENCOUNTER — RESULT REVIEW (OUTPATIENT)
Age: 70
End: 2025-01-29

## 2025-01-29 ENCOUNTER — APPOINTMENT (OUTPATIENT)
Dept: INFUSION THERAPY | Facility: CLINIC | Age: 70
End: 2025-01-29

## 2025-01-29 ENCOUNTER — APPOINTMENT (OUTPATIENT)
Dept: HEMATOLOGY ONCOLOGY | Facility: CLINIC | Age: 70
End: 2025-01-29
Payer: MEDICARE

## 2025-01-29 VITALS
HEART RATE: 92 BPM | OXYGEN SATURATION: 95 % | WEIGHT: 225 LBS | BODY MASS INDEX: 39.87 KG/M2 | SYSTOLIC BLOOD PRESSURE: 123 MMHG | DIASTOLIC BLOOD PRESSURE: 74 MMHG | HEIGHT: 63 IN | TEMPERATURE: 98 F

## 2025-01-29 DIAGNOSIS — Z79.69 LONG TERM (CURRENT) USE OF OTHER IMMUNOMODULATORS AND IMMUNOSUPPRESSANTS: ICD-10-CM

## 2025-01-29 DIAGNOSIS — C50.912 MALIGNANT NEOPLASM OF UNSPECIFIED SITE OF LEFT FEMALE BREAST: ICD-10-CM

## 2025-01-29 DIAGNOSIS — C77.3 MALIGNANT NEOPLASM OF UNSPECIFIED SITE OF LEFT FEMALE BREAST: ICD-10-CM

## 2025-01-29 LAB
ALBUMIN SERPL ELPH-MCNC: 3.4 G/DL — SIGNIFICANT CHANGE UP (ref 3.3–5)
ALP SERPL-CCNC: 86 U/L — SIGNIFICANT CHANGE UP (ref 40–120)
ALT FLD-CCNC: 14 U/L — SIGNIFICANT CHANGE UP (ref 10–45)
ANION GAP SERPL CALC-SCNC: 16 MMOL/L — SIGNIFICANT CHANGE UP (ref 5–17)
AST SERPL-CCNC: 13 U/L — SIGNIFICANT CHANGE UP (ref 10–40)
BASOPHILS # BLD AUTO: 0.08 K/UL — SIGNIFICANT CHANGE UP (ref 0–0.2)
BASOPHILS NFR BLD AUTO: 0.9 % — SIGNIFICANT CHANGE UP (ref 0–2)
BILIRUB SERPL-MCNC: <0.2 MG/DL — SIGNIFICANT CHANGE UP (ref 0.2–1.2)
BUN SERPL-MCNC: 13 MG/DL — SIGNIFICANT CHANGE UP (ref 7–23)
CALCIUM SERPL-MCNC: 8.9 MG/DL — SIGNIFICANT CHANGE UP (ref 8.4–10.5)
CHLORIDE SERPL-SCNC: 99 MMOL/L — SIGNIFICANT CHANGE UP (ref 96–108)
CO2 SERPL-SCNC: 24 MMOL/L — SIGNIFICANT CHANGE UP (ref 22–31)
CREAT SERPL-MCNC: 0.47 MG/DL — LOW (ref 0.5–1.3)
EGFR: 102 ML/MIN/1.73M2 — SIGNIFICANT CHANGE UP
EOSINOPHIL # BLD AUTO: 0.01 K/UL — SIGNIFICANT CHANGE UP (ref 0–0.5)
EOSINOPHIL NFR BLD AUTO: 0.1 % — SIGNIFICANT CHANGE UP (ref 0–6)
GLUCOSE SERPL-MCNC: 139 MG/DL — HIGH (ref 70–99)
HCT VFR BLD CALC: 34.3 % — LOW (ref 34.5–45)
HGB BLD-MCNC: 11.9 G/DL — SIGNIFICANT CHANGE UP (ref 11.5–15.5)
IMM GRANULOCYTES NFR BLD AUTO: 0.7 % — SIGNIFICANT CHANGE UP (ref 0–0.9)
LYMPHOCYTES # BLD AUTO: 1.56 K/UL — SIGNIFICANT CHANGE UP (ref 1–3.3)
LYMPHOCYTES # BLD AUTO: 16.6 % — SIGNIFICANT CHANGE UP (ref 13–44)
MCHC RBC-ENTMCNC: 29.6 PG — SIGNIFICANT CHANGE UP (ref 27–34)
MCHC RBC-ENTMCNC: 34.7 G/DL — SIGNIFICANT CHANGE UP (ref 32–36)
MCV RBC AUTO: 85.3 FL — SIGNIFICANT CHANGE UP (ref 80–100)
MONOCYTES # BLD AUTO: 0.62 K/UL — SIGNIFICANT CHANGE UP (ref 0–0.9)
MONOCYTES NFR BLD AUTO: 6.6 % — SIGNIFICANT CHANGE UP (ref 2–14)
NEUTROPHILS # BLD AUTO: 7.05 K/UL — SIGNIFICANT CHANGE UP (ref 1.8–7.4)
NEUTROPHILS NFR BLD AUTO: 75.1 % — SIGNIFICANT CHANGE UP (ref 43–77)
NRBC # BLD: 0 /100 WBCS — SIGNIFICANT CHANGE UP (ref 0–0)
NRBC BLD-RTO: 0 /100 WBCS — SIGNIFICANT CHANGE UP (ref 0–0)
PLATELET # BLD AUTO: 453 K/UL — HIGH (ref 150–400)
POTASSIUM SERPL-MCNC: 3.9 MMOL/L — SIGNIFICANT CHANGE UP (ref 3.5–5.3)
POTASSIUM SERPL-SCNC: 3.9 MMOL/L — SIGNIFICANT CHANGE UP (ref 3.5–5.3)
PROT SERPL-MCNC: 5.7 G/DL — LOW (ref 6–8.3)
RBC # BLD: 4.02 M/UL — SIGNIFICANT CHANGE UP (ref 3.8–5.2)
RBC # FLD: 14.9 % — HIGH (ref 10.3–14.5)
SODIUM SERPL-SCNC: 139 MMOL/L — SIGNIFICANT CHANGE UP (ref 135–145)
WBC # BLD: 9.39 K/UL — SIGNIFICANT CHANGE UP (ref 3.8–10.5)
WBC # FLD AUTO: 9.39 K/UL — SIGNIFICANT CHANGE UP (ref 3.8–10.5)

## 2025-01-29 PROCEDURE — 99215 OFFICE O/P EST HI 40 MIN: CPT

## 2025-01-30 ENCOUNTER — NON-APPOINTMENT (OUTPATIENT)
Age: 70
End: 2025-01-30

## 2025-01-30 ENCOUNTER — APPOINTMENT (OUTPATIENT)
Dept: INFUSION THERAPY | Facility: CLINIC | Age: 70
End: 2025-01-30

## 2025-01-30 DIAGNOSIS — Z51.89 ENCOUNTER FOR OTHER SPECIFIED AFTERCARE: ICD-10-CM

## 2025-01-30 LAB
C DIFF TOX B STL QL CT TISS CULT: NORMAL
Lab: NORMAL

## 2025-02-07 ENCOUNTER — NON-APPOINTMENT (OUTPATIENT)
Age: 70
End: 2025-02-07

## 2025-02-13 ENCOUNTER — APPOINTMENT (OUTPATIENT)
Dept: INFUSION THERAPY | Facility: CLINIC | Age: 70
End: 2025-02-13

## 2025-02-13 ENCOUNTER — RESULT REVIEW (OUTPATIENT)
Age: 70
End: 2025-02-13

## 2025-02-13 ENCOUNTER — OUTPATIENT (OUTPATIENT)
Dept: OUTPATIENT SERVICES | Facility: HOSPITAL | Age: 70
LOS: 1 days | Discharge: HOME | End: 2025-02-13

## 2025-02-13 VITALS
SYSTOLIC BLOOD PRESSURE: 136 MMHG | BODY MASS INDEX: 39.23 KG/M2 | TEMPERATURE: 97.6 F | HEART RATE: 97 BPM | HEIGHT: 63 IN | DIASTOLIC BLOOD PRESSURE: 84 MMHG | OXYGEN SATURATION: 95 % | WEIGHT: 221.38 LBS

## 2025-02-13 DIAGNOSIS — C50.912 MALIGNANT NEOPLASM OF UNSPECIFIED SITE OF LEFT FEMALE BREAST: ICD-10-CM

## 2025-02-13 DIAGNOSIS — Z98.890 OTHER SPECIFIED POSTPROCEDURAL STATES: Chronic | ICD-10-CM

## 2025-02-13 DIAGNOSIS — R92.8 OTHER ABNORMAL AND INCONCLUSIVE FINDINGS ON DIAGNOSTIC IMAGING OF BREAST: ICD-10-CM

## 2025-02-13 LAB
ALBUMIN SERPL ELPH-MCNC: 3.6 G/DL — SIGNIFICANT CHANGE UP (ref 3.3–5)
ALP SERPL-CCNC: 108 U/L — SIGNIFICANT CHANGE UP (ref 40–120)
ALT FLD-CCNC: 24 U/L — SIGNIFICANT CHANGE UP (ref 10–45)
ANION GAP SERPL CALC-SCNC: 11 MMOL/L — SIGNIFICANT CHANGE UP (ref 5–17)
AST SERPL-CCNC: 20 U/L — SIGNIFICANT CHANGE UP (ref 10–40)
BASOPHILS # BLD AUTO: 0.06 K/UL — SIGNIFICANT CHANGE UP (ref 0–0.2)
BASOPHILS NFR BLD AUTO: 0.5 % — SIGNIFICANT CHANGE UP (ref 0–2)
BILIRUB SERPL-MCNC: <0.2 MG/DL — SIGNIFICANT CHANGE UP (ref 0.2–1.2)
BUN SERPL-MCNC: 10 MG/DL — SIGNIFICANT CHANGE UP (ref 7–23)
CALCIUM SERPL-MCNC: 8.5 MG/DL — SIGNIFICANT CHANGE UP (ref 8.4–10.5)
CHLORIDE SERPL-SCNC: 101 MMOL/L — SIGNIFICANT CHANGE UP (ref 96–108)
CO2 SERPL-SCNC: 26 MMOL/L — SIGNIFICANT CHANGE UP (ref 22–31)
CREAT SERPL-MCNC: 0.42 MG/DL — LOW (ref 0.5–1.3)
EGFR: 105 ML/MIN/1.73M2 — SIGNIFICANT CHANGE UP
EOSINOPHIL # BLD AUTO: 0.01 K/UL — SIGNIFICANT CHANGE UP (ref 0–0.5)
EOSINOPHIL NFR BLD AUTO: 0.1 % — SIGNIFICANT CHANGE UP (ref 0–6)
GLUCOSE SERPL-MCNC: 157 MG/DL — HIGH (ref 70–99)
HCT VFR BLD CALC: 33.4 % — LOW (ref 34.5–45)
HGB BLD-MCNC: 11.4 G/DL — LOW (ref 11.5–15.5)
IMM GRANULOCYTES NFR BLD AUTO: 2.7 % — HIGH (ref 0–0.9)
LYMPHOCYTES # BLD AUTO: 1.8 K/UL — SIGNIFICANT CHANGE UP (ref 1–3.3)
LYMPHOCYTES # BLD AUTO: 13.7 % — SIGNIFICANT CHANGE UP (ref 13–44)
MCHC RBC-ENTMCNC: 29.2 PG — SIGNIFICANT CHANGE UP (ref 27–34)
MCHC RBC-ENTMCNC: 34.1 G/DL — SIGNIFICANT CHANGE UP (ref 32–36)
MCV RBC AUTO: 85.6 FL — SIGNIFICANT CHANGE UP (ref 80–100)
MONOCYTES # BLD AUTO: 0.77 K/UL — SIGNIFICANT CHANGE UP (ref 0–0.9)
MONOCYTES NFR BLD AUTO: 5.9 % — SIGNIFICANT CHANGE UP (ref 2–14)
NEUTROPHILS # BLD AUTO: 10.12 K/UL — HIGH (ref 1.8–7.4)
NEUTROPHILS NFR BLD AUTO: 77.1 % — HIGH (ref 43–77)
NRBC BLD AUTO-RTO: 0 /100 WBCS — SIGNIFICANT CHANGE UP (ref 0–0)
PLATELET # BLD AUTO: 223 K/UL — SIGNIFICANT CHANGE UP (ref 150–400)
POTASSIUM SERPL-MCNC: 3.6 MMOL/L — SIGNIFICANT CHANGE UP (ref 3.5–5.3)
POTASSIUM SERPL-SCNC: 3.6 MMOL/L — SIGNIFICANT CHANGE UP (ref 3.5–5.3)
PROT SERPL-MCNC: 5.4 G/DL — LOW (ref 6–8.3)
RBC # BLD: 3.9 M/UL — SIGNIFICANT CHANGE UP (ref 3.8–5.2)
RBC # FLD: 15.9 % — HIGH (ref 10.3–14.5)
SODIUM SERPL-SCNC: 138 MMOL/L — SIGNIFICANT CHANGE UP (ref 135–145)
WBC # BLD: 13.11 K/UL — HIGH (ref 3.8–10.5)
WBC # FLD AUTO: 13.11 K/UL — HIGH (ref 3.8–10.5)

## 2025-02-14 DIAGNOSIS — Z51.11 ENCOUNTER FOR ANTINEOPLASTIC CHEMOTHERAPY: ICD-10-CM

## 2025-02-14 DIAGNOSIS — R11.2 NAUSEA WITH VOMITING, UNSPECIFIED: ICD-10-CM

## 2025-02-14 DIAGNOSIS — Z51.89 ENCOUNTER FOR OTHER SPECIFIED AFTERCARE: ICD-10-CM

## 2025-02-19 ENCOUNTER — APPOINTMENT (OUTPATIENT)
Dept: HEMATOLOGY ONCOLOGY | Facility: CLINIC | Age: 70
End: 2025-02-19
Payer: MEDICARE

## 2025-02-19 ENCOUNTER — RESULT REVIEW (OUTPATIENT)
Age: 70
End: 2025-02-19

## 2025-02-19 VITALS
HEIGHT: 63 IN | HEART RATE: 94 BPM | WEIGHT: 220.19 LBS | BODY MASS INDEX: 39.02 KG/M2 | DIASTOLIC BLOOD PRESSURE: 71 MMHG | OXYGEN SATURATION: 97 % | TEMPERATURE: 98 F | SYSTOLIC BLOOD PRESSURE: 109 MMHG

## 2025-02-19 DIAGNOSIS — R11.2 NAUSEA WITH VOMITING, UNSPECIFIED: ICD-10-CM

## 2025-02-19 DIAGNOSIS — T45.1X5A NAUSEA WITH VOMITING, UNSPECIFIED: ICD-10-CM

## 2025-02-19 DIAGNOSIS — R19.7 DIARRHEA, UNSPECIFIED: ICD-10-CM

## 2025-02-19 LAB
BASOPHILS # BLD AUTO: 0.08 K/UL — SIGNIFICANT CHANGE UP (ref 0–0.2)
BASOPHILS NFR BLD AUTO: 1.9 % — SIGNIFICANT CHANGE UP (ref 0–2)
EOSINOPHIL # BLD AUTO: 0.01 K/UL — SIGNIFICANT CHANGE UP (ref 0–0.5)
EOSINOPHIL NFR BLD AUTO: 0.2 % — SIGNIFICANT CHANGE UP (ref 0–6)
HCT VFR BLD CALC: 33.8 % — LOW (ref 34.5–45)
HGB BLD-MCNC: 11.6 G/DL — SIGNIFICANT CHANGE UP (ref 11.5–15.5)
IMM GRANULOCYTES NFR BLD AUTO: 2.3 % — HIGH (ref 0–0.9)
LYMPHOCYTES # BLD AUTO: 0.66 K/UL — LOW (ref 1–3.3)
LYMPHOCYTES # BLD AUTO: 15.3 % — SIGNIFICANT CHANGE UP (ref 13–44)
MCHC RBC-ENTMCNC: 29.5 PG — SIGNIFICANT CHANGE UP (ref 27–34)
MCHC RBC-ENTMCNC: 34.3 G/DL — SIGNIFICANT CHANGE UP (ref 32–36)
MCV RBC AUTO: 86 FL — SIGNIFICANT CHANGE UP (ref 80–100)
MONOCYTES # BLD AUTO: 0.16 K/UL — SIGNIFICANT CHANGE UP (ref 0–0.9)
MONOCYTES NFR BLD AUTO: 3.7 % — SIGNIFICANT CHANGE UP (ref 2–14)
NEUTROPHILS # BLD AUTO: 3.31 K/UL — SIGNIFICANT CHANGE UP (ref 1.8–7.4)
NEUTROPHILS NFR BLD AUTO: 76.6 % — SIGNIFICANT CHANGE UP (ref 43–77)
NRBC BLD AUTO-RTO: 0 /100 WBCS — SIGNIFICANT CHANGE UP (ref 0–0)
PLATELET # BLD AUTO: 254 K/UL — SIGNIFICANT CHANGE UP (ref 150–400)
RBC # BLD: 3.93 M/UL — SIGNIFICANT CHANGE UP (ref 3.8–5.2)
RBC # FLD: 15.9 % — HIGH (ref 10.3–14.5)
WBC # BLD: 4.32 K/UL — SIGNIFICANT CHANGE UP (ref 3.8–10.5)
WBC # FLD AUTO: 4.32 K/UL — SIGNIFICANT CHANGE UP (ref 3.8–10.5)

## 2025-02-19 PROCEDURE — 99213 OFFICE O/P EST LOW 20 MIN: CPT

## 2025-02-19 PROCEDURE — G2211 COMPLEX E/M VISIT ADD ON: CPT

## 2025-02-26 ENCOUNTER — RESULT REVIEW (OUTPATIENT)
Age: 70
End: 2025-02-26

## 2025-02-26 ENCOUNTER — APPOINTMENT (OUTPATIENT)
Dept: HEMATOLOGY ONCOLOGY | Facility: CLINIC | Age: 70
End: 2025-02-26
Payer: MEDICARE

## 2025-02-26 ENCOUNTER — NON-APPOINTMENT (OUTPATIENT)
Age: 70
End: 2025-02-26

## 2025-02-26 ENCOUNTER — APPOINTMENT (OUTPATIENT)
Dept: INFUSION THERAPY | Facility: CLINIC | Age: 70
End: 2025-02-26

## 2025-02-26 VITALS
HEIGHT: 63 IN | WEIGHT: 218 LBS | SYSTOLIC BLOOD PRESSURE: 106 MMHG | DIASTOLIC BLOOD PRESSURE: 65 MMHG | HEART RATE: 91 BPM | BODY MASS INDEX: 38.62 KG/M2 | TEMPERATURE: 97.6 F | OXYGEN SATURATION: 96 %

## 2025-02-26 DIAGNOSIS — Z79.69 LONG TERM (CURRENT) USE OF OTHER IMMUNOMODULATORS AND IMMUNOSUPPRESSANTS: ICD-10-CM

## 2025-02-26 LAB
ALBUMIN SERPL ELPH-MCNC: 3.6 G/DL — SIGNIFICANT CHANGE UP (ref 3.3–5)
ALP SERPL-CCNC: 112 U/L — SIGNIFICANT CHANGE UP (ref 40–120)
ALT FLD-CCNC: 14 U/L — SIGNIFICANT CHANGE UP (ref 10–45)
ANION GAP SERPL CALC-SCNC: 12 MMOL/L — SIGNIFICANT CHANGE UP (ref 5–17)
ANISOCYTOSIS BLD QL: SLIGHT — SIGNIFICANT CHANGE UP
AST SERPL-CCNC: 17 U/L — SIGNIFICANT CHANGE UP (ref 10–40)
BASOPHILS # BLD AUTO: 0 K/UL — SIGNIFICANT CHANGE UP (ref 0–0.2)
BASOPHILS NFR BLD AUTO: 0 % — SIGNIFICANT CHANGE UP (ref 0–2)
BILIRUB SERPL-MCNC: <0.2 MG/DL — SIGNIFICANT CHANGE UP (ref 0.2–1.2)
BUN SERPL-MCNC: 11 MG/DL — SIGNIFICANT CHANGE UP (ref 7–23)
CALCIUM SERPL-MCNC: 8.5 MG/DL — SIGNIFICANT CHANGE UP (ref 8.4–10.5)
CHLORIDE SERPL-SCNC: 101 MMOL/L — SIGNIFICANT CHANGE UP (ref 96–108)
CO2 SERPL-SCNC: 27 MMOL/L — SIGNIFICANT CHANGE UP (ref 22–31)
CREAT SERPL-MCNC: 0.47 MG/DL — LOW (ref 0.5–1.3)
EGFR: 102 ML/MIN/1.73M2 — SIGNIFICANT CHANGE UP
EGFR: 102 ML/MIN/1.73M2 — SIGNIFICANT CHANGE UP
EOSINOPHIL # BLD AUTO: 0 K/UL — SIGNIFICANT CHANGE UP (ref 0–0.5)
EOSINOPHIL NFR BLD AUTO: 0 % — SIGNIFICANT CHANGE UP (ref 0–6)
GLUCOSE SERPL-MCNC: 165 MG/DL — HIGH (ref 70–99)
HCT VFR BLD CALC: 33.2 % — LOW (ref 34.5–45)
HGB BLD-MCNC: 11.2 G/DL — LOW (ref 11.5–15.5)
LG PLATELETS BLD QL AUTO: SLIGHT — SIGNIFICANT CHANGE UP
LYMPHOCYTES # BLD AUTO: 1.66 K/UL — SIGNIFICANT CHANGE UP (ref 1–3.3)
LYMPHOCYTES # BLD AUTO: 11 % — LOW (ref 13–44)
MCHC RBC-ENTMCNC: 29.4 PG — SIGNIFICANT CHANGE UP (ref 27–34)
MCHC RBC-ENTMCNC: 33.7 G/DL — SIGNIFICANT CHANGE UP (ref 32–36)
MCV RBC AUTO: 87.1 FL — SIGNIFICANT CHANGE UP (ref 80–100)
METAMYELOCYTES # FLD: 4 % — HIGH (ref 0–0)
METAMYELOCYTES NFR BLD: 4 % — HIGH (ref 0–0)
MICROCYTES BLD QL: SLIGHT — SIGNIFICANT CHANGE UP
MONOCYTES # BLD AUTO: 0.3 K/UL — SIGNIFICANT CHANGE UP (ref 0–0.9)
MONOCYTES NFR BLD AUTO: 2 % — SIGNIFICANT CHANGE UP (ref 2–14)
MYELOCYTES NFR BLD: 1 % — HIGH (ref 0–0)
NEUTROPHILS # BLD AUTO: 11.75 K/UL — HIGH (ref 1.8–7.4)
NEUTROPHILS NFR BLD AUTO: 70 % — SIGNIFICANT CHANGE UP (ref 43–77)
NEUTS BAND # BLD: 8 % — SIGNIFICANT CHANGE UP (ref 0–8)
NEUTS BAND NFR BLD: 8 % — SIGNIFICANT CHANGE UP (ref 0–8)
NRBC # BLD: 0 /100 WBCS — SIGNIFICANT CHANGE UP (ref 0–0)
NRBC BLD AUTO-RTO: SIGNIFICANT CHANGE UP /100 WBCS (ref 0–0)
NRBC BLD-RTO: 0 /100 WBCS — SIGNIFICANT CHANGE UP (ref 0–0)
OVALOCYTES BLD QL SMEAR: SLIGHT — SIGNIFICANT CHANGE UP
PLAT MORPH BLD: NORMAL — SIGNIFICANT CHANGE UP
PLATELET # BLD AUTO: 196 K/UL — SIGNIFICANT CHANGE UP (ref 150–400)
POLYCHROMASIA BLD QL SMEAR: SLIGHT — SIGNIFICANT CHANGE UP
POTASSIUM SERPL-MCNC: 3.4 MMOL/L — LOW (ref 3.5–5.3)
POTASSIUM SERPL-SCNC: 3.4 MMOL/L — LOW (ref 3.5–5.3)
PROMYELOCYTES # FLD: 1 % — HIGH (ref 0–0)
PROMYELOCYTES NFR BLD: 1 % — HIGH (ref 0–0)
PROT SERPL-MCNC: 5.5 G/DL — LOW (ref 6–8.3)
RBC # BLD: 3.81 M/UL — SIGNIFICANT CHANGE UP (ref 3.8–5.2)
RBC # FLD: 17 % — HIGH (ref 10.3–14.5)
RBC BLD AUTO: SIGNIFICANT CHANGE UP
SODIUM SERPL-SCNC: 139 MMOL/L — SIGNIFICANT CHANGE UP (ref 135–145)
VARIANT LYMPHS # BLD: 3 % — SIGNIFICANT CHANGE UP (ref 0–6)
VARIANT LYMPHS NFR BLD MANUAL: 3 % — SIGNIFICANT CHANGE UP (ref 0–6)
WBC # BLD: 15.07 K/UL — HIGH (ref 3.8–10.5)
WBC # FLD AUTO: 15.07 K/UL — HIGH (ref 3.8–10.5)

## 2025-02-26 PROCEDURE — 99215 OFFICE O/P EST HI 40 MIN: CPT

## 2025-03-07 ENCOUNTER — APPOINTMENT (OUTPATIENT)
Dept: BREAST CENTER | Facility: CLINIC | Age: 70
End: 2025-03-07
Payer: MEDICARE

## 2025-03-07 VITALS
SYSTOLIC BLOOD PRESSURE: 89 MMHG | DIASTOLIC BLOOD PRESSURE: 66 MMHG | HEART RATE: 97 BPM | HEIGHT: 63 IN | BODY MASS INDEX: 38.62 KG/M2 | WEIGHT: 218 LBS

## 2025-03-07 DIAGNOSIS — C50.912 MALIGNANT NEOPLASM OF UNSPECIFIED SITE OF LEFT FEMALE BREAST: ICD-10-CM

## 2025-03-07 DIAGNOSIS — Z91.89 OTHER SPECIFIED PERSONAL RISK FACTORS, NOT ELSEWHERE CLASSIFIED: ICD-10-CM

## 2025-03-07 DIAGNOSIS — C77.3 MALIGNANT NEOPLASM OF UNSPECIFIED SITE OF LEFT FEMALE BREAST: ICD-10-CM

## 2025-03-07 PROCEDURE — 99213 OFFICE O/P EST LOW 20 MIN: CPT

## 2025-03-07 PROCEDURE — 93702 BIS XTRACELL FLUID ANALYSIS: CPT

## 2025-03-12 ENCOUNTER — RESULT REVIEW (OUTPATIENT)
Age: 70
End: 2025-03-12

## 2025-03-12 ENCOUNTER — APPOINTMENT (OUTPATIENT)
Dept: INFUSION THERAPY | Facility: CLINIC | Age: 70
End: 2025-03-12

## 2025-03-12 ENCOUNTER — APPOINTMENT (OUTPATIENT)
Dept: RADIATION ONCOLOGY | Facility: CLINIC | Age: 70
End: 2025-03-12

## 2025-03-12 ENCOUNTER — NON-APPOINTMENT (OUTPATIENT)
Age: 70
End: 2025-03-12

## 2025-03-12 VITALS
DIASTOLIC BLOOD PRESSURE: 68 MMHG | BODY MASS INDEX: 38.27 KG/M2 | TEMPERATURE: 97.4 F | HEART RATE: 96 BPM | HEIGHT: 63 IN | WEIGHT: 216 LBS | OXYGEN SATURATION: 96 % | SYSTOLIC BLOOD PRESSURE: 100 MMHG

## 2025-03-12 LAB
ALBUMIN SERPL ELPH-MCNC: 3.6 G/DL — SIGNIFICANT CHANGE UP (ref 3.3–5)
ALP SERPL-CCNC: 99 U/L — SIGNIFICANT CHANGE UP (ref 40–120)
ALT FLD-CCNC: 15 U/L — SIGNIFICANT CHANGE UP (ref 10–45)
ANION GAP SERPL CALC-SCNC: 11 MMOL/L — SIGNIFICANT CHANGE UP (ref 5–17)
AST SERPL-CCNC: 17 U/L — SIGNIFICANT CHANGE UP (ref 10–40)
BASOPHILS # BLD AUTO: 0 K/UL — SIGNIFICANT CHANGE UP (ref 0–0.2)
BASOPHILS NFR BLD AUTO: 0 % — SIGNIFICANT CHANGE UP (ref 0–2)
BILIRUB SERPL-MCNC: <0.2 MG/DL — SIGNIFICANT CHANGE UP (ref 0.2–1.2)
BUN SERPL-MCNC: 9 MG/DL — SIGNIFICANT CHANGE UP (ref 7–23)
CALCIUM SERPL-MCNC: 8.6 MG/DL — SIGNIFICANT CHANGE UP (ref 8.4–10.5)
CHLORIDE SERPL-SCNC: 99 MMOL/L — SIGNIFICANT CHANGE UP (ref 96–108)
CO2 SERPL-SCNC: 28 MMOL/L — SIGNIFICANT CHANGE UP (ref 22–31)
CREAT SERPL-MCNC: 0.42 MG/DL — LOW (ref 0.5–1.3)
EGFR: 105 ML/MIN/1.73M2 — SIGNIFICANT CHANGE UP
EGFR: 105 ML/MIN/1.73M2 — SIGNIFICANT CHANGE UP
EOSINOPHIL # BLD AUTO: 0 K/UL — SIGNIFICANT CHANGE UP (ref 0–0.5)
EOSINOPHIL NFR BLD AUTO: 0 % — SIGNIFICANT CHANGE UP (ref 0–6)
GLUCOSE SERPL-MCNC: 135 MG/DL — HIGH (ref 70–99)
HCT VFR BLD CALC: 31.2 % — LOW (ref 34.5–45)
HGB BLD-MCNC: 10.7 G/DL — LOW (ref 11.5–15.5)
HYPOCHROMIA BLD QL: SLIGHT — SIGNIFICANT CHANGE UP
LG PLATELETS BLD QL AUTO: SLIGHT — SIGNIFICANT CHANGE UP
LYMPHOCYTES # BLD AUTO: 1.6 K/UL — SIGNIFICANT CHANGE UP (ref 1–3.3)
LYMPHOCYTES # BLD AUTO: 12 % — LOW (ref 13–44)
MACROCYTES BLD QL: SLIGHT — SIGNIFICANT CHANGE UP
MCHC RBC-ENTMCNC: 29.9 PG — SIGNIFICANT CHANGE UP (ref 27–34)
MCHC RBC-ENTMCNC: 34.3 G/DL — SIGNIFICANT CHANGE UP (ref 32–36)
MCV RBC AUTO: 87.2 FL — SIGNIFICANT CHANGE UP (ref 80–100)
METAMYELOCYTES # FLD: 1 % — HIGH (ref 0–0)
METAMYELOCYTES NFR BLD: 1 % — HIGH (ref 0–0)
MONOCYTES # BLD AUTO: 0.8 K/UL — SIGNIFICANT CHANGE UP (ref 0–0.9)
MONOCYTES NFR BLD AUTO: 6 % — SIGNIFICANT CHANGE UP (ref 2–14)
MYELOCYTES NFR BLD: 3 % — HIGH (ref 0–0)
NEUTROPHILS # BLD AUTO: 10.38 K/UL — HIGH (ref 1.8–7.4)
NEUTROPHILS NFR BLD AUTO: 78 % — HIGH (ref 43–77)
NRBC # BLD: 0 /100 WBCS — SIGNIFICANT CHANGE UP (ref 0–0)
NRBC BLD AUTO-RTO: SIGNIFICANT CHANGE UP /100 WBCS (ref 0–0)
NRBC BLD-RTO: 0 /100 WBCS — SIGNIFICANT CHANGE UP (ref 0–0)
PLAT MORPH BLD: NORMAL — SIGNIFICANT CHANGE UP
PLATELET # BLD AUTO: 273 K/UL — SIGNIFICANT CHANGE UP (ref 150–400)
POLYCHROMASIA BLD QL SMEAR: SLIGHT — SIGNIFICANT CHANGE UP
POTASSIUM SERPL-MCNC: 3.9 MMOL/L — SIGNIFICANT CHANGE UP (ref 3.5–5.3)
POTASSIUM SERPL-SCNC: 3.9 MMOL/L — SIGNIFICANT CHANGE UP (ref 3.5–5.3)
PROT SERPL-MCNC: 5.5 G/DL — LOW (ref 6–8.3)
RBC # BLD: 3.58 M/UL — LOW (ref 3.8–5.2)
RBC # FLD: 18 % — HIGH (ref 10.3–14.5)
RBC BLD AUTO: SIGNIFICANT CHANGE UP
SODIUM SERPL-SCNC: 139 MMOL/L — SIGNIFICANT CHANGE UP (ref 135–145)
WBC # BLD: 13.31 K/UL — HIGH (ref 3.8–10.5)
WBC # FLD AUTO: 13.31 K/UL — HIGH (ref 3.8–10.5)

## 2025-03-19 ENCOUNTER — RESULT REVIEW (OUTPATIENT)
Age: 70
End: 2025-03-19

## 2025-03-19 ENCOUNTER — APPOINTMENT (OUTPATIENT)
Dept: INFUSION THERAPY | Facility: CLINIC | Age: 70
End: 2025-03-19

## 2025-03-19 VITALS
BODY MASS INDEX: 38.27 KG/M2 | DIASTOLIC BLOOD PRESSURE: 68 MMHG | WEIGHT: 216 LBS | SYSTOLIC BLOOD PRESSURE: 109 MMHG | TEMPERATURE: 97.5 F | OXYGEN SATURATION: 95 % | HEIGHT: 63 IN | HEART RATE: 98 BPM

## 2025-03-19 LAB
BASOPHILS # BLD AUTO: 0.07 K/UL — SIGNIFICANT CHANGE UP (ref 0–0.2)
BASOPHILS NFR BLD AUTO: 0.8 % — SIGNIFICANT CHANGE UP (ref 0–2)
EOSINOPHIL # BLD AUTO: 0.01 K/UL — SIGNIFICANT CHANGE UP (ref 0–0.5)
EOSINOPHIL NFR BLD AUTO: 0.1 % — SIGNIFICANT CHANGE UP (ref 0–6)
HCT VFR BLD CALC: 29.9 % — LOW (ref 34.5–45)
HGB BLD-MCNC: 10.1 G/DL — LOW (ref 11.5–15.5)
IMM GRANULOCYTES NFR BLD AUTO: 1 % — HIGH (ref 0–0.9)
LYMPHOCYTES # BLD AUTO: 1.1 K/UL — SIGNIFICANT CHANGE UP (ref 1–3.3)
LYMPHOCYTES # BLD AUTO: 12.7 % — LOW (ref 13–44)
MCHC RBC-ENTMCNC: 29.8 PG — SIGNIFICANT CHANGE UP (ref 27–34)
MCHC RBC-ENTMCNC: 33.8 G/DL — SIGNIFICANT CHANGE UP (ref 32–36)
MCV RBC AUTO: 88.2 FL — SIGNIFICANT CHANGE UP (ref 80–100)
MONOCYTES # BLD AUTO: 0.57 K/UL — SIGNIFICANT CHANGE UP (ref 0–0.9)
MONOCYTES NFR BLD AUTO: 6.6 % — SIGNIFICANT CHANGE UP (ref 2–14)
NEUTROPHILS # BLD AUTO: 6.85 K/UL — SIGNIFICANT CHANGE UP (ref 1.8–7.4)
NEUTROPHILS NFR BLD AUTO: 78.8 % — HIGH (ref 43–77)
NRBC BLD AUTO-RTO: 0 /100 WBCS — SIGNIFICANT CHANGE UP (ref 0–0)
PLATELET # BLD AUTO: 430 K/UL — HIGH (ref 150–400)
RBC # BLD: 3.39 M/UL — LOW (ref 3.8–5.2)
RBC # FLD: 19.3 % — HIGH (ref 10.3–14.5)
WBC # BLD: 8.69 K/UL — SIGNIFICANT CHANGE UP (ref 3.8–10.5)
WBC # FLD AUTO: 8.69 K/UL — SIGNIFICANT CHANGE UP (ref 3.8–10.5)

## 2025-03-24 ENCOUNTER — APPOINTMENT (OUTPATIENT)
Dept: PHYSICAL MEDICINE AND REHAB | Facility: CLINIC | Age: 70
End: 2025-03-24
Payer: MEDICARE

## 2025-03-24 VITALS — BODY MASS INDEX: 38.27 KG/M2 | HEIGHT: 63 IN | WEIGHT: 216 LBS

## 2025-03-24 DIAGNOSIS — G89.28 OTHER CHRONIC POSTPROCEDURAL PAIN: ICD-10-CM

## 2025-03-24 DIAGNOSIS — R60.9 EDEMA, UNSPECIFIED: ICD-10-CM

## 2025-03-24 PROBLEM — I89.0 LYMPHEDEMA: Status: ACTIVE | Noted: 2025-03-24

## 2025-03-24 PROCEDURE — 99204 OFFICE O/P NEW MOD 45 MIN: CPT

## 2025-03-26 ENCOUNTER — APPOINTMENT (OUTPATIENT)
Dept: INFUSION THERAPY | Facility: CLINIC | Age: 70
End: 2025-03-26
Payer: MEDICARE

## 2025-03-26 ENCOUNTER — APPOINTMENT (OUTPATIENT)
Dept: HEMATOLOGY ONCOLOGY | Facility: CLINIC | Age: 70
End: 2025-03-26
Payer: MEDICARE

## 2025-03-26 ENCOUNTER — RESULT REVIEW (OUTPATIENT)
Age: 70
End: 2025-03-26

## 2025-03-26 VITALS
WEIGHT: 216 LBS | OXYGEN SATURATION: 96 % | HEART RATE: 94 BPM | BODY MASS INDEX: 38.27 KG/M2 | TEMPERATURE: 98.4 F | SYSTOLIC BLOOD PRESSURE: 105 MMHG | HEIGHT: 63 IN | DIASTOLIC BLOOD PRESSURE: 58 MMHG

## 2025-03-26 DIAGNOSIS — T45.1X5A NAUSEA WITH VOMITING, UNSPECIFIED: ICD-10-CM

## 2025-03-26 DIAGNOSIS — Z79.69 LONG TERM (CURRENT) USE OF OTHER IMMUNOMODULATORS AND IMMUNOSUPPRESSANTS: ICD-10-CM

## 2025-03-26 DIAGNOSIS — R19.7 DIARRHEA, UNSPECIFIED: ICD-10-CM

## 2025-03-26 DIAGNOSIS — C50.912 MALIGNANT NEOPLASM OF UNSPECIFIED SITE OF LEFT FEMALE BREAST: ICD-10-CM

## 2025-03-26 DIAGNOSIS — R11.2 NAUSEA WITH VOMITING, UNSPECIFIED: ICD-10-CM

## 2025-03-26 DIAGNOSIS — C77.3 MALIGNANT NEOPLASM OF UNSPECIFIED SITE OF LEFT FEMALE BREAST: ICD-10-CM

## 2025-03-26 DIAGNOSIS — I89.0 LYMPHEDEMA, NOT ELSEWHERE CLASSIFIED: ICD-10-CM

## 2025-03-26 LAB
BASOPHILS # BLD AUTO: 0.05 K/UL — SIGNIFICANT CHANGE UP (ref 0–0.2)
BASOPHILS NFR BLD AUTO: 0.7 % — SIGNIFICANT CHANGE UP (ref 0–2)
EOSINOPHIL # BLD AUTO: 0.07 K/UL — SIGNIFICANT CHANGE UP (ref 0–0.5)
EOSINOPHIL NFR BLD AUTO: 0.9 % — SIGNIFICANT CHANGE UP (ref 0–6)
HCT VFR BLD CALC: 29.8 % — LOW (ref 34.5–45)
HGB BLD-MCNC: 10.2 G/DL — LOW (ref 11.5–15.5)
IMM GRANULOCYTES NFR BLD AUTO: 0.7 % — SIGNIFICANT CHANGE UP (ref 0–0.9)
LYMPHOCYTES # BLD AUTO: 1.19 K/UL — SIGNIFICANT CHANGE UP (ref 1–3.3)
LYMPHOCYTES # BLD AUTO: 15.9 % — SIGNIFICANT CHANGE UP (ref 13–44)
MCHC RBC-ENTMCNC: 30.6 PG — SIGNIFICANT CHANGE UP (ref 27–34)
MCHC RBC-ENTMCNC: 34.2 G/DL — SIGNIFICANT CHANGE UP (ref 32–36)
MCV RBC AUTO: 89.5 FL — SIGNIFICANT CHANGE UP (ref 80–100)
MONOCYTES # BLD AUTO: 0.59 K/UL — SIGNIFICANT CHANGE UP (ref 0–0.9)
MONOCYTES NFR BLD AUTO: 7.9 % — SIGNIFICANT CHANGE UP (ref 2–14)
NEUTROPHILS # BLD AUTO: 5.55 K/UL — SIGNIFICANT CHANGE UP (ref 1.8–7.4)
NEUTROPHILS NFR BLD AUTO: 73.9 % — SIGNIFICANT CHANGE UP (ref 43–77)
NRBC BLD AUTO-RTO: 0 /100 WBCS — SIGNIFICANT CHANGE UP (ref 0–0)
PLATELET # BLD AUTO: 323 K/UL — SIGNIFICANT CHANGE UP (ref 150–400)
RBC # BLD: 3.33 M/UL — LOW (ref 3.8–5.2)
RBC # FLD: 20 % — HIGH (ref 10.3–14.5)
WBC # BLD: 7.5 K/UL — SIGNIFICANT CHANGE UP (ref 3.8–10.5)
WBC # FLD AUTO: 7.5 K/UL — SIGNIFICANT CHANGE UP (ref 3.8–10.5)

## 2025-03-26 PROCEDURE — 99213 OFFICE O/P EST LOW 20 MIN: CPT

## 2025-03-26 PROCEDURE — G2211 COMPLEX E/M VISIT ADD ON: CPT

## 2025-03-26 RX ORDER — ALBUTEROL SULFATE 90 UG/1
108 (90 BASE) INHALANT RESPIRATORY (INHALATION)
Qty: 8 | Refills: 0 | Status: ACTIVE | COMMUNITY
Start: 2024-09-30

## 2025-03-26 RX ORDER — SEMAGLUTIDE 1.34 MG/ML
4 INJECTION, SOLUTION SUBCUTANEOUS
Qty: 9 | Refills: 0 | Status: ACTIVE | COMMUNITY
Start: 2025-03-05

## 2025-03-26 RX ORDER — EZETIMIBE 10 MG/1
10 TABLET ORAL
Qty: 90 | Refills: 0 | Status: ACTIVE | COMMUNITY
Start: 2025-01-25

## 2025-03-26 RX ORDER — METFORMIN ER 500 MG 500 MG/1
500 TABLET ORAL
Qty: 60 | Refills: 0 | Status: DISCONTINUED | COMMUNITY
Start: 2024-12-04 | End: 2025-03-26

## 2025-03-27 ENCOUNTER — APPOINTMENT (OUTPATIENT)
Dept: ULTRASOUND IMAGING | Facility: CLINIC | Age: 70
End: 2025-03-27
Payer: MEDICARE

## 2025-03-27 PROCEDURE — 93971 EXTREMITY STUDY: CPT | Mod: RT

## 2025-04-01 ENCOUNTER — NON-APPOINTMENT (OUTPATIENT)
Age: 70
End: 2025-04-01

## 2025-04-01 ENCOUNTER — APPOINTMENT (OUTPATIENT)
Dept: RADIATION ONCOLOGY | Facility: CLINIC | Age: 70
End: 2025-04-01
Payer: MEDICARE

## 2025-04-01 VITALS
SYSTOLIC BLOOD PRESSURE: 113 MMHG | HEIGHT: 63 IN | HEART RATE: 98 BPM | RESPIRATION RATE: 16 BRPM | WEIGHT: 217 LBS | BODY MASS INDEX: 38.45 KG/M2 | DIASTOLIC BLOOD PRESSURE: 73 MMHG | OXYGEN SATURATION: 98 %

## 2025-04-01 DIAGNOSIS — Z91.89 OTHER SPECIFIED PERSONAL RISK FACTORS, NOT ELSEWHERE CLASSIFIED: ICD-10-CM

## 2025-04-01 PROCEDURE — 99205 OFFICE O/P NEW HI 60 MIN: CPT

## 2025-04-01 PROCEDURE — G2211 COMPLEX E/M VISIT ADD ON: CPT

## 2025-04-02 ENCOUNTER — APPOINTMENT (OUTPATIENT)
Dept: INFUSION THERAPY | Facility: CLINIC | Age: 70
End: 2025-04-02

## 2025-04-02 ENCOUNTER — RESULT REVIEW (OUTPATIENT)
Age: 70
End: 2025-04-02

## 2025-04-02 VITALS
OXYGEN SATURATION: 96 % | BODY MASS INDEX: 38.09 KG/M2 | TEMPERATURE: 98 F | HEIGHT: 63 IN | HEART RATE: 104 BPM | WEIGHT: 215 LBS | SYSTOLIC BLOOD PRESSURE: 102 MMHG | DIASTOLIC BLOOD PRESSURE: 69 MMHG

## 2025-04-02 LAB
ALBUMIN SERPL ELPH-MCNC: 3.7 G/DL — SIGNIFICANT CHANGE UP (ref 3.3–5)
ALP SERPL-CCNC: 72 U/L — SIGNIFICANT CHANGE UP (ref 40–120)
ALT FLD-CCNC: 82 U/L — HIGH (ref 10–45)
ANION GAP SERPL CALC-SCNC: 8 MMOL/L — SIGNIFICANT CHANGE UP (ref 5–17)
AST SERPL-CCNC: 44 U/L — HIGH (ref 10–40)
BASOPHILS # BLD AUTO: 0.05 K/UL — SIGNIFICANT CHANGE UP (ref 0–0.2)
BASOPHILS NFR BLD AUTO: 0.7 % — SIGNIFICANT CHANGE UP (ref 0–2)
BILIRUB SERPL-MCNC: 0.2 MG/DL — SIGNIFICANT CHANGE UP (ref 0.2–1.2)
BUN SERPL-MCNC: 11 MG/DL — SIGNIFICANT CHANGE UP (ref 7–23)
CALCIUM SERPL-MCNC: 8.9 MG/DL — SIGNIFICANT CHANGE UP (ref 8.4–10.5)
CHLORIDE SERPL-SCNC: 102 MMOL/L — SIGNIFICANT CHANGE UP (ref 96–108)
CO2 SERPL-SCNC: 29 MMOL/L — SIGNIFICANT CHANGE UP (ref 22–31)
CREAT SERPL-MCNC: 0.39 MG/DL — LOW (ref 0.5–1.3)
EGFR: 106 ML/MIN/1.73M2 — SIGNIFICANT CHANGE UP
EGFR: 106 ML/MIN/1.73M2 — SIGNIFICANT CHANGE UP
EOSINOPHIL # BLD AUTO: 0.12 K/UL — SIGNIFICANT CHANGE UP (ref 0–0.5)
EOSINOPHIL NFR BLD AUTO: 1.6 % — SIGNIFICANT CHANGE UP (ref 0–6)
GLUCOSE SERPL-MCNC: 131 MG/DL — HIGH (ref 70–99)
HCT VFR BLD CALC: 31.1 % — LOW (ref 34.5–45)
HGB BLD-MCNC: 10.6 G/DL — LOW (ref 11.5–15.5)
IMM GRANULOCYTES NFR BLD AUTO: 0.5 % — SIGNIFICANT CHANGE UP (ref 0–0.9)
LYMPHOCYTES # BLD AUTO: 1.03 K/UL — SIGNIFICANT CHANGE UP (ref 1–3.3)
LYMPHOCYTES # BLD AUTO: 14 % — SIGNIFICANT CHANGE UP (ref 13–44)
MCHC RBC-ENTMCNC: 31.3 PG — SIGNIFICANT CHANGE UP (ref 27–34)
MCHC RBC-ENTMCNC: 34.1 G/DL — SIGNIFICANT CHANGE UP (ref 32–36)
MCV RBC AUTO: 91.7 FL — SIGNIFICANT CHANGE UP (ref 80–100)
MONOCYTES # BLD AUTO: 0.42 K/UL — SIGNIFICANT CHANGE UP (ref 0–0.9)
MONOCYTES NFR BLD AUTO: 5.7 % — SIGNIFICANT CHANGE UP (ref 2–14)
NEUTROPHILS # BLD AUTO: 5.68 K/UL — SIGNIFICANT CHANGE UP (ref 1.8–7.4)
NEUTROPHILS NFR BLD AUTO: 77.5 % — HIGH (ref 43–77)
NRBC BLD AUTO-RTO: 0 /100 WBCS — SIGNIFICANT CHANGE UP (ref 0–0)
PLATELET # BLD AUTO: 282 K/UL — SIGNIFICANT CHANGE UP (ref 150–400)
POTASSIUM SERPL-MCNC: 4 MMOL/L — SIGNIFICANT CHANGE UP (ref 3.5–5.3)
POTASSIUM SERPL-SCNC: 4 MMOL/L — SIGNIFICANT CHANGE UP (ref 3.5–5.3)
PROT SERPL-MCNC: 5.7 G/DL — LOW (ref 6–8.3)
RBC # BLD: 3.39 M/UL — LOW (ref 3.8–5.2)
RBC # FLD: 19.9 % — HIGH (ref 10.3–14.5)
SODIUM SERPL-SCNC: 139 MMOL/L — SIGNIFICANT CHANGE UP (ref 135–145)
WBC # BLD: 7.34 K/UL — SIGNIFICANT CHANGE UP (ref 3.8–10.5)
WBC # FLD AUTO: 7.34 K/UL — SIGNIFICANT CHANGE UP (ref 3.8–10.5)

## 2025-04-09 ENCOUNTER — RESULT REVIEW (OUTPATIENT)
Age: 70
End: 2025-04-09

## 2025-04-09 ENCOUNTER — APPOINTMENT (OUTPATIENT)
Dept: INFUSION THERAPY | Facility: CLINIC | Age: 70
End: 2025-04-09

## 2025-04-09 VITALS
HEIGHT: 63 IN | TEMPERATURE: 98.1 F | DIASTOLIC BLOOD PRESSURE: 72 MMHG | HEART RATE: 89 BPM | WEIGHT: 217 LBS | BODY MASS INDEX: 38.45 KG/M2 | OXYGEN SATURATION: 97 % | SYSTOLIC BLOOD PRESSURE: 113 MMHG

## 2025-04-09 LAB
BASOPHILS # BLD AUTO: 0.02 K/UL — SIGNIFICANT CHANGE UP (ref 0–0.2)
BASOPHILS NFR BLD AUTO: 0.4 % — SIGNIFICANT CHANGE UP (ref 0–2)
EOSINOPHIL # BLD AUTO: 0.05 K/UL — SIGNIFICANT CHANGE UP (ref 0–0.5)
EOSINOPHIL NFR BLD AUTO: 0.9 % — SIGNIFICANT CHANGE UP (ref 0–6)
HCT VFR BLD CALC: 31.1 % — LOW (ref 34.5–45)
HGB BLD-MCNC: 10.3 G/DL — LOW (ref 11.5–15.5)
IMM GRANULOCYTES NFR BLD AUTO: 0.7 % — SIGNIFICANT CHANGE UP (ref 0–0.9)
LYMPHOCYTES # BLD AUTO: 0.98 K/UL — LOW (ref 1–3.3)
LYMPHOCYTES # BLD AUTO: 17.6 % — SIGNIFICANT CHANGE UP (ref 13–44)
MCHC RBC-ENTMCNC: 31 PG — SIGNIFICANT CHANGE UP (ref 27–34)
MCHC RBC-ENTMCNC: 33.1 G/DL — SIGNIFICANT CHANGE UP (ref 32–36)
MCV RBC AUTO: 93.7 FL — SIGNIFICANT CHANGE UP (ref 80–100)
MONOCYTES # BLD AUTO: 0.29 K/UL — SIGNIFICANT CHANGE UP (ref 0–0.9)
MONOCYTES NFR BLD AUTO: 5.2 % — SIGNIFICANT CHANGE UP (ref 2–14)
NEUTROPHILS # BLD AUTO: 4.18 K/UL — SIGNIFICANT CHANGE UP (ref 1.8–7.4)
NEUTROPHILS NFR BLD AUTO: 75.2 % — SIGNIFICANT CHANGE UP (ref 43–77)
NRBC BLD AUTO-RTO: 0 /100 WBCS — SIGNIFICANT CHANGE UP (ref 0–0)
PLATELET # BLD AUTO: 252 K/UL — SIGNIFICANT CHANGE UP (ref 150–400)
RBC # BLD: 3.32 M/UL — LOW (ref 3.8–5.2)
RBC # FLD: 19.3 % — HIGH (ref 10.3–14.5)
WBC # BLD: 5.56 K/UL — SIGNIFICANT CHANGE UP (ref 3.8–10.5)
WBC # FLD AUTO: 5.56 K/UL — SIGNIFICANT CHANGE UP (ref 3.8–10.5)

## 2025-04-10 ENCOUNTER — OUTPATIENT (OUTPATIENT)
Dept: OUTPATIENT SERVICES | Facility: HOSPITAL | Age: 70
LOS: 1 days | Discharge: HOME | End: 2025-04-10

## 2025-04-10 DIAGNOSIS — Z98.890 OTHER SPECIFIED POSTPROCEDURAL STATES: Chronic | ICD-10-CM

## 2025-04-10 DIAGNOSIS — C50.912 MALIGNANT NEOPLASM OF UNSPECIFIED SITE OF LEFT FEMALE BREAST: ICD-10-CM

## 2025-04-10 DIAGNOSIS — Z95.2 PRESENCE OF PROSTHETIC HEART VALVE: Chronic | ICD-10-CM

## 2025-04-16 ENCOUNTER — RESULT REVIEW (OUTPATIENT)
Age: 70
End: 2025-04-16

## 2025-04-16 ENCOUNTER — APPOINTMENT (OUTPATIENT)
Dept: INFUSION THERAPY | Facility: CLINIC | Age: 70
End: 2025-04-16

## 2025-04-16 VITALS
HEART RATE: 99 BPM | BODY MASS INDEX: 38.45 KG/M2 | OXYGEN SATURATION: 97 % | HEIGHT: 63 IN | TEMPERATURE: 98 F | WEIGHT: 217 LBS | DIASTOLIC BLOOD PRESSURE: 80 MMHG | SYSTOLIC BLOOD PRESSURE: 120 MMHG

## 2025-04-16 DIAGNOSIS — Z51.11 ENCOUNTER FOR ANTINEOPLASTIC CHEMOTHERAPY: ICD-10-CM

## 2025-04-16 DIAGNOSIS — R11.2 NAUSEA WITH VOMITING, UNSPECIFIED: ICD-10-CM

## 2025-04-16 LAB
BASOPHILS # BLD AUTO: 0.04 K/UL — SIGNIFICANT CHANGE UP (ref 0–0.2)
BASOPHILS NFR BLD AUTO: 0.6 % — SIGNIFICANT CHANGE UP (ref 0–2)
EOSINOPHIL # BLD AUTO: 0.07 K/UL — SIGNIFICANT CHANGE UP (ref 0–0.5)
EOSINOPHIL NFR BLD AUTO: 1.1 % — SIGNIFICANT CHANGE UP (ref 0–6)
HCT VFR BLD CALC: 32.2 % — LOW (ref 34.5–45)
HGB BLD-MCNC: 10.7 G/DL — LOW (ref 11.5–15.5)
IMM GRANULOCYTES NFR BLD AUTO: 0.8 % — SIGNIFICANT CHANGE UP (ref 0–0.9)
LYMPHOCYTES # BLD AUTO: 1.18 K/UL — SIGNIFICANT CHANGE UP (ref 1–3.3)
LYMPHOCYTES # BLD AUTO: 18.8 % — SIGNIFICANT CHANGE UP (ref 13–44)
MCHC RBC-ENTMCNC: 31.5 PG — SIGNIFICANT CHANGE UP (ref 27–34)
MCHC RBC-ENTMCNC: 33.2 G/DL — SIGNIFICANT CHANGE UP (ref 32–36)
MCV RBC AUTO: 94.7 FL — SIGNIFICANT CHANGE UP (ref 80–100)
MONOCYTES # BLD AUTO: 0.41 K/UL — SIGNIFICANT CHANGE UP (ref 0–0.9)
MONOCYTES NFR BLD AUTO: 6.5 % — SIGNIFICANT CHANGE UP (ref 2–14)
NEUTROPHILS # BLD AUTO: 4.53 K/UL — SIGNIFICANT CHANGE UP (ref 1.8–7.4)
NEUTROPHILS NFR BLD AUTO: 72.2 % — SIGNIFICANT CHANGE UP (ref 43–77)
NRBC BLD AUTO-RTO: 0 /100 WBCS — SIGNIFICANT CHANGE UP (ref 0–0)
PLATELET # BLD AUTO: 263 K/UL — SIGNIFICANT CHANGE UP (ref 150–400)
RBC # BLD: 3.4 M/UL — LOW (ref 3.8–5.2)
RBC # FLD: 18.5 % — HIGH (ref 10.3–14.5)
WBC # BLD: 6.28 K/UL — SIGNIFICANT CHANGE UP (ref 3.8–10.5)
WBC # FLD AUTO: 6.28 K/UL — SIGNIFICANT CHANGE UP (ref 3.8–10.5)

## 2025-04-23 ENCOUNTER — RESULT REVIEW (OUTPATIENT)
Age: 70
End: 2025-04-23

## 2025-04-23 ENCOUNTER — APPOINTMENT (OUTPATIENT)
Dept: INFUSION THERAPY | Facility: CLINIC | Age: 70
End: 2025-04-23

## 2025-04-23 VITALS
SYSTOLIC BLOOD PRESSURE: 92 MMHG | HEIGHT: 63 IN | BODY MASS INDEX: 38.62 KG/M2 | TEMPERATURE: 97.7 F | WEIGHT: 218 LBS | DIASTOLIC BLOOD PRESSURE: 56 MMHG | OXYGEN SATURATION: 95 % | HEART RATE: 99 BPM

## 2025-04-23 LAB
BASOPHILS # BLD AUTO: 0.03 K/UL — SIGNIFICANT CHANGE UP (ref 0–0.2)
BASOPHILS NFR BLD AUTO: 0.5 % — SIGNIFICANT CHANGE UP (ref 0–2)
EOSINOPHIL # BLD AUTO: 0.05 K/UL — SIGNIFICANT CHANGE UP (ref 0–0.5)
EOSINOPHIL NFR BLD AUTO: 0.8 % — SIGNIFICANT CHANGE UP (ref 0–6)
HCT VFR BLD CALC: 32.1 % — LOW (ref 34.5–45)
HGB BLD-MCNC: 10.6 G/DL — LOW (ref 11.5–15.5)
IMM GRANULOCYTES NFR BLD AUTO: 0.5 % — SIGNIFICANT CHANGE UP (ref 0–0.9)
LYMPHOCYTES # BLD AUTO: 1.34 K/UL — SIGNIFICANT CHANGE UP (ref 1–3.3)
LYMPHOCYTES # BLD AUTO: 21.5 % — SIGNIFICANT CHANGE UP (ref 13–44)
MCHC RBC-ENTMCNC: 31.8 PG — SIGNIFICANT CHANGE UP (ref 27–34)
MCHC RBC-ENTMCNC: 33 G/DL — SIGNIFICANT CHANGE UP (ref 32–36)
MCV RBC AUTO: 96.4 FL — SIGNIFICANT CHANGE UP (ref 80–100)
MONOCYTES # BLD AUTO: 0.44 K/UL — SIGNIFICANT CHANGE UP (ref 0–0.9)
MONOCYTES NFR BLD AUTO: 7.1 % — SIGNIFICANT CHANGE UP (ref 2–14)
NEUTROPHILS # BLD AUTO: 4.33 K/UL — SIGNIFICANT CHANGE UP (ref 1.8–7.4)
NEUTROPHILS NFR BLD AUTO: 69.6 % — SIGNIFICANT CHANGE UP (ref 43–77)
NRBC BLD AUTO-RTO: 0 /100 WBCS — SIGNIFICANT CHANGE UP (ref 0–0)
PLATELET # BLD AUTO: 283 K/UL — SIGNIFICANT CHANGE UP (ref 150–400)
RBC # BLD: 3.33 M/UL — LOW (ref 3.8–5.2)
RBC # FLD: 18 % — HIGH (ref 10.3–14.5)
WBC # BLD: 6.22 K/UL — SIGNIFICANT CHANGE UP (ref 3.8–10.5)
WBC # FLD AUTO: 6.22 K/UL — SIGNIFICANT CHANGE UP (ref 3.8–10.5)

## 2025-04-30 ENCOUNTER — APPOINTMENT (OUTPATIENT)
Dept: INFUSION THERAPY | Facility: CLINIC | Age: 70
End: 2025-04-30
Payer: MEDICARE

## 2025-04-30 ENCOUNTER — RESULT REVIEW (OUTPATIENT)
Age: 70
End: 2025-04-30

## 2025-04-30 ENCOUNTER — APPOINTMENT (OUTPATIENT)
Dept: HEMATOLOGY ONCOLOGY | Facility: CLINIC | Age: 70
End: 2025-04-30
Payer: MEDICARE

## 2025-04-30 VITALS
HEART RATE: 101 BPM | BODY MASS INDEX: 38.45 KG/M2 | OXYGEN SATURATION: 93 % | SYSTOLIC BLOOD PRESSURE: 114 MMHG | TEMPERATURE: 97.6 F | HEIGHT: 63 IN | WEIGHT: 217 LBS | DIASTOLIC BLOOD PRESSURE: 76 MMHG

## 2025-04-30 DIAGNOSIS — C77.3 MALIGNANT NEOPLASM OF UNSPECIFIED SITE OF LEFT FEMALE BREAST: ICD-10-CM

## 2025-04-30 DIAGNOSIS — Z79.69 LONG TERM (CURRENT) USE OF OTHER IMMUNOMODULATORS AND IMMUNOSUPPRESSANTS: ICD-10-CM

## 2025-04-30 DIAGNOSIS — C50.912 MALIGNANT NEOPLASM OF UNSPECIFIED SITE OF LEFT FEMALE BREAST: ICD-10-CM

## 2025-04-30 LAB
ALBUMIN SERPL ELPH-MCNC: 3.8 G/DL — SIGNIFICANT CHANGE UP (ref 3.3–5)
ALP SERPL-CCNC: 77 U/L — SIGNIFICANT CHANGE UP (ref 40–120)
ALT FLD-CCNC: 46 U/L — HIGH (ref 10–40)
ANION GAP SERPL CALC-SCNC: 12 MMOL/L — SIGNIFICANT CHANGE UP (ref 5–17)
AST SERPL-CCNC: 31 U/L — SIGNIFICANT CHANGE UP (ref 10–35)
BASOPHILS # BLD AUTO: 0.02 K/UL — SIGNIFICANT CHANGE UP (ref 0–0.2)
BASOPHILS NFR BLD AUTO: 0.3 % — SIGNIFICANT CHANGE UP (ref 0–2)
BILIRUB SERPL-MCNC: 0.2 MG/DL — SIGNIFICANT CHANGE UP (ref 0.2–1.2)
BUN SERPL-MCNC: 14 MG/DL — SIGNIFICANT CHANGE UP (ref 7–23)
CALCIUM SERPL-MCNC: 9 MG/DL — SIGNIFICANT CHANGE UP (ref 8.4–10.5)
CHLORIDE SERPL-SCNC: 100 MMOL/L — SIGNIFICANT CHANGE UP (ref 96–108)
CO2 SERPL-SCNC: 25 MMOL/L — SIGNIFICANT CHANGE UP (ref 22–31)
CREAT SERPL-MCNC: 0.51 MG/DL — SIGNIFICANT CHANGE UP (ref 0.5–1.3)
EGFR: 100 ML/MIN/1.73M2 — SIGNIFICANT CHANGE UP
EGFR: 100 ML/MIN/1.73M2 — SIGNIFICANT CHANGE UP
EOSINOPHIL # BLD AUTO: 0.03 K/UL — SIGNIFICANT CHANGE UP (ref 0–0.5)
EOSINOPHIL NFR BLD AUTO: 0.5 % — SIGNIFICANT CHANGE UP (ref 0–6)
GLUCOSE SERPL-MCNC: 123 MG/DL — HIGH (ref 70–99)
HCT VFR BLD CALC: 32.4 % — LOW (ref 34.5–45)
HGB BLD-MCNC: 10.7 G/DL — LOW (ref 11.5–15.5)
IMM GRANULOCYTES NFR BLD AUTO: 0.5 % — SIGNIFICANT CHANGE UP (ref 0–0.9)
LYMPHOCYTES # BLD AUTO: 1.26 K/UL — SIGNIFICANT CHANGE UP (ref 1–3.3)
LYMPHOCYTES # BLD AUTO: 20.6 % — SIGNIFICANT CHANGE UP (ref 13–44)
MCHC RBC-ENTMCNC: 32.3 PG — SIGNIFICANT CHANGE UP (ref 27–34)
MCHC RBC-ENTMCNC: 33 G/DL — SIGNIFICANT CHANGE UP (ref 32–36)
MCV RBC AUTO: 97.9 FL — SIGNIFICANT CHANGE UP (ref 80–100)
MONOCYTES # BLD AUTO: 0.46 K/UL — SIGNIFICANT CHANGE UP (ref 0–0.9)
MONOCYTES NFR BLD AUTO: 7.5 % — SIGNIFICANT CHANGE UP (ref 2–14)
NEUTROPHILS # BLD AUTO: 4.31 K/UL — SIGNIFICANT CHANGE UP (ref 1.8–7.4)
NEUTROPHILS NFR BLD AUTO: 70.6 % — SIGNIFICANT CHANGE UP (ref 43–77)
NRBC BLD AUTO-RTO: 0 /100 WBCS — SIGNIFICANT CHANGE UP (ref 0–0)
PLATELET # BLD AUTO: 295 K/UL — SIGNIFICANT CHANGE UP (ref 150–400)
POTASSIUM SERPL-MCNC: 4 MMOL/L — SIGNIFICANT CHANGE UP (ref 3.5–5.3)
POTASSIUM SERPL-SCNC: 4 MMOL/L — SIGNIFICANT CHANGE UP (ref 3.5–5.3)
PROT SERPL-MCNC: 5.7 G/DL — LOW (ref 6–8.3)
RBC # BLD: 3.31 M/UL — LOW (ref 3.8–5.2)
RBC # FLD: 16.8 % — HIGH (ref 10.3–14.5)
SODIUM SERPL-SCNC: 137 MMOL/L — SIGNIFICANT CHANGE UP (ref 135–145)
WBC # BLD: 6.11 K/UL — SIGNIFICANT CHANGE UP (ref 3.8–10.5)
WBC # FLD AUTO: 6.11 K/UL — SIGNIFICANT CHANGE UP (ref 3.8–10.5)

## 2025-04-30 PROCEDURE — 99215 OFFICE O/P EST HI 40 MIN: CPT

## 2025-05-05 ENCOUNTER — APPOINTMENT (OUTPATIENT)
Dept: PHYSICAL MEDICINE AND REHAB | Facility: CLINIC | Age: 70
End: 2025-05-05
Payer: MEDICARE

## 2025-05-05 VITALS — BODY MASS INDEX: 40.22 KG/M2 | HEIGHT: 63 IN | WEIGHT: 227 LBS

## 2025-05-05 DIAGNOSIS — G89.28 OTHER CHRONIC POSTPROCEDURAL PAIN: ICD-10-CM

## 2025-05-05 DIAGNOSIS — I89.0 LYMPHEDEMA, NOT ELSEWHERE CLASSIFIED: ICD-10-CM

## 2025-05-05 PROCEDURE — 99214 OFFICE O/P EST MOD 30 MIN: CPT

## 2025-05-07 ENCOUNTER — RESULT REVIEW (OUTPATIENT)
Age: 70
End: 2025-05-07

## 2025-05-07 ENCOUNTER — APPOINTMENT (OUTPATIENT)
Dept: INFUSION THERAPY | Facility: CLINIC | Age: 70
End: 2025-05-07

## 2025-05-07 VITALS
DIASTOLIC BLOOD PRESSURE: 69 MMHG | HEIGHT: 63 IN | WEIGHT: 215 LBS | OXYGEN SATURATION: 95 % | HEART RATE: 92 BPM | BODY MASS INDEX: 38.09 KG/M2 | TEMPERATURE: 97.9 F | SYSTOLIC BLOOD PRESSURE: 101 MMHG

## 2025-05-07 LAB
BASOPHILS # BLD AUTO: 0.03 K/UL — SIGNIFICANT CHANGE UP (ref 0–0.2)
BASOPHILS NFR BLD AUTO: 0.4 % — SIGNIFICANT CHANGE UP (ref 0–2)
EOSINOPHIL # BLD AUTO: 0.03 K/UL — SIGNIFICANT CHANGE UP (ref 0–0.5)
EOSINOPHIL NFR BLD AUTO: 0.4 % — SIGNIFICANT CHANGE UP (ref 0–6)
HCT VFR BLD CALC: 31.3 % — LOW (ref 34.5–45)
HGB BLD-MCNC: 10.5 G/DL — LOW (ref 11.5–15.5)
IMM GRANULOCYTES NFR BLD AUTO: 0.4 % — SIGNIFICANT CHANGE UP (ref 0–0.9)
LYMPHOCYTES # BLD AUTO: 1.14 K/UL — SIGNIFICANT CHANGE UP (ref 1–3.3)
LYMPHOCYTES # BLD AUTO: 16.7 % — SIGNIFICANT CHANGE UP (ref 13–44)
MCHC RBC-ENTMCNC: 32.4 PG — SIGNIFICANT CHANGE UP (ref 27–34)
MCHC RBC-ENTMCNC: 33.5 G/DL — SIGNIFICANT CHANGE UP (ref 32–36)
MCV RBC AUTO: 96.6 FL — SIGNIFICANT CHANGE UP (ref 80–100)
MONOCYTES # BLD AUTO: 0.47 K/UL — SIGNIFICANT CHANGE UP (ref 0–0.9)
MONOCYTES NFR BLD AUTO: 6.9 % — SIGNIFICANT CHANGE UP (ref 2–14)
NEUTROPHILS # BLD AUTO: 5.12 K/UL — SIGNIFICANT CHANGE UP (ref 1.8–7.4)
NEUTROPHILS NFR BLD AUTO: 75.2 % — SIGNIFICANT CHANGE UP (ref 43–77)
NRBC BLD AUTO-RTO: 0 /100 WBCS — SIGNIFICANT CHANGE UP (ref 0–0)
PLATELET # BLD AUTO: 275 K/UL — SIGNIFICANT CHANGE UP (ref 150–400)
RBC # BLD: 3.24 M/UL — LOW (ref 3.8–5.2)
RBC # FLD: 15.8 % — HIGH (ref 10.3–14.5)
WBC # BLD: 6.82 K/UL — SIGNIFICANT CHANGE UP (ref 3.8–10.5)
WBC # FLD AUTO: 6.82 K/UL — SIGNIFICANT CHANGE UP (ref 3.8–10.5)

## 2025-05-14 ENCOUNTER — APPOINTMENT (OUTPATIENT)
Dept: INFUSION THERAPY | Facility: CLINIC | Age: 70
End: 2025-05-14

## 2025-05-14 ENCOUNTER — RESULT REVIEW (OUTPATIENT)
Age: 70
End: 2025-05-14

## 2025-05-14 VITALS
DIASTOLIC BLOOD PRESSURE: 74 MMHG | OXYGEN SATURATION: 94 % | WEIGHT: 219 LBS | TEMPERATURE: 97.6 F | HEIGHT: 63 IN | HEART RATE: 99 BPM | SYSTOLIC BLOOD PRESSURE: 113 MMHG | BODY MASS INDEX: 38.8 KG/M2

## 2025-05-14 LAB
BASOPHILS # BLD AUTO: 0.03 K/UL — SIGNIFICANT CHANGE UP (ref 0–0.2)
BASOPHILS NFR BLD AUTO: 0.5 % — SIGNIFICANT CHANGE UP (ref 0–2)
EOSINOPHIL # BLD AUTO: 0.05 K/UL — SIGNIFICANT CHANGE UP (ref 0–0.5)
EOSINOPHIL NFR BLD AUTO: 0.9 % — SIGNIFICANT CHANGE UP (ref 0–6)
HCT VFR BLD CALC: 30.7 % — LOW (ref 34.5–45)
HGB BLD-MCNC: 10.4 G/DL — LOW (ref 11.5–15.5)
IMM GRANULOCYTES NFR BLD AUTO: 0.4 % — SIGNIFICANT CHANGE UP (ref 0–0.9)
LYMPHOCYTES # BLD AUTO: 1.21 K/UL — SIGNIFICANT CHANGE UP (ref 1–3.3)
LYMPHOCYTES # BLD AUTO: 21.6 % — SIGNIFICANT CHANGE UP (ref 13–44)
MCHC RBC-ENTMCNC: 32.7 PG — SIGNIFICANT CHANGE UP (ref 27–34)
MCHC RBC-ENTMCNC: 33.9 G/DL — SIGNIFICANT CHANGE UP (ref 32–36)
MCV RBC AUTO: 96.5 FL — SIGNIFICANT CHANGE UP (ref 80–100)
MONOCYTES # BLD AUTO: 0.43 K/UL — SIGNIFICANT CHANGE UP (ref 0–0.9)
MONOCYTES NFR BLD AUTO: 7.7 % — SIGNIFICANT CHANGE UP (ref 2–14)
NEUTROPHILS # BLD AUTO: 3.87 K/UL — SIGNIFICANT CHANGE UP (ref 1.8–7.4)
NEUTROPHILS NFR BLD AUTO: 68.9 % — SIGNIFICANT CHANGE UP (ref 43–77)
NRBC BLD AUTO-RTO: 0 /100 WBCS — SIGNIFICANT CHANGE UP (ref 0–0)
PLATELET # BLD AUTO: 307 K/UL — SIGNIFICANT CHANGE UP (ref 150–400)
RBC # BLD: 3.18 M/UL — LOW (ref 3.8–5.2)
RBC # FLD: 14.8 % — HIGH (ref 10.3–14.5)
WBC # BLD: 5.61 K/UL — SIGNIFICANT CHANGE UP (ref 3.8–10.5)
WBC # FLD AUTO: 5.61 K/UL — SIGNIFICANT CHANGE UP (ref 3.8–10.5)

## 2025-05-21 ENCOUNTER — APPOINTMENT (OUTPATIENT)
Dept: INFUSION THERAPY | Facility: CLINIC | Age: 70
End: 2025-05-21

## 2025-05-21 ENCOUNTER — RESULT REVIEW (OUTPATIENT)
Age: 70
End: 2025-05-21

## 2025-05-21 VITALS
HEART RATE: 103 BPM | SYSTOLIC BLOOD PRESSURE: 129 MMHG | WEIGHT: 216 LBS | TEMPERATURE: 97.1 F | DIASTOLIC BLOOD PRESSURE: 80 MMHG | BODY MASS INDEX: 38.26 KG/M2 | OXYGEN SATURATION: 95 %

## 2025-05-21 LAB
BASOPHILS # BLD AUTO: 0.02 K/UL — SIGNIFICANT CHANGE UP (ref 0–0.2)
BASOPHILS NFR BLD AUTO: 0.3 % — SIGNIFICANT CHANGE UP (ref 0–2)
EOSINOPHIL # BLD AUTO: 0.03 K/UL — SIGNIFICANT CHANGE UP (ref 0–0.5)
EOSINOPHIL NFR BLD AUTO: 0.5 % — SIGNIFICANT CHANGE UP (ref 0–6)
HCT VFR BLD CALC: 32.8 % — LOW (ref 34.5–45)
HGB BLD-MCNC: 11 G/DL — LOW (ref 11.5–15.5)
IMM GRANULOCYTES NFR BLD AUTO: 0.8 % — SIGNIFICANT CHANGE UP (ref 0–0.9)
LYMPHOCYTES # BLD AUTO: 1.35 K/UL — SIGNIFICANT CHANGE UP (ref 1–3.3)
LYMPHOCYTES # BLD AUTO: 20.8 % — SIGNIFICANT CHANGE UP (ref 13–44)
MCHC RBC-ENTMCNC: 32 PG — SIGNIFICANT CHANGE UP (ref 27–34)
MCHC RBC-ENTMCNC: 33.5 G/DL — SIGNIFICANT CHANGE UP (ref 32–36)
MCV RBC AUTO: 95.3 FL — SIGNIFICANT CHANGE UP (ref 80–100)
MONOCYTES # BLD AUTO: 0.52 K/UL — SIGNIFICANT CHANGE UP (ref 0–0.9)
MONOCYTES NFR BLD AUTO: 8 % — SIGNIFICANT CHANGE UP (ref 2–14)
NEUTROPHILS # BLD AUTO: 4.53 K/UL — SIGNIFICANT CHANGE UP (ref 1.8–7.4)
NEUTROPHILS NFR BLD AUTO: 69.6 % — SIGNIFICANT CHANGE UP (ref 43–77)
NRBC BLD AUTO-RTO: 0 /100 WBCS — SIGNIFICANT CHANGE UP (ref 0–0)
PLATELET # BLD AUTO: 340 K/UL — SIGNIFICANT CHANGE UP (ref 150–400)
RBC # BLD: 3.44 M/UL — LOW (ref 3.8–5.2)
RBC # FLD: 14.7 % — HIGH (ref 10.3–14.5)
WBC # BLD: 6.5 K/UL — SIGNIFICANT CHANGE UP (ref 3.8–10.5)
WBC # FLD AUTO: 6.5 K/UL — SIGNIFICANT CHANGE UP (ref 3.8–10.5)

## 2025-05-28 ENCOUNTER — RESULT REVIEW (OUTPATIENT)
Age: 70
End: 2025-05-28

## 2025-05-28 ENCOUNTER — APPOINTMENT (OUTPATIENT)
Dept: HEMATOLOGY ONCOLOGY | Facility: CLINIC | Age: 70
End: 2025-05-28
Payer: MEDICARE

## 2025-05-28 ENCOUNTER — APPOINTMENT (OUTPATIENT)
Dept: INFUSION THERAPY | Facility: CLINIC | Age: 70
End: 2025-05-28
Payer: MEDICARE

## 2025-05-28 VITALS
HEART RATE: 93 BPM | DIASTOLIC BLOOD PRESSURE: 67 MMHG | SYSTOLIC BLOOD PRESSURE: 100 MMHG | TEMPERATURE: 97.7 F | OXYGEN SATURATION: 94 % | BODY MASS INDEX: 38.62 KG/M2 | WEIGHT: 218 LBS

## 2025-05-28 DIAGNOSIS — Z79.69 LONG TERM (CURRENT) USE OF OTHER IMMUNOMODULATORS AND IMMUNOSUPPRESSANTS: ICD-10-CM

## 2025-05-28 LAB
ALBUMIN SERPL ELPH-MCNC: 3.7 G/DL — SIGNIFICANT CHANGE UP (ref 3.3–5)
ALP SERPL-CCNC: 81 U/L — SIGNIFICANT CHANGE UP (ref 40–120)
ALT FLD-CCNC: 31 U/L — SIGNIFICANT CHANGE UP (ref 10–40)
ANION GAP SERPL CALC-SCNC: 12 MMOL/L — SIGNIFICANT CHANGE UP (ref 5–17)
AST SERPL-CCNC: 22 U/L — SIGNIFICANT CHANGE UP (ref 10–35)
BASOPHILS # BLD AUTO: 0.03 K/UL — SIGNIFICANT CHANGE UP (ref 0–0.2)
BASOPHILS NFR BLD AUTO: 0.5 % — SIGNIFICANT CHANGE UP (ref 0–2)
BILIRUB SERPL-MCNC: <0.2 MG/DL — SIGNIFICANT CHANGE UP (ref 0.2–1.2)
BUN SERPL-MCNC: 15 MG/DL — SIGNIFICANT CHANGE UP (ref 7–23)
CALCIUM SERPL-MCNC: 9 MG/DL — SIGNIFICANT CHANGE UP (ref 8.4–10.5)
CHLORIDE SERPL-SCNC: 101 MMOL/L — SIGNIFICANT CHANGE UP (ref 96–108)
CO2 SERPL-SCNC: 24 MMOL/L — SIGNIFICANT CHANGE UP (ref 22–31)
CREAT SERPL-MCNC: 0.54 MG/DL — SIGNIFICANT CHANGE UP (ref 0.5–1.3)
EGFR: 98 ML/MIN/1.73M2 — SIGNIFICANT CHANGE UP
EGFR: 98 ML/MIN/1.73M2 — SIGNIFICANT CHANGE UP
EOSINOPHIL # BLD AUTO: 0.04 K/UL — SIGNIFICANT CHANGE UP (ref 0–0.5)
EOSINOPHIL NFR BLD AUTO: 0.6 % — SIGNIFICANT CHANGE UP (ref 0–6)
GLUCOSE SERPL-MCNC: 127 MG/DL — HIGH (ref 70–99)
HCT VFR BLD CALC: 32.8 % — LOW (ref 34.5–45)
HGB BLD-MCNC: 10.9 G/DL — LOW (ref 11.5–15.5)
IMM GRANULOCYTES NFR BLD AUTO: 0.8 % — SIGNIFICANT CHANGE UP (ref 0–0.9)
LYMPHOCYTES # BLD AUTO: 1.28 K/UL — SIGNIFICANT CHANGE UP (ref 1–3.3)
LYMPHOCYTES # BLD AUTO: 19.8 % — SIGNIFICANT CHANGE UP (ref 13–44)
MCHC RBC-ENTMCNC: 31.8 PG — SIGNIFICANT CHANGE UP (ref 27–34)
MCHC RBC-ENTMCNC: 33.2 G/DL — SIGNIFICANT CHANGE UP (ref 32–36)
MCV RBC AUTO: 95.6 FL — SIGNIFICANT CHANGE UP (ref 80–100)
MONOCYTES # BLD AUTO: 0.45 K/UL — SIGNIFICANT CHANGE UP (ref 0–0.9)
MONOCYTES NFR BLD AUTO: 7 % — SIGNIFICANT CHANGE UP (ref 2–14)
NEUTROPHILS # BLD AUTO: 4.6 K/UL — SIGNIFICANT CHANGE UP (ref 1.8–7.4)
NEUTROPHILS NFR BLD AUTO: 71.3 % — SIGNIFICANT CHANGE UP (ref 43–77)
NRBC BLD AUTO-RTO: 0 /100 WBCS — SIGNIFICANT CHANGE UP (ref 0–0)
PLATELET # BLD AUTO: 304 K/UL — SIGNIFICANT CHANGE UP (ref 150–400)
POTASSIUM SERPL-MCNC: 4.1 MMOL/L — SIGNIFICANT CHANGE UP (ref 3.5–5.3)
POTASSIUM SERPL-SCNC: 4.1 MMOL/L — SIGNIFICANT CHANGE UP (ref 3.5–5.3)
PROT SERPL-MCNC: 5.7 G/DL — LOW (ref 6–8.3)
RBC # BLD: 3.43 M/UL — LOW (ref 3.8–5.2)
RBC # FLD: 14.6 % — HIGH (ref 10.3–14.5)
SODIUM SERPL-SCNC: 137 MMOL/L — SIGNIFICANT CHANGE UP (ref 135–145)
WBC # BLD: 6.45 K/UL — SIGNIFICANT CHANGE UP (ref 3.8–10.5)
WBC # FLD AUTO: 6.45 K/UL — SIGNIFICANT CHANGE UP (ref 3.8–10.5)

## 2025-05-28 PROCEDURE — 99214 OFFICE O/P EST MOD 30 MIN: CPT

## 2025-05-30 ENCOUNTER — RESULT REVIEW (OUTPATIENT)
Age: 70
End: 2025-05-30

## 2025-05-30 ENCOUNTER — APPOINTMENT (OUTPATIENT)
Dept: MAMMOGRAPHY | Facility: CLINIC | Age: 70
End: 2025-05-30

## 2025-05-30 ENCOUNTER — APPOINTMENT (OUTPATIENT)
Dept: ULTRASOUND IMAGING | Facility: CLINIC | Age: 70
End: 2025-05-30

## 2025-05-30 PROCEDURE — G0279: CPT | Mod: 26

## 2025-05-30 PROCEDURE — 76641 ULTRASOUND BREAST COMPLETE: CPT | Mod: 50,TC,GA

## 2025-05-30 PROCEDURE — 77066 DX MAMMO INCL CAD BI: CPT | Mod: TC

## 2025-06-04 ENCOUNTER — APPOINTMENT (OUTPATIENT)
Dept: MRI IMAGING | Facility: CLINIC | Age: 70
End: 2025-06-04
Payer: MEDICARE

## 2025-06-04 PROCEDURE — A9585: CPT | Mod: JZ

## 2025-06-04 PROCEDURE — 77049 MRI BREAST C-+ W/CAD BI: CPT

## 2025-06-06 ENCOUNTER — NON-APPOINTMENT (OUTPATIENT)
Age: 70
End: 2025-06-06

## 2025-06-06 ENCOUNTER — APPOINTMENT (OUTPATIENT)
Dept: RADIATION ONCOLOGY | Facility: CLINIC | Age: 70
End: 2025-06-06
Payer: MEDICARE

## 2025-06-06 VITALS
OXYGEN SATURATION: 94 % | TEMPERATURE: 98.5 F | DIASTOLIC BLOOD PRESSURE: 74 MMHG | HEIGHT: 63 IN | RESPIRATION RATE: 16 BRPM | WEIGHT: 219 LBS | HEART RATE: 97 BPM | BODY MASS INDEX: 38.8 KG/M2 | SYSTOLIC BLOOD PRESSURE: 113 MMHG

## 2025-06-06 PROCEDURE — G2211 COMPLEX E/M VISIT ADD ON: CPT

## 2025-06-06 PROCEDURE — 99215 OFFICE O/P EST HI 40 MIN: CPT

## 2025-06-13 ENCOUNTER — APPOINTMENT (OUTPATIENT)
Dept: PHYSICAL MEDICINE AND REHAB | Facility: CLINIC | Age: 70
End: 2025-06-13
Payer: MEDICARE

## 2025-06-13 PROCEDURE — 99214 OFFICE O/P EST MOD 30 MIN: CPT

## 2025-06-26 ENCOUNTER — NON-APPOINTMENT (OUTPATIENT)
Age: 70
End: 2025-06-26

## 2025-06-26 VITALS
DIASTOLIC BLOOD PRESSURE: 73 MMHG | OXYGEN SATURATION: 93 % | BODY MASS INDEX: 38.09 KG/M2 | HEIGHT: 63 IN | RESPIRATION RATE: 16 BRPM | SYSTOLIC BLOOD PRESSURE: 114 MMHG | HEART RATE: 91 BPM | WEIGHT: 215 LBS

## 2025-07-03 ENCOUNTER — NON-APPOINTMENT (OUTPATIENT)
Age: 70
End: 2025-07-03

## 2025-07-03 VITALS
DIASTOLIC BLOOD PRESSURE: 78 MMHG | BODY MASS INDEX: 38.09 KG/M2 | HEIGHT: 63 IN | HEART RATE: 87 BPM | SYSTOLIC BLOOD PRESSURE: 112 MMHG | OXYGEN SATURATION: 90 % | WEIGHT: 215 LBS | RESPIRATION RATE: 16 BRPM

## 2025-07-10 ENCOUNTER — NON-APPOINTMENT (OUTPATIENT)
Age: 70
End: 2025-07-10

## 2025-07-10 VITALS
DIASTOLIC BLOOD PRESSURE: 84 MMHG | HEART RATE: 92 BPM | WEIGHT: 214 LBS | SYSTOLIC BLOOD PRESSURE: 132 MMHG | RESPIRATION RATE: 16 BRPM | OXYGEN SATURATION: 92 % | BODY MASS INDEX: 37.91 KG/M2

## 2025-07-25 ENCOUNTER — APPOINTMENT (OUTPATIENT)
Dept: BREAST CENTER | Facility: CLINIC | Age: 70
End: 2025-07-25
Payer: MEDICARE

## 2025-07-25 VITALS
HEIGHT: 63 IN | WEIGHT: 214 LBS | BODY MASS INDEX: 37.92 KG/M2 | SYSTOLIC BLOOD PRESSURE: 93 MMHG | DIASTOLIC BLOOD PRESSURE: 67 MMHG | HEART RATE: 94 BPM

## 2025-07-25 PROCEDURE — 99213 OFFICE O/P EST LOW 20 MIN: CPT

## 2025-07-25 NOTE — ASU DISCHARGE PLAN (ADULT/PEDIATRIC) - CARE COORDINATION DISCHARGE PLANNING
Called Sandra and spoke to her daughter ([phone]) - reviewed medication instructions. She verbalized understanding. She said they are leaving for vacation on 7/31 and won't be back until 8/9. She is wondering if the script for Rexulti can be a 17 day supply so they have enough to last through vacation and they don't have to go right to the pharmacy.    No

## 2025-07-28 DIAGNOSIS — R11.2 NAUSEA WITH VOMITING, UNSPECIFIED: ICD-10-CM

## 2025-07-28 DIAGNOSIS — T45.1X5A NAUSEA WITH VOMITING, UNSPECIFIED: ICD-10-CM

## 2025-07-29 ENCOUNTER — RESULT REVIEW (OUTPATIENT)
Age: 70
End: 2025-07-29

## 2025-07-29 ENCOUNTER — APPOINTMENT (OUTPATIENT)
Dept: INFUSION THERAPY | Facility: CLINIC | Age: 70
End: 2025-07-29
Payer: MEDICARE

## 2025-07-29 ENCOUNTER — APPOINTMENT (OUTPATIENT)
Dept: HEMATOLOGY ONCOLOGY | Facility: CLINIC | Age: 70
End: 2025-07-29
Payer: MEDICARE

## 2025-07-29 VITALS
BODY MASS INDEX: 37.73 KG/M2 | HEART RATE: 87 BPM | DIASTOLIC BLOOD PRESSURE: 67 MMHG | WEIGHT: 213 LBS | TEMPERATURE: 98 F | SYSTOLIC BLOOD PRESSURE: 111 MMHG | OXYGEN SATURATION: 92 %

## 2025-07-29 DIAGNOSIS — Z51.81 ENCOUNTER FOR THERAPEUTIC DRUG LVL MONITORING: ICD-10-CM

## 2025-07-29 DIAGNOSIS — Z79.899 ENCOUNTER FOR THERAPEUTIC DRUG LVL MONITORING: ICD-10-CM

## 2025-07-29 DIAGNOSIS — C77.3 MALIGNANT NEOPLASM OF UNSPECIFIED SITE OF LEFT FEMALE BREAST: ICD-10-CM

## 2025-07-29 DIAGNOSIS — C50.912 MALIGNANT NEOPLASM OF UNSPECIFIED SITE OF LEFT FEMALE BREAST: ICD-10-CM

## 2025-07-29 DIAGNOSIS — Z79.69 LONG TERM (CURRENT) USE OF OTHER IMMUNOMODULATORS AND IMMUNOSUPPRESSANTS: ICD-10-CM

## 2025-07-29 PROCEDURE — 99215 OFFICE O/P EST HI 40 MIN: CPT

## 2025-07-29 RX ORDER — ANASTROZOLE TABLETS 1 MG/1
1 TABLET ORAL DAILY
Qty: 90 | Refills: 3 | Status: ACTIVE | COMMUNITY
Start: 2025-07-29 | End: 1900-01-01

## 2025-07-30 LAB
25(OH)D3 SERPL-MCNC: 31.6 NG/ML
ALBUMIN SERPL ELPH-MCNC: 3.9 G/DL
ALP BLD-CCNC: 96 U/L
ALT SERPL-CCNC: 24 U/L
ANION GAP SERPL CALC-SCNC: 14 MMOL/L
AST SERPL-CCNC: 23 U/L
BILIRUB SERPL-MCNC: 0.2 MG/DL
BUN SERPL-MCNC: 14 MG/DL
CALCIUM SERPL-MCNC: 9.1 MG/DL
CHLORIDE SERPL-SCNC: 100 MMOL/L
CO2 SERPL-SCNC: 25 MMOL/L
CREAT SERPL-MCNC: 0.45 MG/DL
EGFRCR SERPLBLD CKD-EPI 2021: 103 ML/MIN/1.73M2
GLUCOSE SERPL-MCNC: 135 MG/DL
POTASSIUM SERPL-SCNC: 4.2 MMOL/L
PROT SERPL-MCNC: 6 G/DL
SODIUM SERPL-SCNC: 139 MMOL/L

## 2025-08-01 ENCOUNTER — APPOINTMENT (OUTPATIENT)
Dept: PHYSICAL MEDICINE AND REHAB | Facility: CLINIC | Age: 70
End: 2025-08-01
Payer: MEDICARE

## 2025-08-01 DIAGNOSIS — I89.0 LYMPHEDEMA, NOT ELSEWHERE CLASSIFIED: ICD-10-CM

## 2025-08-01 PROCEDURE — 99213 OFFICE O/P EST LOW 20 MIN: CPT

## 2025-08-04 PROBLEM — Z51.81 ENCOUNTER FOR MONITORING ADJUVANT HORMONAL THERAPY: Status: ACTIVE | Noted: 2025-08-04

## 2025-08-04 PROBLEM — Z79.69 ON ANTINEOPLASTIC CHEMOTHERAPY: Status: RESOLVED | Noted: 2024-12-26 | Resolved: 2025-08-04

## 2025-08-04 RX ORDER — RIBOCICLIB 200 MG/1
200 TABLET, FILM COATED ORAL
Qty: 42 | Refills: 5 | Status: ACTIVE | COMMUNITY
Start: 2025-08-04 | End: 1900-01-01

## 2025-08-21 ENCOUNTER — NON-APPOINTMENT (OUTPATIENT)
Age: 70
End: 2025-08-21

## 2025-08-26 ENCOUNTER — TRANSCRIPTION ENCOUNTER (OUTPATIENT)
Age: 70
End: 2025-08-26

## 2025-08-26 ENCOUNTER — OUTPATIENT (OUTPATIENT)
Dept: INPATIENT UNIT | Facility: HOSPITAL | Age: 70
LOS: 1 days | Discharge: HOME | End: 2025-08-26
Payer: MEDICARE

## 2025-08-26 ENCOUNTER — RESULT REVIEW (OUTPATIENT)
Age: 70
End: 2025-08-26

## 2025-08-26 VITALS
HEART RATE: 86 BPM | WEIGHT: 214.07 LBS | SYSTOLIC BLOOD PRESSURE: 129 MMHG | RESPIRATION RATE: 18 BRPM | HEIGHT: 64 IN | DIASTOLIC BLOOD PRESSURE: 87 MMHG | TEMPERATURE: 97 F | OXYGEN SATURATION: 95 %

## 2025-08-26 VITALS
DIASTOLIC BLOOD PRESSURE: 87 MMHG | RESPIRATION RATE: 16 BRPM | SYSTOLIC BLOOD PRESSURE: 142 MMHG | OXYGEN SATURATION: 94 % | TEMPERATURE: 97 F | HEART RATE: 98 BPM

## 2025-08-26 DIAGNOSIS — C50.912 MALIGNANT NEOPLASM OF UNSPECIFIED SITE OF LEFT FEMALE BREAST: ICD-10-CM

## 2025-08-26 DIAGNOSIS — Z98.890 OTHER SPECIFIED POSTPROCEDURAL STATES: Chronic | ICD-10-CM

## 2025-08-26 DIAGNOSIS — Z95.2 PRESENCE OF PROSTHETIC HEART VALVE: Chronic | ICD-10-CM

## 2025-08-26 DIAGNOSIS — C50.919 MALIGNANT NEOPLASM OF UNSPECIFIED SITE OF UNSPECIFIED FEMALE BREAST: ICD-10-CM

## 2025-08-26 LAB
INR BLD: 1.03 RATIO — SIGNIFICANT CHANGE UP (ref 0.85–1.16)
PROTHROM AB SERPL-ACNC: 11.9 SEC — SIGNIFICANT CHANGE UP (ref 9.9–13.4)

## 2025-08-26 PROCEDURE — 93010 ELECTROCARDIOGRAM REPORT: CPT

## 2025-08-26 PROCEDURE — 36415 COLL VENOUS BLD VENIPUNCTURE: CPT

## 2025-08-26 PROCEDURE — 77001 FLUOROGUIDE FOR VEIN DEVICE: CPT | Mod: 26

## 2025-08-26 PROCEDURE — 36590 REMOVAL TUNNELED CV CATH: CPT

## 2025-08-26 PROCEDURE — 85610 PROTHROMBIN TIME: CPT

## 2025-08-26 PROCEDURE — 77001 FLUOROGUIDE FOR VEIN DEVICE: CPT

## 2025-08-26 PROCEDURE — 93005 ELECTROCARDIOGRAM TRACING: CPT

## 2025-08-26 RX ORDER — SEMAGLUTIDE 1 MG/.5ML
1 INJECTION, SOLUTION SUBCUTANEOUS
Refills: 0 | DISCHARGE

## 2025-08-26 RX ORDER — ACETAMINOPHEN 500 MG/5ML
650 LIQUID (ML) ORAL EVERY 6 HOURS
Refills: 0 | Status: DISCONTINUED | OUTPATIENT
Start: 2025-08-26 | End: 2025-08-26

## 2025-08-26 RX ORDER — ONDANSETRON HCL/PF 4 MG/2 ML
4 VIAL (ML) INJECTION ONCE
Refills: 0 | Status: DISCONTINUED | OUTPATIENT
Start: 2025-08-26 | End: 2025-08-26

## 2025-09-02 ENCOUNTER — RESULT REVIEW (OUTPATIENT)
Age: 70
End: 2025-09-02

## 2025-09-02 ENCOUNTER — APPOINTMENT (OUTPATIENT)
Dept: HEMATOLOGY ONCOLOGY | Facility: CLINIC | Age: 70
End: 2025-09-02
Payer: MEDICARE

## 2025-09-02 VITALS
DIASTOLIC BLOOD PRESSURE: 60 MMHG | SYSTOLIC BLOOD PRESSURE: 90 MMHG | HEART RATE: 89 BPM | TEMPERATURE: 97.2 F | WEIGHT: 218 LBS | OXYGEN SATURATION: 94 % | BODY MASS INDEX: 38.62 KG/M2

## 2025-09-02 DIAGNOSIS — Z79.69 LONG TERM (CURRENT) USE OF OTHER IMMUNOMODULATORS AND IMMUNOSUPPRESSANTS: ICD-10-CM

## 2025-09-02 DIAGNOSIS — Z92.3 PERSONAL HISTORY OF IRRADIATION: ICD-10-CM

## 2025-09-02 DIAGNOSIS — Z51.81 ENCOUNTER FOR THERAPEUTIC DRUG LVL MONITORING: ICD-10-CM

## 2025-09-02 DIAGNOSIS — Z87.898 PERSONAL HISTORY OF OTHER SPECIFIED CONDITIONS: ICD-10-CM

## 2025-09-02 DIAGNOSIS — C50.912 MALIGNANT NEOPLASM OF UNSPECIFIED SITE OF LEFT FEMALE BREAST: ICD-10-CM

## 2025-09-02 DIAGNOSIS — Z79.899 ENCOUNTER FOR THERAPEUTIC DRUG LVL MONITORING: ICD-10-CM

## 2025-09-02 DIAGNOSIS — C77.3 MALIGNANT NEOPLASM OF UNSPECIFIED SITE OF LEFT FEMALE BREAST: ICD-10-CM

## 2025-09-02 PROCEDURE — 99214 OFFICE O/P EST MOD 30 MIN: CPT

## 2025-09-03 LAB
ALBUMIN SERPL ELPH-MCNC: 3.9 G/DL
ALP BLD-CCNC: 96 U/L
ALT SERPL-CCNC: 20 U/L
ANION GAP SERPL CALC-SCNC: 12 MMOL/L
AST SERPL-CCNC: 22 U/L
BILIRUB SERPL-MCNC: 0.2 MG/DL
BUN SERPL-MCNC: 15 MG/DL
CALCIUM SERPL-MCNC: 9.1 MG/DL
CHLORIDE SERPL-SCNC: 101 MMOL/L
CO2 SERPL-SCNC: 24 MMOL/L
CREAT SERPL-MCNC: 0.51 MG/DL
EGFRCR SERPLBLD CKD-EPI 2021: 100 ML/MIN/1.73M2
GLUCOSE SERPL-MCNC: 133 MG/DL
POTASSIUM SERPL-SCNC: 3.9 MMOL/L
PROT SERPL-MCNC: 6.2 G/DL
SODIUM SERPL-SCNC: 137 MMOL/L

## 2025-09-12 ENCOUNTER — APPOINTMENT (OUTPATIENT)
Dept: RADIATION ONCOLOGY | Facility: CLINIC | Age: 70
End: 2025-09-12
Payer: MEDICARE

## 2025-09-12 VITALS
WEIGHT: 218 LBS | HEART RATE: 96 BPM | RESPIRATION RATE: 16 BRPM | HEIGHT: 63 IN | SYSTOLIC BLOOD PRESSURE: 108 MMHG | DIASTOLIC BLOOD PRESSURE: 73 MMHG | BODY MASS INDEX: 38.62 KG/M2 | OXYGEN SATURATION: 94 %

## 2025-09-12 DIAGNOSIS — C77.3 MALIGNANT NEOPLASM OF UNSPECIFIED SITE OF LEFT FEMALE BREAST: ICD-10-CM

## 2025-09-12 DIAGNOSIS — C50.912 MALIGNANT NEOPLASM OF UNSPECIFIED SITE OF LEFT FEMALE BREAST: ICD-10-CM

## 2025-09-12 PROCEDURE — 99214 OFFICE O/P EST MOD 30 MIN: CPT

## 2025-09-17 ENCOUNTER — RESULT REVIEW (OUTPATIENT)
Age: 70
End: 2025-09-17

## 2025-09-17 ENCOUNTER — APPOINTMENT (OUTPATIENT)
Dept: HEMATOLOGY ONCOLOGY | Facility: CLINIC | Age: 70
End: 2025-09-17

## 2025-09-18 LAB
ALBUMIN SERPL ELPH-MCNC: 3.9 G/DL
ALP BLD-CCNC: 101 U/L
ALT SERPL-CCNC: 18 U/L
ANION GAP SERPL CALC-SCNC: 12 MMOL/L
AST SERPL-CCNC: 17 U/L
BILIRUB SERPL-MCNC: 0.2 MG/DL
BUN SERPL-MCNC: 14 MG/DL
CALCIUM SERPL-MCNC: 8.8 MG/DL
CHLORIDE SERPL-SCNC: 101 MMOL/L
CO2 SERPL-SCNC: 27 MMOL/L
CREAT SERPL-MCNC: 0.56 MG/DL
EGFRCR SERPLBLD CKD-EPI 2021: 98 ML/MIN/1.73M2
GLUCOSE SERPL-MCNC: 125 MG/DL
POTASSIUM SERPL-SCNC: 3.8 MMOL/L
PROT SERPL-MCNC: 6.2 G/DL
SODIUM SERPL-SCNC: 141 MMOL/L

## (undated) DEVICE — SOL IRR POUR H2O 500ML

## (undated) DEVICE — CANISTER SUCTION 1200CC 10/SL

## (undated) DEVICE — TUBING SUCTION CONN 6FT STERILE

## (undated) DEVICE — SYR LUER SLIP TIP 30CC

## (undated) DEVICE — TUBING CANNULA SALTER LABS NASAL ADULT 7FT

## (undated) DEVICE — SNARE CAPTIVATOR II RND COLD 10MM

## (undated) DEVICE — ELCTR GROUNDING PAD ADULT COVIDIEN

## (undated) DEVICE — Device

## (undated) DEVICE — ENDOCUFF VISION SZ 2 LG GRN

## (undated) DEVICE — FORCEP RADIAL JAW 4 JUMBO 2.8MM 3.2MM 240CM ORANGE DISP

## (undated) DEVICE — TUBE RECTAL 24FR

## (undated) DEVICE — TUBING IV SET SECONDARY 34"

## (undated) DEVICE — FORCEP RADIAL JAW 4 W NDL 2.4MM 2.8MM 240CM ORANGE DISP

## (undated) DEVICE — CATH IV SAFE BC 22G X 1" (BLUE)

## (undated) DEVICE — ENDOCUFF VISION SZ 3 SM PRPL

## (undated) DEVICE — STERIS DEFENDO 3-PIECE KIT (AIR/WATER, SUCTION & BIOPSY VALVES)

## (undated) DEVICE — SNARE POLYP SENS 27MM 240CM

## (undated) DEVICE — SYR IV POSIFLUSH NS 3ML 30/TY

## (undated) DEVICE — NDL INJ SCLERO INTERJECT 23G

## (undated) DEVICE — MASK LRG MED AND HIGH O2 CONC M TO M 10FT

## (undated) DEVICE — SUCTION YANKAUER TAPERED BULBOUS NO VENT

## (undated) DEVICE — TRAP QUICK CATCH  SINGL CHAMBER

## (undated) DEVICE — TUBING IV SET GRAVITY 3Y 100" MACRO

## (undated) DEVICE — TUBE O2 SUPL CRUSH RESIS CONN SOUTHSIDE ONLY

## (undated) DEVICE — BRUSH COLONOSCOPY CYTOLOGY

## (undated) DEVICE — POLY TRAP ETRAP

## (undated) DEVICE — SENSOR O2 FINGER XL ADULT 24/BX 6BX/CA

## (undated) DEVICE — VALVE BIOPSY

## (undated) DEVICE — SYR LUER SLIP TIP 50CC

## (undated) DEVICE — PACK IV START WITH CHG

## (undated) DEVICE — MASK O2 NON REBREATH 3IN1 ADULT

## (undated) DEVICE — FORMALIN CUPS 10% BUFFERED

## (undated) DEVICE — MARKER ENDO SPOT EX

## (undated) DEVICE — RETRIEVER ROTH NET PLATINUM-UNIVERSAL

## (undated) DEVICE — SNARE LRG

## (undated) DEVICE — SUT HEWSON RETRIEVER

## (undated) DEVICE — CATH IV SAFE BC 20G X 1.16" (PINK)